# Patient Record
Sex: FEMALE | Race: WHITE | NOT HISPANIC OR LATINO
[De-identification: names, ages, dates, MRNs, and addresses within clinical notes are randomized per-mention and may not be internally consistent; named-entity substitution may affect disease eponyms.]

---

## 2018-11-27 ENCOUNTER — APPOINTMENT (OUTPATIENT)
Dept: SURGERY | Facility: CLINIC | Age: 48
End: 2018-11-27
Payer: MEDICAID

## 2018-11-27 VITALS
OXYGEN SATURATION: 97 % | HEIGHT: 63 IN | WEIGHT: 119 LBS | BODY MASS INDEX: 21.09 KG/M2 | SYSTOLIC BLOOD PRESSURE: 118 MMHG | HEART RATE: 81 BPM | TEMPERATURE: 97.9 F | DIASTOLIC BLOOD PRESSURE: 71 MMHG

## 2018-11-27 DIAGNOSIS — N63.10 UNSPECIFIED LUMP IN THE RIGHT BREAST, UNSPECIFIED QUADRANT: ICD-10-CM

## 2018-11-27 DIAGNOSIS — Z78.9 OTHER SPECIFIED HEALTH STATUS: ICD-10-CM

## 2018-11-27 DIAGNOSIS — Z80.3 FAMILY HISTORY OF MALIGNANT NEOPLASM OF BREAST: ICD-10-CM

## 2018-11-27 PROCEDURE — 99205 OFFICE O/P NEW HI 60 MIN: CPT

## 2018-12-07 PROBLEM — Z80.3 FAMILY HISTORY OF MALIGNANT NEOPLASM OF BREAST: Status: ACTIVE | Noted: 2018-11-27

## 2018-12-07 PROBLEM — N63.10 MASS OF BREAST, RIGHT: Status: ACTIVE | Noted: 2018-11-27

## 2018-12-07 PROBLEM — Z78.9 CAFFEINE USE: Status: ACTIVE | Noted: 2018-11-27

## 2018-12-11 ENCOUNTER — OUTPATIENT (OUTPATIENT)
Dept: OUTPATIENT SERVICES | Facility: HOSPITAL | Age: 48
LOS: 1 days | Discharge: HOME | End: 2018-12-11
Payer: COMMERCIAL

## 2018-12-11 VITALS
HEART RATE: 85 BPM | OXYGEN SATURATION: 100 % | WEIGHT: 121.92 LBS | TEMPERATURE: 98 F | HEIGHT: 62 IN | DIASTOLIC BLOOD PRESSURE: 60 MMHG | RESPIRATION RATE: 18 BRPM | SYSTOLIC BLOOD PRESSURE: 123 MMHG

## 2018-12-11 DIAGNOSIS — Z90.49 ACQUIRED ABSENCE OF OTHER SPECIFIED PARTS OF DIGESTIVE TRACT: Chronic | ICD-10-CM

## 2018-12-11 DIAGNOSIS — Z98.890 OTHER SPECIFIED POSTPROCEDURAL STATES: Chronic | ICD-10-CM

## 2018-12-11 DIAGNOSIS — C50.911 MALIGNANT NEOPLASM OF UNSPECIFIED SITE OF RIGHT FEMALE BREAST: ICD-10-CM

## 2018-12-11 DIAGNOSIS — C50.919 MALIGNANT NEOPLASM OF UNSPECIFIED SITE OF UNSPECIFIED FEMALE BREAST: ICD-10-CM

## 2018-12-11 DIAGNOSIS — N63.0 UNSPECIFIED LUMP IN UNSPECIFIED BREAST: ICD-10-CM

## 2018-12-11 DIAGNOSIS — Z98.82 BREAST IMPLANT STATUS: Chronic | ICD-10-CM

## 2018-12-11 DIAGNOSIS — Z29.9 ENCOUNTER FOR PROPHYLACTIC MEASURES, UNSPECIFIED: ICD-10-CM

## 2018-12-11 LAB
ANION GAP SERPL CALC-SCNC: 6 MMOL/L — SIGNIFICANT CHANGE UP (ref 5–17)
APPEARANCE UR: CLEAR — SIGNIFICANT CHANGE UP
APTT BLD: 26.6 SEC — LOW (ref 27.5–36.3)
BASOPHILS # BLD AUTO: 0.04 K/UL — SIGNIFICANT CHANGE UP (ref 0–0.2)
BASOPHILS NFR BLD AUTO: 0.9 % — SIGNIFICANT CHANGE UP (ref 0–2)
BILIRUB UR-MCNC: NEGATIVE — SIGNIFICANT CHANGE UP
BUN SERPL-MCNC: 11 MG/DL — SIGNIFICANT CHANGE UP (ref 7–23)
CALCIUM SERPL-MCNC: 8.6 MG/DL — SIGNIFICANT CHANGE UP (ref 8.5–10.1)
CHLORIDE SERPL-SCNC: 107 MMOL/L — SIGNIFICANT CHANGE UP (ref 96–108)
CO2 SERPL-SCNC: 27 MMOL/L — SIGNIFICANT CHANGE UP (ref 22–31)
COLOR SPEC: YELLOW — SIGNIFICANT CHANGE UP
CREAT SERPL-MCNC: 0.67 MG/DL — SIGNIFICANT CHANGE UP (ref 0.5–1.3)
DIFF PNL FLD: NEGATIVE — SIGNIFICANT CHANGE UP
EOSINOPHIL # BLD AUTO: 0.1 K/UL — SIGNIFICANT CHANGE UP (ref 0–0.5)
EOSINOPHIL NFR BLD AUTO: 2.3 % — SIGNIFICANT CHANGE UP (ref 0–6)
GLUCOSE SERPL-MCNC: 94 MG/DL — SIGNIFICANT CHANGE UP (ref 70–99)
GLUCOSE UR QL: NEGATIVE MG/DL — SIGNIFICANT CHANGE UP
HCT VFR BLD CALC: 41.5 % — SIGNIFICANT CHANGE UP (ref 34.5–45)
HGB BLD-MCNC: 13.9 G/DL — SIGNIFICANT CHANGE UP (ref 11.5–15.5)
IMM GRANULOCYTES NFR BLD AUTO: 0.5 % — SIGNIFICANT CHANGE UP (ref 0–1.5)
INR BLD: 0.99 RATIO — SIGNIFICANT CHANGE UP (ref 0.88–1.16)
KETONES UR-MCNC: NEGATIVE — SIGNIFICANT CHANGE UP
LEUKOCYTE ESTERASE UR-ACNC: NEGATIVE — SIGNIFICANT CHANGE UP
LYMPHOCYTES # BLD AUTO: 1.19 K/UL — SIGNIFICANT CHANGE UP (ref 1–3.3)
LYMPHOCYTES # BLD AUTO: 27.8 % — SIGNIFICANT CHANGE UP (ref 13–44)
MCHC RBC-ENTMCNC: 31.9 PG — SIGNIFICANT CHANGE UP (ref 27–34)
MCHC RBC-ENTMCNC: 33.5 GM/DL — SIGNIFICANT CHANGE UP (ref 32–36)
MCV RBC AUTO: 95.2 FL — SIGNIFICANT CHANGE UP (ref 80–100)
MONOCYTES # BLD AUTO: 0.42 K/UL — SIGNIFICANT CHANGE UP (ref 0–0.9)
MONOCYTES NFR BLD AUTO: 9.8 % — SIGNIFICANT CHANGE UP (ref 2–14)
MRSA PCR RESULT.: SIGNIFICANT CHANGE UP
NEUTROPHILS # BLD AUTO: 2.51 K/UL — SIGNIFICANT CHANGE UP (ref 1.8–7.4)
NEUTROPHILS NFR BLD AUTO: 58.7 % — SIGNIFICANT CHANGE UP (ref 43–77)
NITRITE UR-MCNC: NEGATIVE — SIGNIFICANT CHANGE UP
NRBC # BLD: 0 /100 WBCS — SIGNIFICANT CHANGE UP (ref 0–0)
PH UR: 7 — SIGNIFICANT CHANGE UP (ref 5–8)
PLATELET # BLD AUTO: 247 K/UL — SIGNIFICANT CHANGE UP (ref 150–400)
POTASSIUM SERPL-MCNC: 4.2 MMOL/L — SIGNIFICANT CHANGE UP (ref 3.5–5.3)
POTASSIUM SERPL-SCNC: 4.2 MMOL/L — SIGNIFICANT CHANGE UP (ref 3.5–5.3)
PROT UR-MCNC: NEGATIVE MG/DL — SIGNIFICANT CHANGE UP
PROTHROM AB SERPL-ACNC: 11 SEC — SIGNIFICANT CHANGE UP (ref 10–12.9)
RBC # BLD: 4.36 M/UL — SIGNIFICANT CHANGE UP (ref 3.8–5.2)
RBC # FLD: 12.7 % — SIGNIFICANT CHANGE UP (ref 10.3–14.5)
S AUREUS DNA NOSE QL NAA+PROBE: DETECTED
SODIUM SERPL-SCNC: 140 MMOL/L — SIGNIFICANT CHANGE UP (ref 135–145)
SP GR SPEC: 1.01 — SIGNIFICANT CHANGE UP (ref 1.01–1.02)
UROBILINOGEN FLD QL: NEGATIVE MG/DL — SIGNIFICANT CHANGE UP
WBC # BLD: 4.28 K/UL — SIGNIFICANT CHANGE UP (ref 3.8–10.5)
WBC # FLD AUTO: 4.28 K/UL — SIGNIFICANT CHANGE UP (ref 3.8–10.5)

## 2018-12-11 PROCEDURE — 93010 ELECTROCARDIOGRAM REPORT: CPT

## 2018-12-11 NOTE — H&P PST ADULT - ASSESSMENT
48 years old female present to PST prior to :  Bilateral nipple sparing mastectomy with right sentinel lymph node biopsy, possible axillary dissection with Dr. Vida Vidales.    Direct to implant bilateral breast reconstruction with possible tissue expanders and alloderm with Dr. Lagunas.    Plan   1. NPO after midnight  2. Urine pregnancy on the day of surgery  3. Use E-Z sponge as directed  4. Use Mupirocin as directed.  5. Drink a quart of extra  fluids the day before your surgery.  6. CBC, BMP, PT/PTT/INR, Urinalysis, MRSA sent to lab  7. EKG  done    CAPRINI SCORE [CLOT]    AGE RELATED RISK FACTORS                                                       MOBILITY RELATED FACTORS  [x ] Age 41-60 years                                            (1 Point)                  [ ] Bed rest                                                        (1 Point)  [ ] Age: 61-74 years                                           (2 Points)                 [ ] Plaster cast                                                   (2 Points)  [ ] Age= 75 years                                              (3 Points)                 [ ] Bed bound for more than 72 hours                 (2 Points)    DISEASE RELATED RISK FACTORS                                               GENDER SPECIFIC FACTORS  [ ] Edema in the lower extremities                       (1 Point)                  [ ] Pregnancy                                                     (1 Point)  [ ] Varicose veins                                               (1 Point)                  [ ] Post-partum < 6 weeks                                   (1 Point)             [ ] BMI > 25 Kg/m2                                            (1 Point)                  [ ] Hormonal therapy  or oral contraception          (1 Point)                 [ ] Sepsis (in the previous month)                        (1 Point)                  [ ] History of pregnancy complications                 (1 point)  [ ] Pneumonia or serious lung disease                                               [ ] Unexplained or recurrent                     (1 Point)           (in the previous month)                               (1 Point)  [ ] Abnormal pulmonary function test                     (1 Point)                 SURGERY RELATED RISK FACTORS  [ ] Acute myocardial infarction                              (1 Point)                 [ ]  Section                                             (1 Point)  [ ] Congestive heart failure (in the previous month)  (1 Point)               [ ] Minor surgery                                                  (1 Point)   [ ] Inflammatory bowel disease                             (1 Point)                 [ ] Arthroscopic surgery                                        (2 Points)  [ ] Central venous access                                      (2 Points)                [x ] General surgery lasting more than 45 minutes   (2 Points)       [ ] Stroke (in the previous month)                          (5 Points)               [ ] Elective arthroplasty                                         (5 Points)            [ x] malignancy present or previous                      (2 points)                                                                                                                                   HEMATOLOGY RELATED FACTORS                                                 TRAUMA RELATED RISK FACTORS  [ ] Prior episodes of VTE                                     (3 Points)                 [ ] Fracture of the hip, pelvis, or leg                       (5 Points)  [ ] Positive family history for VTE                         (3 Points)                 [ ] Acute spinal cord injury (in the previous month)  (5 Points)  [ ] Prothrombin 45795 A                                     (3 Points)                 [ ] Paralysis  (less than 1 month)                             (5 Points)  [ ] Factor V Leiden                                             (3 Points)                  [ ] Multiple Trauma within 1 month                        (5 Points)  [ ] Lupus anticoagulants                                     (3 Points)                                                           [ ] Anticardiolipin antibodies                               (3 Points)                                                       [ ] High homocysteine in the blood                      (3 Points)                                             [ ] Other congenital or acquired thrombophilia      (3 Points)                                                [ ] Heparin induced thrombocytopenia                  (3 Points)                                          Total Score [    5      ]

## 2018-12-11 NOTE — H&P PST ADULT - NEGATIVE SKIN SYMPTOMS
no itching/no brittle nails/no dryness/no change in size/color of mole/no tumor/no pitted nails/no hair loss/no rash

## 2018-12-11 NOTE — H&P PST ADULT - PMH
BRCA positive    Breast cancer in female  Right  Gastric polyp  History of  History of gallbladder disease  removed  Hyperlipidemia, unspecified hyperlipidemia type

## 2018-12-11 NOTE — H&P PST ADULT - TEACHING/LEARNING LEARNING PREFERENCES
verbal instruction/pictorial/group instruction/individual instruction/audio/skill demonstration/computer/internet/video/written material

## 2018-12-11 NOTE — H&P PST ADULT - PSH
H/O breast augmentation  2004  History of cholecystectomy  1989  History of other surgery  Liposuction

## 2018-12-11 NOTE — H&P PST ADULT - FAMILY HISTORY
Grandparent  Still living? No  Family history of breast cancer, Age at diagnosis: Age Unknown     Aunt  Still living? Yes, Estimated age: Age Unknown  Family history of breast cancer, Age at diagnosis: Age Unknown     Grandparent  Still living? No  Family history of throat cancer, Age at diagnosis: Age Unknown     Father  Still living? No  Family history of acute lymphoid leukemia, Age at diagnosis: Age Unknown

## 2018-12-11 NOTE — H&P PST ADULT - HISTORY OF PRESENT ILLNESS
48 years old female with Right breast cancer. She is also BRCA positive. She  tested positive in September, 2018. Cousins x 2 and sister also tested positive after reoccurring breast cancer in aunt. She then had several tests including Mammogram, sonogram, MRI with contrast. She also had breast biopsy with marker. Planned mastectomy with reconstruction.

## 2018-12-11 NOTE — H&P PST ADULT - ATTENDING COMMENTS
Patient understands technical aspects of procedure, risks particular and specific to nipple sparing mastectomy, the need for sentinel lymph node biopsy on the cancer side (right side) with possible axillary dissection depending on intra operative pathology analysis (frozen section).  She also understands risks v benefits and alternative to surgery.

## 2018-12-12 ENCOUNTER — OUTPATIENT (OUTPATIENT)
Dept: OUTPATIENT SERVICES | Facility: HOSPITAL | Age: 48
LOS: 1 days | Discharge: HOME | End: 2018-12-12
Payer: COMMERCIAL

## 2018-12-12 ENCOUNTER — RESULT REVIEW (OUTPATIENT)
Age: 48
End: 2018-12-12

## 2018-12-12 DIAGNOSIS — Z98.82 BREAST IMPLANT STATUS: Chronic | ICD-10-CM

## 2018-12-12 DIAGNOSIS — Z98.890 OTHER SPECIFIED POSTPROCEDURAL STATES: Chronic | ICD-10-CM

## 2018-12-12 DIAGNOSIS — C50.919 MALIGNANT NEOPLASM OF UNSPECIFIED SITE OF UNSPECIFIED FEMALE BREAST: ICD-10-CM

## 2018-12-12 DIAGNOSIS — Z90.49 ACQUIRED ABSENCE OF OTHER SPECIFIED PARTS OF DIGESTIVE TRACT: Chronic | ICD-10-CM

## 2018-12-12 LAB — SURGICAL PATHOLOGY FINAL REPORT - CH: SIGNIFICANT CHANGE UP

## 2018-12-12 PROCEDURE — 88321 CONSLTJ&REPRT SLD PREP ELSWR: CPT

## 2018-12-14 PROBLEM — Z15.01 GENETIC SUSCEPTIBILITY TO MALIGNANT NEOPLASM OF BREAST: Chronic | Status: ACTIVE | Noted: 2018-12-11

## 2018-12-14 PROBLEM — K31.7 POLYP OF STOMACH AND DUODENUM: Chronic | Status: ACTIVE | Noted: 2018-12-11

## 2018-12-19 ENCOUNTER — RESULT REVIEW (OUTPATIENT)
Age: 48
End: 2018-12-19

## 2018-12-19 ENCOUNTER — INPATIENT (INPATIENT)
Facility: HOSPITAL | Age: 48
LOS: 0 days | Discharge: HOME | End: 2018-12-20
Attending: SURGERY | Admitting: SURGERY
Payer: MEDICAID

## 2018-12-19 ENCOUNTER — APPOINTMENT (OUTPATIENT)
Dept: SURGERY | Facility: HOSPITAL | Age: 48
End: 2018-12-19

## 2018-12-19 VITALS
TEMPERATURE: 98 F | OXYGEN SATURATION: 100 % | DIASTOLIC BLOOD PRESSURE: 90 MMHG | HEART RATE: 100 BPM | WEIGHT: 122.36 LBS | SYSTOLIC BLOOD PRESSURE: 121 MMHG | HEIGHT: 63 IN | RESPIRATION RATE: 16 BRPM

## 2018-12-19 DIAGNOSIS — Z90.49 ACQUIRED ABSENCE OF OTHER SPECIFIED PARTS OF DIGESTIVE TRACT: Chronic | ICD-10-CM

## 2018-12-19 DIAGNOSIS — Z98.890 OTHER SPECIFIED POSTPROCEDURAL STATES: Chronic | ICD-10-CM

## 2018-12-19 DIAGNOSIS — Z98.82 BREAST IMPLANT STATUS: Chronic | ICD-10-CM

## 2018-12-19 LAB — HCG UR QL: NEGATIVE — SIGNIFICANT CHANGE UP

## 2018-12-19 PROCEDURE — 88300 SURGICAL PATH GROSS: CPT | Mod: 26,59

## 2018-12-19 PROCEDURE — 38792 RA TRACER ID OF SENTINL NODE: CPT | Mod: RT

## 2018-12-19 PROCEDURE — 88307 TISSUE EXAM BY PATHOLOGIST: CPT | Mod: 26

## 2018-12-19 PROCEDURE — 88305 TISSUE EXAM BY PATHOLOGIST: CPT | Mod: 26

## 2018-12-19 PROCEDURE — 19357 TISS XPNDR PLMT BRST RCNSTJ: CPT | Mod: AS,50

## 2018-12-19 PROCEDURE — 88333 PATH CONSLTJ SURG CYTO XM 1: CPT | Mod: 26

## 2018-12-19 PROCEDURE — 38525 BIOPSY/REMOVAL LYMPH NODES: CPT | Mod: RT

## 2018-12-19 PROCEDURE — 19303 MAST SIMPLE COMPLETE: CPT | Mod: 50

## 2018-12-19 RX ORDER — SODIUM CHLORIDE 9 MG/ML
1000 INJECTION INTRAMUSCULAR; INTRAVENOUS; SUBCUTANEOUS
Qty: 0 | Refills: 0 | Status: DISCONTINUED | OUTPATIENT
Start: 2018-12-19 | End: 2018-12-20

## 2018-12-19 RX ORDER — ONDANSETRON 8 MG/1
4 TABLET, FILM COATED ORAL ONCE
Qty: 0 | Refills: 0 | Status: COMPLETED | OUTPATIENT
Start: 2018-12-19 | End: 2018-12-19

## 2018-12-19 RX ORDER — FENTANYL CITRATE 50 UG/ML
50 INJECTION INTRAVENOUS
Qty: 0 | Refills: 0 | Status: DISCONTINUED | OUTPATIENT
Start: 2018-12-19 | End: 2018-12-19

## 2018-12-19 RX ORDER — CEFAZOLIN SODIUM 1 G
1000 VIAL (EA) INJECTION EVERY 8 HOURS
Qty: 0 | Refills: 0 | Status: DISCONTINUED | OUTPATIENT
Start: 2018-12-19 | End: 2018-12-19

## 2018-12-19 RX ORDER — PROCHLORPERAZINE MALEATE 5 MG
10 TABLET ORAL ONCE
Qty: 0 | Refills: 0 | Status: COMPLETED | OUTPATIENT
Start: 2018-12-19 | End: 2018-12-19

## 2018-12-19 RX ORDER — ACETAMINOPHEN 500 MG
1000 TABLET ORAL ONCE
Qty: 0 | Refills: 0 | Status: COMPLETED | OUTPATIENT
Start: 2018-12-19 | End: 2018-12-19

## 2018-12-19 RX ORDER — MORPHINE SULFATE 50 MG/1
2 CAPSULE, EXTENDED RELEASE ORAL EVERY 4 HOURS
Qty: 0 | Refills: 0 | Status: DISCONTINUED | OUTPATIENT
Start: 2018-12-19 | End: 2018-12-20

## 2018-12-19 RX ORDER — CYCLOBENZAPRINE HYDROCHLORIDE 10 MG/1
10 TABLET, FILM COATED ORAL THREE TIMES A DAY
Qty: 0 | Refills: 0 | Status: DISCONTINUED | OUTPATIENT
Start: 2018-12-19 | End: 2018-12-20

## 2018-12-19 RX ORDER — SODIUM CHLORIDE 9 MG/ML
1000 INJECTION INTRAMUSCULAR; INTRAVENOUS; SUBCUTANEOUS
Qty: 0 | Refills: 0 | Status: DISCONTINUED | OUTPATIENT
Start: 2018-12-19 | End: 2018-12-19

## 2018-12-19 RX ORDER — OXYCODONE HYDROCHLORIDE 5 MG/1
10 TABLET ORAL ONCE
Qty: 0 | Refills: 0 | Status: DISCONTINUED | OUTPATIENT
Start: 2018-12-19 | End: 2018-12-19

## 2018-12-19 RX ORDER — OXYCODONE HYDROCHLORIDE 5 MG/1
5 TABLET ORAL EVERY 4 HOURS
Qty: 0 | Refills: 0 | Status: DISCONTINUED | OUTPATIENT
Start: 2018-12-19 | End: 2018-12-20

## 2018-12-19 RX ORDER — ONDANSETRON 8 MG/1
4 TABLET, FILM COATED ORAL EVERY 6 HOURS
Qty: 0 | Refills: 0 | Status: DISCONTINUED | OUTPATIENT
Start: 2018-12-19 | End: 2018-12-20

## 2018-12-19 RX ORDER — ACETAMINOPHEN 500 MG
975 TABLET ORAL ONCE
Qty: 0 | Refills: 0 | Status: COMPLETED | OUTPATIENT
Start: 2018-12-19 | End: 2018-12-19

## 2018-12-19 RX ORDER — OXYCODONE HYDROCHLORIDE 5 MG/1
5 TABLET ORAL ONCE
Qty: 0 | Refills: 0 | Status: DISCONTINUED | OUTPATIENT
Start: 2018-12-19 | End: 2018-12-19

## 2018-12-19 RX ORDER — HEPARIN SODIUM 5000 [USP'U]/ML
5000 INJECTION INTRAVENOUS; SUBCUTANEOUS EVERY 8 HOURS
Qty: 0 | Refills: 0 | Status: DISCONTINUED | OUTPATIENT
Start: 2018-12-19 | End: 2018-12-20

## 2018-12-19 RX ORDER — CEFAZOLIN SODIUM 1 G
1000 VIAL (EA) INJECTION EVERY 8 HOURS
Qty: 0 | Refills: 0 | Status: DISCONTINUED | OUTPATIENT
Start: 2018-12-19 | End: 2018-12-20

## 2018-12-19 RX ORDER — OXYCODONE HYDROCHLORIDE 5 MG/1
10 TABLET ORAL EVERY 4 HOURS
Qty: 0 | Refills: 0 | Status: DISCONTINUED | OUTPATIENT
Start: 2018-12-19 | End: 2018-12-20

## 2018-12-19 RX ADMIN — HEPARIN SODIUM 5000 UNIT(S): 5000 INJECTION INTRAVENOUS; SUBCUTANEOUS at 21:44

## 2018-12-19 RX ADMIN — Medication 975 MILLIGRAM(S): at 08:00

## 2018-12-19 RX ADMIN — OXYCODONE HYDROCHLORIDE 10 MILLIGRAM(S): 5 TABLET ORAL at 08:01

## 2018-12-19 RX ADMIN — ONDANSETRON 4 MILLIGRAM(S): 8 TABLET, FILM COATED ORAL at 14:50

## 2018-12-19 RX ADMIN — Medication 1000 MILLIGRAM(S): at 16:51

## 2018-12-19 RX ADMIN — Medication 1000 MILLIGRAM(S): at 22:01

## 2018-12-19 RX ADMIN — ONDANSETRON 4 MILLIGRAM(S): 8 TABLET, FILM COATED ORAL at 22:11

## 2018-12-19 RX ADMIN — Medication 10 MILLIGRAM(S): at 15:04

## 2018-12-19 RX ADMIN — Medication 400 MILLIGRAM(S): at 14:58

## 2018-12-19 RX ADMIN — OXYCODONE HYDROCHLORIDE 5 MILLIGRAM(S): 5 TABLET ORAL at 16:15

## 2018-12-19 RX ADMIN — Medication 975 MILLIGRAM(S): at 08:01

## 2018-12-19 RX ADMIN — Medication 400 MILLIGRAM(S): at 21:46

## 2018-12-19 RX ADMIN — ONDANSETRON 4 MILLIGRAM(S): 8 TABLET, FILM COATED ORAL at 18:50

## 2018-12-19 RX ADMIN — Medication 1000 MILLIGRAM(S): at 21:39

## 2018-12-19 RX ADMIN — OXYCODONE HYDROCHLORIDE 10 MILLIGRAM(S): 5 TABLET ORAL at 08:00

## 2018-12-19 NOTE — BRIEF OPERATIVE NOTE - PROCEDURE
<<-----Click on this checkbox to enter Procedure Breast reconstruction, bilateral  12/19/2018    Active  [id number]

## 2018-12-19 NOTE — PATIENT PROFILE ADULT - HAVE YOU EXPERIENCED VIOLENCE OR A TRAUMATIC EVENT?
L leg w/ no swelling or tenderness, normal calf squeeze and thigh squeeze, no bony tenderness. Full ROM, no skin changes comparing the 2 legs. no

## 2018-12-20 VITALS
HEART RATE: 94 BPM | SYSTOLIC BLOOD PRESSURE: 132 MMHG | OXYGEN SATURATION: 100 % | RESPIRATION RATE: 16 BRPM | DIASTOLIC BLOOD PRESSURE: 56 MMHG | TEMPERATURE: 98 F

## 2018-12-20 RX ORDER — KETOROLAC TROMETHAMINE 30 MG/ML
15 SYRINGE (ML) INJECTION ONCE
Qty: 0 | Refills: 0 | Status: DISCONTINUED | OUTPATIENT
Start: 2018-12-20 | End: 2018-12-20

## 2018-12-20 RX ORDER — OXYCODONE HYDROCHLORIDE 5 MG/1
1 TABLET ORAL
Qty: 0 | Refills: 0 | DISCHARGE
Start: 2018-12-20

## 2018-12-20 RX ADMIN — CYCLOBENZAPRINE HYDROCHLORIDE 10 MILLIGRAM(S): 10 TABLET, FILM COATED ORAL at 05:03

## 2018-12-20 RX ADMIN — SODIUM CHLORIDE 75 MILLILITER(S): 9 INJECTION INTRAMUSCULAR; INTRAVENOUS; SUBCUTANEOUS at 05:02

## 2018-12-20 RX ADMIN — HEPARIN SODIUM 5000 UNIT(S): 5000 INJECTION INTRAVENOUS; SUBCUTANEOUS at 14:39

## 2018-12-20 RX ADMIN — Medication 1000 MILLIGRAM(S): at 05:04

## 2018-12-20 RX ADMIN — HEPARIN SODIUM 5000 UNIT(S): 5000 INJECTION INTRAVENOUS; SUBCUTANEOUS at 05:11

## 2018-12-20 RX ADMIN — OXYCODONE HYDROCHLORIDE 5 MILLIGRAM(S): 5 TABLET ORAL at 09:20

## 2018-12-20 RX ADMIN — Medication 1000 MILLIGRAM(S): at 14:39

## 2018-12-20 RX ADMIN — OXYCODONE HYDROCHLORIDE 5 MILLIGRAM(S): 5 TABLET ORAL at 10:44

## 2018-12-20 NOTE — PROGRESS NOTE ADULT - ASSESSMENT
POD#1 s/p bilateral mastetcomy, right ALNbx, breast reconstruction with direct implants and alloderm, doing well.  -oral pain medication to be given now; if tolerates well, and pain well managed, okay to discharge home today.  -drain teaching  -she will follow up with me in the office in 1 week.

## 2018-12-20 NOTE — PROVIDER CONTACT NOTE (OTHER) - ASSESSMENT
Pt. would like baldwin out, PA previously notified about situation
patient having some nausea, also asking if baldwin can be removed early, RN (myself) noticed there was no baldwin order listed, made PA aware.

## 2018-12-20 NOTE — PROVIDER CONTACT NOTE (OTHER) - RECOMMENDATIONS
ok to give subq heparin as scheduled as per blas STUART
ok now to remove nicolasa as per permission via blas ramirez
As per PA monitor maintain baldwin for now so no complications arise and re-evalulate again in AM, ok to place baldwin order for PA, (await further order to remove) ok to give zofran 4mg stat IV.

## 2018-12-20 NOTE — PROVIDER CONTACT NOTE (OTHER) - BACKGROUND
Pt. had b/l mastectomy today, labs drawn on 12/11 plt count 247
PT. had b/l mastectomy, pt. stable and no complications.

## 2018-12-20 NOTE — PROGRESS NOTE ADULT - SUBJECTIVE AND OBJECTIVE BOX
Feels well, ambulating, voiding, but c/o surgical site pain 5/10- did not want to take pain medication.   She only had one dose of flexeril last night.    Afebrile, normotensive, 100% RA,   Drains: 70, 50, 30, 55cc ss  Bilateral breasts soft, symmetric.  Mastectomy skin flaps pink and viable, NACs pink and viable.

## 2018-12-20 NOTE — PROVIDER CONTACT NOTE (OTHER) - REASON
Pt. curious if baldwin can be removed early, also feeling nauseous
permission to pull out nicolasa
Patient due subq heparin

## 2018-12-24 LAB — SURGICAL PATHOLOGY FINAL REPORT - CH: SIGNIFICANT CHANGE UP

## 2018-12-31 DIAGNOSIS — C50.811 MALIGNANT NEOPLASM OF OVERLAPPING SITES OF RIGHT FEMALE BREAST: ICD-10-CM

## 2018-12-31 DIAGNOSIS — Z90.49 ACQUIRED ABSENCE OF OTHER SPECIFIED PARTS OF DIGESTIVE TRACT: ICD-10-CM

## 2019-01-07 ENCOUNTER — APPOINTMENT (OUTPATIENT)
Dept: SURGERY | Facility: CLINIC | Age: 49
End: 2019-01-07
Payer: MEDICAID

## 2019-01-07 VITALS
BODY MASS INDEX: 21.97 KG/M2 | DIASTOLIC BLOOD PRESSURE: 77 MMHG | WEIGHT: 124 LBS | HEART RATE: 96 BPM | SYSTOLIC BLOOD PRESSURE: 118 MMHG | TEMPERATURE: 98.3 F

## 2019-01-07 PROCEDURE — 99024 POSTOP FOLLOW-UP VISIT: CPT

## 2019-01-07 NOTE — HISTORY OF PRESENT ILLNESS
[FreeTextEntry1] : I had the pleasure of seeing PAVEL CANTU  in the office today for a postoperative visit s/p bilateral skin and nipple sparring mastectomy with right SLNB for right breast IDC grade 3 ER 90% IA 50% Her2 negative 12/19/2018.\par \par Pavel is the niece of Marlene Santos (BRCA1 positive).  She underwent screening imaging which came back Birads 1 negative.  She then underwent MRI 10/26/2018 which demonstrated a right upper inner quadrant irregular enhancing nodule and a second look ultrasound with US biopsy was advised.  She underwent biopsy demonstrating right IDC grade 2 ER 90% IA 50% Her2 negative.\par \par She denies dominant breast mass, skin changes or nipple discharge. \par \par Imaging:  [facility]\par 10/3/2018  Mammo screening ros implant BILAT\par Impression:  No radiographic evidence of significant breast pathology or developing malignancy.  In the absence of any new clinically worrisome findings and if the patient meets screening criteria, followup annual mammography performed with tomosynthesis would be advised.   BIRADS 1 negative\par \par 10/26/2018  MRI Breast PRE and POST IV contrast\par Impression:  Right breast upper inner quadrant irregular enhancing mass.  No correlate seen on screening mammogram and breast ultrasound performed 10/3/2018.  A targeted second look right breast ultrasound with ultrasound guided core biopsy of suspicious lesion is advised.  Recommendation:  Additional Imaging Evaluation.  BIRADS 0  Incomplete:  Needs additional imaging evaluation\par \par 11/8/2018  Pathology:  Infiltrating ductal carcinoma [place] score 7/9 ER 90% IA 50% HER2 negative\par 12/19/2018  Final surgical pathology:  See EMR\par \par We reviewed and discussed clinical breast exam and final surgical pathology.  She understands the invasive tumor measured 1.7 cm.  0/3 lymph nodes were tested and negative for carcinoma.  We reviewed her Oncotype score of 20.  She is still complaining of a lot right axilla pain and Percocet and Valium will be sent in.  Recommendation for consult with Dr. Goodwin in 4 weeks when she followup with me to discuss hormonal therapy.  At next appointment we will discuss setting her up for BSO.  She understands and agrees to plan.

## 2019-01-07 NOTE — PHYSICAL EXAM
[Normocephalic] : normocephalic [Atraumatic] : atraumatic [EOMI] : extra ocular movement intact [PERRL] : pupils equal, round and reactive to light [Sclera nonicteric] : sclera nonicteric [Supple] : supple [No Supraclavicular Adenopathy] : no supraclavicular adenopathy [Examined in the supine and seated position] : examined in the supine and seated position [No dominant masses] : no dominant masses in right breast  [No dominant masses] : no dominant masses left breast [No Nipple Retraction] : no left nipple retraction [No Nipple Discharge] : no left nipple discharge [Breast Nipple Inversion] : nipples not inverted [Breast Nipple Retraction] : nipples not retracted [Breast Nipple Flattening] : nipples not flattened [Breast Nipple Fissures] : nipples not fissured [Breast Abnormal Lactation (Galactorrhea)] : no galactorrhea [Breast Abnormal Secretion Bloody Fluid] : no bloody discharge [Breast Abnormal Secretion Serous Fluid] : no serous discharge [Breast Abnormal Secretion Opalescent Fluid] : no milky discharge [No Axillary Lymphadenopathy] : no left axillary lymphadenopathy [No Edema] : no edema [No Rashes] : no rashes [No Ulceration] : no ulceration [de-identified] : Mastectomy flap healing well.\par  [de-identified] : Mastectomy flap healing well.\par

## 2019-01-07 NOTE — REASON FOR VISIT
[Post Op: _________] : a [unfilled] post op visit [Spouse] : spouse [FreeTextEntry1] : bilateral nipple and ski sparring mastectomy with right SLNB

## 2019-01-07 NOTE — ASSESSMENT
[FreeTextEntry1] : 47 yo female presents with newly diagnosed right breast infiltrating carcinoma Histologic grade 7/9 ER 90% IN 50 % her2 negative.  She was BRCA 1 genetic mutation.   She underwent bilateral nipple sparring mastectomy with right SLNB and direct saline implants 12/19/2018.  The margins were clear and 0/3 lymph nodes negative for carcinoma.  Oncotype score was 20.  Recommendation for consult with medical oncology to discuss hormonal therapy and followup in 4 weeks for CBE.  We will discuss setting her up to see gyn onc for BSO at that time.  \par 1.  Follow up in 4 weeks \par 2. Consult with medical oncology\par 3.  Valium and Percocet sent to pharmacy for pain

## 2019-01-22 ENCOUNTER — OUTPATIENT (OUTPATIENT)
Dept: OUTPATIENT SERVICES | Facility: HOSPITAL | Age: 49
LOS: 1 days | Discharge: HOME | End: 2019-01-22
Payer: MEDICAID

## 2019-01-22 ENCOUNTER — RESULT REVIEW (OUTPATIENT)
Age: 49
End: 2019-01-22

## 2019-01-22 DIAGNOSIS — C50.919 MALIGNANT NEOPLASM OF UNSPECIFIED SITE OF UNSPECIFIED FEMALE BREAST: ICD-10-CM

## 2019-01-22 DIAGNOSIS — Z90.49 ACQUIRED ABSENCE OF OTHER SPECIFIED PARTS OF DIGESTIVE TRACT: Chronic | ICD-10-CM

## 2019-01-22 DIAGNOSIS — Z98.890 OTHER SPECIFIED POSTPROCEDURAL STATES: Chronic | ICD-10-CM

## 2019-01-22 DIAGNOSIS — Z98.82 BREAST IMPLANT STATUS: Chronic | ICD-10-CM

## 2019-01-25 ENCOUNTER — RESULT REVIEW (OUTPATIENT)
Age: 49
End: 2019-01-25

## 2019-01-25 PROCEDURE — 88321 CONSLTJ&REPRT SLD PREP ELSWR: CPT

## 2019-01-28 LAB — SURGICAL PATHOLOGY STUDY: SIGNIFICANT CHANGE UP

## 2019-02-05 ENCOUNTER — RESULT REVIEW (OUTPATIENT)
Age: 49
End: 2019-02-05

## 2019-02-05 ENCOUNTER — APPOINTMENT (OUTPATIENT)
Dept: HEMATOLOGY ONCOLOGY | Facility: CLINIC | Age: 49
End: 2019-02-05
Payer: MEDICAID

## 2019-02-05 VITALS
DIASTOLIC BLOOD PRESSURE: 84 MMHG | TEMPERATURE: 98.4 F | WEIGHT: 122 LBS | HEART RATE: 81 BPM | OXYGEN SATURATION: 100 % | HEIGHT: 63 IN | SYSTOLIC BLOOD PRESSURE: 129 MMHG | BODY MASS INDEX: 21.62 KG/M2

## 2019-02-05 LAB
BASOPHILS # BLD AUTO: 0.1 K/UL — SIGNIFICANT CHANGE UP (ref 0–0.2)
BASOPHILS NFR BLD AUTO: 2.6 % — HIGH (ref 0–2)
EOSINOPHIL # BLD AUTO: 0.1 K/UL — SIGNIFICANT CHANGE UP (ref 0–0.5)
EOSINOPHIL NFR BLD AUTO: 4 % — SIGNIFICANT CHANGE UP (ref 0–6)
HCT VFR BLD CALC: 41.4 % — SIGNIFICANT CHANGE UP (ref 34.5–45)
HGB BLD-MCNC: 13.7 G/DL — SIGNIFICANT CHANGE UP (ref 11.5–15.5)
LYMPHOCYTES # BLD AUTO: 1.2 K/UL — SIGNIFICANT CHANGE UP (ref 1–3.3)
LYMPHOCYTES # BLD AUTO: 38 % — SIGNIFICANT CHANGE UP (ref 13–44)
MCHC RBC-ENTMCNC: 31.2 PG — SIGNIFICANT CHANGE UP (ref 27–34)
MCHC RBC-ENTMCNC: 33.1 G/DL — SIGNIFICANT CHANGE UP (ref 32–36)
MCV RBC AUTO: 94.5 FL — SIGNIFICANT CHANGE UP (ref 80–100)
MONOCYTES # BLD AUTO: 0.3 K/UL — SIGNIFICANT CHANGE UP (ref 0–0.9)
MONOCYTES NFR BLD AUTO: 9.7 % — SIGNIFICANT CHANGE UP (ref 2–14)
NEUTROPHILS # BLD AUTO: 1.5 K/UL — LOW (ref 1.8–7.4)
NEUTROPHILS NFR BLD AUTO: 45.6 % — SIGNIFICANT CHANGE UP (ref 43–77)
PLATELET # BLD AUTO: 240 K/UL — SIGNIFICANT CHANGE UP (ref 150–400)
RBC # BLD: 4.38 M/UL — SIGNIFICANT CHANGE UP (ref 3.8–5.2)
RBC # FLD: 11.9 % — SIGNIFICANT CHANGE UP (ref 10.3–14.5)
WBC # BLD: 3.2 K/UL — LOW (ref 3.8–10.5)
WBC # FLD AUTO: 3.2 K/UL — LOW (ref 3.8–10.5)

## 2019-02-05 PROCEDURE — 99205 OFFICE O/P NEW HI 60 MIN: CPT

## 2019-02-05 RX ORDER — ALPRAZOLAM 0.5 MG/1
0.5 TABLET ORAL TWICE DAILY
Qty: 30 | Refills: 0 | Status: DISCONTINUED | OUTPATIENT
Start: 2018-12-17 | End: 2019-02-05

## 2019-02-05 RX ORDER — OXYCODONE AND ACETAMINOPHEN 5; 325 MG/1; MG/1
5-325 TABLET ORAL EVERY 6 HOURS
Qty: 40 | Refills: 0 | Status: DISCONTINUED | COMMUNITY
Start: 2019-01-07 | End: 2019-02-05

## 2019-02-05 RX ORDER — DIAZEPAM 5 MG/1
5 TABLET ORAL TWICE DAILY
Qty: 30 | Refills: 0 | Status: DISCONTINUED | COMMUNITY
Start: 2019-01-07 | End: 2019-02-05

## 2019-02-05 RX ORDER — SULFAMETHOXAZOLE AND TRIMETHOPRIM 400; 80 MG/1; MG/1
400-80 TABLET ORAL
Refills: 0 | Status: DISCONTINUED | COMMUNITY

## 2019-02-05 RX ORDER — ALPRAZOLAM 0.5 MG/1
0.5 TABLET ORAL TWICE DAILY
Qty: 30 | Refills: 0 | Status: DISCONTINUED | COMMUNITY
Start: 2018-12-17 | End: 2019-02-05

## 2019-02-05 NOTE — ASSESSMENT
[FreeTextEntry1] : In summary, this is a 48 year old female, BRCA1 positive, with stage IA poorly differentiated IDC of right breast (T1cN0,  ER 90% AK 50%, HER-2 negative) s/p bilateral mastectomy on 12/19/18.  Oncotype Dx 20 which is intermediate risk. \par \par Today we discussed the available radiographic and pathologic data in detail.  We also discussed the natural history of the disease, as well as management options. Based on [employer] / Oncotype Dx 20, her absolute benefit from chemotherapy is 1.6% and the risks likely outweigh the benefits.  We instead discussed Tamoxifen with which her risk of distance recurrence at 9 years is 6%.   Side effects of tamoxifen can include but are not limited to hot flashes, mood changes, fluid retention, vaginal dryness, cataracts, thromboembolic events, stroke,  risk of endometrial cancer. The risk of serious side effects including endometrial cancer and thromboembolic events is much lower for females less than 50 years of age.  She will followup with her gynecologist every 6 months and report any unusual vaginal bleeding or spotting. \par \par Given BRCA1 positively we also discussed role of risk reducing BSO. \par \par She was quite overwhelmed and tearful with above discussion.  We agreed that she will think over our discussion today and let me know her decision about Tamoxifen.  \par \par \par Plan: \par -Follow up in 6 weeks\par -Referral to genetic counselor\par -will need referral to gyn onc for rrBSO\par -She will call me sooner if she decides to proceed with Tamoxifen\par

## 2019-02-05 NOTE — HISTORY OF PRESENT ILLNESS
[Disease: _____________________] : Disease: [unfilled] [T: ___] : T[unfilled] [N: ___] : N[unfilled] [M: ___] : M[unfilled] [AJCC Stage: ____] : AJCC Stage: [unfilled] [de-identified] : Ms. Hardin was diagnosed with right breast IDC at the age of 48 in December 2018. Given positive family history, she tested positive for a BRCA1 mutation in 9/2018.\par The patient underwent screening imaging on 10/1/18 which only showed minimal bilateral fibrocystic nodularity but was otherwise a negative bilateral breast US. She then underwent an MRI on 10/26/18 which demonstrated a right upper inner quadrant irregular enhancing nodule and a second US with US biopsy was advised. \par \par An US guided biopsy was performed on 11/8/18 and pathology revealed infiltrative ductal carcinoma with no definitive lymphovascular invasion and no in situ component identified. Boone score 7/9, ER 90% MS 50%, HER-2 negative. \par \par She is s/p bilateral mastectomy and sentinel lymph node biopsy on 12/19/18 with Dr. Vidales. \par Pathology:\par Right breast - poorly differentiated infiltrative ductal carcinoma,  [place] score of 8/9, 1.7 cm.   There was no definitive lymphovascular invasion or in situ component identified. The tumor showed brisk lymphocytic response and margins were negative. The skeletal muscle was negative for malignancy. The 3 lymph nodes were negative for tumor.  pT1c N0\par Left breast - negative for malignancy but had focal usual ductal hyperplasia and nodular dense fibrosis. \par \par Her Oncotype score is 20 and she is BRCA1 positive [id number] pathogenic mutation.\par \par She is otherwise healthy and denies any other medical problems. Menstrual period has been irregular, with LMP in December 2018. She continues to have discomfort and stiffness post-op.  She is usually very physically active in the gym and doing yoga, but is having trouble being less active. \par \par FMHx: breast cancer in paternal aunt and grandmother\par Social Hx: 8 year smoking history but quit 22 years ago, occasional drinker, 2 children and 1 stepchild (all girls, ages 17-22),  [de-identified] : poorly differentiated IDC [de-identified] :  ER 90% MN 50%, HER-2 negative

## 2019-02-05 NOTE — CONSULT LETTER
[Dear  ___] : Dear  [unfilled], [Consult Letter:] : I had the pleasure of evaluating your patient, [unfilled]. [Please see my note below.] : Please see my note below. [Consult Closing:] : Thank you very much for allowing me to participate in the care of this patient.  If you have any questions, please do not hesitate to contact me. [Sincerely,] : Sincerely, [FreeTextEntry3] : Damir Goodwin MD\par Medical Oncology/Hematology\par [facility], [facility]\par \par \par [facility]\par

## 2019-03-04 ENCOUNTER — APPOINTMENT (OUTPATIENT)
Dept: SURGERY | Facility: CLINIC | Age: 49
End: 2019-03-04
Payer: MEDICAID

## 2019-03-04 VITALS
DIASTOLIC BLOOD PRESSURE: 80 MMHG | TEMPERATURE: 98.4 F | SYSTOLIC BLOOD PRESSURE: 118 MMHG | WEIGHT: 120.31 LBS | BODY MASS INDEX: 21.32 KG/M2 | HEIGHT: 63 IN

## 2019-03-04 PROCEDURE — 99024 POSTOP FOLLOW-UP VISIT: CPT

## 2019-03-04 NOTE — PHYSICAL EXAM
[Normocephalic] : normocephalic [Atraumatic] : atraumatic [EOMI] : extra ocular movement intact [PERRL] : pupils equal, round and reactive to light [Sclera nonicteric] : sclera nonicteric [Supple] : supple [No Supraclavicular Adenopathy] : no supraclavicular adenopathy [Examined in the supine and seated position] : examined in the supine and seated position [No dominant masses] : no dominant masses in right breast  [No dominant masses] : no dominant masses left breast [No Nipple Retraction] : no left nipple retraction [No Nipple Discharge] : no left nipple discharge [Breast Nipple Inversion] : nipples not inverted [Breast Nipple Retraction] : nipples not retracted [Breast Nipple Flattening] : nipples not flattened [Breast Nipple Fissures] : nipples not fissured [Breast Abnormal Lactation (Galactorrhea)] : no galactorrhea [Breast Abnormal Secretion Bloody Fluid] : no bloody discharge [Breast Abnormal Secretion Serous Fluid] : no serous discharge [Breast Abnormal Secretion Opalescent Fluid] : no milky discharge [No Axillary Lymphadenopathy] : no left axillary lymphadenopathy [No Edema] : no edema [No Rashes] : no rashes [No Ulceration] : no ulceration [de-identified] : Mastectomy flap healing well.\par  [de-identified] : Mastectomy flap healing well.\par

## 2019-03-04 NOTE — HISTORY OF PRESENT ILLNESS
[FreeTextEntry1] : I had the pleasure of seeing PAVEL CANTU  in the office today for a followup visit. She is s/p bilateral skin and nipple sparring mastectomy with right SLNB for right breast (T1c, N0,Mx) IDC grade 3 ER 90% CO 50% Her2 negative 12/19/2018.\par \par Pavel is the niece of Marlene Santos (BRCA1 positive).  She underwent screening imaging which came back Birads 1 negative.  She then underwent MRI 10/26/2018 which demonstrated a right upper inner quadrant irregular enhancing nodule and a second look ultrasound with US biopsy was advised.  She underwent biopsy demonstrating right IDC grade 2 ER 90% CO 50% Her2 negative.\par \par She denies dominant breast mass, skin changes or nipple discharge. \par \par Imaging:  [facility]\par 10/3/2018  Mammo screening ros implant BILAT\par Impression:  No radiographic evidence of significant breast pathology or developing malignancy.  In the absence of any new clinically worrisome findings and if the patient meets screening criteria, followup annual mammography performed with tomosynthesis would be advised.   BIRADS 1 negative\par \par 10/26/2018  MRI Breast PRE and POST IV contrast\par Impression:  Right breast upper inner quadrant irregular enhancing mass.  No correlate seen on screening mammogram and breast ultrasound performed 10/3/2018.  A targeted second look right breast ultrasound with ultrasound guided core biopsy of suspicious lesion is advised.  Recommendation:  Additional Imaging Evaluation.  BIRADS 0  Incomplete:  Needs additional imaging evaluation\par \par 11/8/2018  Pathology:  Infiltrating ductal carcinoma [place] score 7/9 ER 90% CO 50% HER2 negative\par 12/19/2018  Final surgical pathology:  See EMR\par \par We reviewed and discussed clinical breast exam and final surgical pathology.  We reviewed her Oncotype score of 20.  She has met with Dr. Goodwin is was hesitant at first to take Tamoxifen.  After a long discussion about Tamoxifen and the benefits she has agreed to take hormonal therapy.  We also discussed setting her up with gynecology-oncology for her BRCA1 genetic mutation.  She is to return to the office in 8 weeks.  She understands and agrees to plan.  All questions answered.

## 2019-03-04 NOTE — ASSESSMENT
[FreeTextEntry1] : 49 yo female presents with newly diagnosed right breast infiltrating carcinoma Histologic grade 7/9 ER 90% IA 50 % her2 negative.  She was BRCA 1 genetic mutation.   She underwent bilateral nipple sparring mastectomy with right SLNB and direct saline implants 12/19/2018.  The margins were clear and 0/3 lymph nodes negative for carcinoma.  Oncotype score was 20.  Recommendation for followup with Dr. Goodwin to begin Tamoxifen as per her recommendation, consult with gynecology -oncology for BRCA1 mutation, and  follow up in 8 weeks.\par 1.  Follow up in 8 weeks \par 2. Followup with medical oncology\par 3.  Consult with gynecology- oncology for BRCA 1 mutation

## 2019-03-07 ENCOUNTER — APPOINTMENT (OUTPATIENT)
Dept: GYNECOLOGIC ONCOLOGY | Facility: CLINIC | Age: 49
End: 2019-03-07
Payer: MEDICAID

## 2019-03-07 DIAGNOSIS — Z87.891 PERSONAL HISTORY OF NICOTINE DEPENDENCE: ICD-10-CM

## 2019-03-07 PROCEDURE — 99202 OFFICE O/P NEW SF 15 MIN: CPT | Mod: 25

## 2019-03-07 PROCEDURE — 76857 US EXAM PELVIC LIMITED: CPT | Mod: 59

## 2019-03-07 PROCEDURE — 76830 TRANSVAGINAL US NON-OB: CPT | Mod: 59

## 2019-03-07 NOTE — OB HISTORY
[Total Preg ___] : : [unfilled] [Full Term ___] : [unfilled] (full-term) [Abortions ___] : [unfilled] (abortions) [AB Spont ___] : [unfilled] miscarriage(s)

## 2019-03-07 NOTE — HISTORY OF PRESENT ILLNESS
[FreeTextEntry1] : 49yo  LMP 2018 diagnosed with breast cancer 2018. She is now s/p bilateral skin and nipple sparring mastectomy with right SLNB for right breast (T1c, N0,Mx) IDC grade 3 ER 90% [place] 50% Her2 negative  on 2018 with Dr. schwartz. Patient is BRCA positive. She is here today for consultation for possible prophylactic hysterectomy. She has no complaints at this time. \par \par LPAP-2018, normal\par \par

## 2019-03-07 NOTE — CHIEF COMPLAINT
[FreeTextEntry1] : [facility]\par \par [facility] [phone] at [address] [place] [id number]\par

## 2019-03-07 NOTE — END OF VISIT
[FreeTextEntry3] : This note accurately reflects the work and decisions made by me.\par  [FreeTextEntry2] : Written by Marlene Saunders PA-C, acting as scribe for Dr. Erick Cabrera.\par

## 2019-03-07 NOTE — ASSESSMENT
[FreeTextEntry1] : 49yo with hx of breast cancer stage 1C s/p bilateral mastectomy in December 2019, BRCA positive reports today for discussion of prophylactic hysterectomy.

## 2019-03-07 NOTE — LETTER BODY
[FreeTextEntry2] : Name: TRISTAN CANTU \par : 1970 \par \par Date of Visit: Mar 07, 2019 \par \par Dear  \par \par I recently saw our mutual patient, TRISTAN CANTU , in my office.\par \par Below, please see my findings.\par \par As always, it is my sincere pleasure to have the opportunity to participate in one of your patients' care. Please feel free to contact me with any questions or concerns that you may have regarding her care.\par \par Sincerely yours,\par \par Erick Cabrera MD\par

## 2019-03-13 LAB — CANCER AG125 SERPL-ACNC: 15 U/ML

## 2019-03-18 ENCOUNTER — OUTPATIENT (OUTPATIENT)
Dept: OUTPATIENT SERVICES | Facility: HOSPITAL | Age: 49
LOS: 1 days | Discharge: HOME | End: 2019-03-18

## 2019-03-18 DIAGNOSIS — Z98.890 OTHER SPECIFIED POSTPROCEDURAL STATES: Chronic | ICD-10-CM

## 2019-03-18 DIAGNOSIS — Z98.82 BREAST IMPLANT STATUS: Chronic | ICD-10-CM

## 2019-03-18 DIAGNOSIS — Z90.49 ACQUIRED ABSENCE OF OTHER SPECIFIED PARTS OF DIGESTIVE TRACT: Chronic | ICD-10-CM

## 2019-03-18 DIAGNOSIS — C50.919 MALIGNANT NEOPLASM OF UNSPECIFIED SITE OF UNSPECIFIED FEMALE BREAST: ICD-10-CM

## 2019-03-25 ENCOUNTER — APPOINTMENT (OUTPATIENT)
Dept: SURGERY | Facility: CLINIC | Age: 49
End: 2019-03-25

## 2019-05-02 ENCOUNTER — OUTPATIENT (OUTPATIENT)
Dept: OUTPATIENT SERVICES | Facility: HOSPITAL | Age: 49
LOS: 1 days | Discharge: HOME | End: 2019-05-02

## 2019-05-02 DIAGNOSIS — C50.919 MALIGNANT NEOPLASM OF UNSPECIFIED SITE OF UNSPECIFIED FEMALE BREAST: ICD-10-CM

## 2019-05-02 DIAGNOSIS — Z90.49 ACQUIRED ABSENCE OF OTHER SPECIFIED PARTS OF DIGESTIVE TRACT: Chronic | ICD-10-CM

## 2019-05-02 DIAGNOSIS — Z98.890 OTHER SPECIFIED POSTPROCEDURAL STATES: Chronic | ICD-10-CM

## 2019-05-02 DIAGNOSIS — Z98.82 BREAST IMPLANT STATUS: Chronic | ICD-10-CM

## 2019-05-06 ENCOUNTER — APPOINTMENT (OUTPATIENT)
Dept: HEMATOLOGY ONCOLOGY | Facility: CLINIC | Age: 49
End: 2019-05-06
Payer: MEDICAID

## 2019-05-06 ENCOUNTER — RESULT REVIEW (OUTPATIENT)
Age: 49
End: 2019-05-06

## 2019-05-06 VITALS
BODY MASS INDEX: 21.8 KG/M2 | OXYGEN SATURATION: 98 % | DIASTOLIC BLOOD PRESSURE: 79 MMHG | TEMPERATURE: 97.9 F | HEIGHT: 63 IN | HEART RATE: 89 BPM | SYSTOLIC BLOOD PRESSURE: 131 MMHG | WEIGHT: 123.03 LBS

## 2019-05-06 LAB
BASOPHILS # BLD AUTO: 0.1 K/UL — SIGNIFICANT CHANGE UP (ref 0–0.2)
BASOPHILS NFR BLD AUTO: 1 % — SIGNIFICANT CHANGE UP (ref 0–2)
EOSINOPHIL # BLD AUTO: 0 K/UL — SIGNIFICANT CHANGE UP (ref 0–0.5)
EOSINOPHIL NFR BLD AUTO: 0.8 % — SIGNIFICANT CHANGE UP (ref 0–6)
HCT VFR BLD CALC: 41.3 % — SIGNIFICANT CHANGE UP (ref 34.5–45)
HGB BLD-MCNC: 13.7 G/DL — SIGNIFICANT CHANGE UP (ref 11.5–15.5)
LYMPHOCYTES # BLD AUTO: 1.8 K/UL — SIGNIFICANT CHANGE UP (ref 1–3.3)
LYMPHOCYTES # BLD AUTO: 28.2 % — SIGNIFICANT CHANGE UP (ref 13–44)
MCHC RBC-ENTMCNC: 31.7 PG — SIGNIFICANT CHANGE UP (ref 27–34)
MCHC RBC-ENTMCNC: 33.2 G/DL — SIGNIFICANT CHANGE UP (ref 32–36)
MCV RBC AUTO: 95.4 FL — SIGNIFICANT CHANGE UP (ref 80–100)
MONOCYTES # BLD AUTO: 0.4 K/UL — SIGNIFICANT CHANGE UP (ref 0–0.9)
MONOCYTES NFR BLD AUTO: 6.7 % — SIGNIFICANT CHANGE UP (ref 2–14)
NEUTROPHILS # BLD AUTO: 3.9 K/UL — SIGNIFICANT CHANGE UP (ref 1.8–7.4)
NEUTROPHILS NFR BLD AUTO: 63.3 % — SIGNIFICANT CHANGE UP (ref 43–77)
PLATELET # BLD AUTO: 234 K/UL — SIGNIFICANT CHANGE UP (ref 150–400)
RBC # BLD: 4.33 M/UL — SIGNIFICANT CHANGE UP (ref 3.8–5.2)
RBC # FLD: 11.4 % — SIGNIFICANT CHANGE UP (ref 10.3–14.5)
WBC # BLD: 6.2 K/UL — SIGNIFICANT CHANGE UP (ref 3.8–10.5)
WBC # FLD AUTO: 6.2 K/UL — SIGNIFICANT CHANGE UP (ref 3.8–10.5)

## 2019-05-06 PROCEDURE — 99214 OFFICE O/P EST MOD 30 MIN: CPT

## 2019-05-06 NOTE — ADDENDUM
[FreeTextEntry1] : I, Akbar Austin, acted solely as a scribe for Dr. Damir Goodwin on this date 5/6/19.

## 2019-05-06 NOTE — HISTORY OF PRESENT ILLNESS
[Disease: _____________________] : Disease: [unfilled] [T: ___] : T[unfilled] [N: ___] : N[unfilled] [M: ___] : M[unfilled] [AJCC Stage: ____] : AJCC Stage: [unfilled] [de-identified] : Ms. Hardin was diagnosed with right breast IDC at the age of 48 in December 2018. Given positive family history, she tested positive for a BRCA1 mutation in 9/2018.\par The patient underwent screening imaging on 10/1/18 which only showed minimal bilateral fibrocystic nodularity but was otherwise a negative bilateral breast US. She then underwent an MRI on 10/26/18 which demonstrated a right upper inner quadrant irregular enhancing nodule and a second US with US biopsy was advised. \par \par An US guided biopsy was performed on 11/8/18 and pathology revealed infiltrative ductal carcinoma with no definitive lymphovascular invasion and no in situ component identified. Boone score 7/9, ER 90% [place] 50%, HER-2 negative. \par \par She is s/p bilateral mastectomy and sentinel lymph node biopsy on 12/19/18 with Dr. Vidales. \par Pathology:\par Right breast - poorly differentiated infiltrative ductal carcinoma,  Friant score of 8/9, 1.7 cm.   There was no definitive lymphovascular invasion or in situ component identified. The tumor showed brisk lymphocytic response and margins were negative. The skeletal muscle was negative for malignancy. The 3 lymph nodes were negative for tumor.  pT1c N0\par Left breast - negative for malignancy but had focal usual ductal hyperplasia and nodular dense fibrosis. \par \par Her Oncotype score is 20 and she is BRCA1 positive [id number] pathogenic mutation.\par \par She is otherwise healthy and denies any other medical problems. Menstrual period has been irregular, with LMP in December 2018. She continues to have discomfort and stiffness post-op.  She is usually very physically active in the gym and doing yoga, but is having trouble being less active. \par \par FMHx: breast cancer in paternal aunt and grandmother\par Social Hx: 8 year smoking history but quit 22 years ago, occasional drinker, 2 children and 1 stepchild (all girls, ages 17-22),  [de-identified] : poorly differentiated IDC [de-identified] : Ms. Hardin returns today for follow-up.\par She notes doing well and has no complaints today. She is doing yoga and is finally feeling more like herself. \par The remainder of her ROS is negative. \par She met with Dr. Cabrera regarding prophylactic ANIVAL/BSO and is considering doing it by 9/2019.  [de-identified] :  ER 90% [place] 50%, HER-2 negative

## 2019-05-06 NOTE — ASSESSMENT
[FreeTextEntry1] : 48 year old female, BRCA1 positive, with stage IA poorly differentiated IDC of right breast (T1cN0,  ER 90% [place] 50%, HER-2 negative) s/p bilateral mastectomy on 12/19/18.  Oncotype Dx 20 which is intermediate risk. Based on [employer] [facility] / Oncotype Dx 20, her absolute benefit from chemotherapy is 1.6% and the risks likely outweigh the benefits.  Tamoxifen instead risk of distance recurrence at 9 years is 6%.   \par \par We again reviewed rationale for adjuvant endocrine therapy for reducing distant recurrence risk. She still is not ready to begin Tamoxifen and doesn't like medications in general.  I strongly recommended starting Tamoxifen now and assessing toxicity in 2 months. Another less favorable option is endocrine therapy after her ANIVAL-BSO.  She's leaning towards the 2nd option.\par \par Plan: \par -RTO November 2019.  Patient will call if she decides to begin treatment w/ Tamoxifen sooner.\par -Follow up with Dr. Vidales\par

## 2019-05-07 ENCOUNTER — APPOINTMENT (OUTPATIENT)
Dept: SURGERY | Facility: CLINIC | Age: 49
End: 2019-05-07
Payer: MEDICAID

## 2019-05-07 PROCEDURE — 99214 OFFICE O/P EST MOD 30 MIN: CPT

## 2019-05-08 LAB
ALBUMIN SERPL ELPH-MCNC: 4.3 G/DL
ALP BLD-CCNC: 67 U/L
ALT SERPL-CCNC: 25 U/L
ANION GAP SERPL CALC-SCNC: 14 MMOL/L
AST SERPL-CCNC: 15 U/L
BILIRUB SERPL-MCNC: 0.2 MG/DL
BUN SERPL-MCNC: 12 MG/DL
CALCIUM SERPL-MCNC: 9.5 MG/DL
CHLORIDE SERPL-SCNC: 103 MMOL/L
CO2 SERPL-SCNC: 25 MMOL/L
CREAT SERPL-MCNC: 0.75 MG/DL
GLUCOSE SERPL-MCNC: 66 MG/DL
POTASSIUM SERPL-SCNC: 3.9 MMOL/L
PROT SERPL-MCNC: 6.4 G/DL
SODIUM SERPL-SCNC: 142 MMOL/L

## 2019-05-24 ENCOUNTER — APPOINTMENT (OUTPATIENT)
Dept: DERMATOLOGY | Facility: CLINIC | Age: 49
End: 2019-05-24

## 2019-06-04 ENCOUNTER — OUTPATIENT (OUTPATIENT)
Dept: OUTPATIENT SERVICES | Facility: HOSPITAL | Age: 49
LOS: 1 days | Discharge: HOME | End: 2019-06-04

## 2019-06-04 DIAGNOSIS — Z90.49 ACQUIRED ABSENCE OF OTHER SPECIFIED PARTS OF DIGESTIVE TRACT: Chronic | ICD-10-CM

## 2019-06-04 DIAGNOSIS — Z98.82 BREAST IMPLANT STATUS: Chronic | ICD-10-CM

## 2019-06-04 DIAGNOSIS — Z15.01 GENETIC SUSCEPTIBILITY TO MALIGNANT NEOPLASM OF BREAST: ICD-10-CM

## 2019-06-04 DIAGNOSIS — Z90.13 ACQUIRED ABSENCE OF BILATERAL BREASTS AND NIPPLES: Chronic | ICD-10-CM

## 2019-06-04 DIAGNOSIS — Z98.890 OTHER SPECIFIED POSTPROCEDURAL STATES: Chronic | ICD-10-CM

## 2019-06-04 DIAGNOSIS — Z90.10 ACQUIRED ABSENCE OF UNSPECIFIED BREAST AND NIPPLE: ICD-10-CM

## 2019-06-04 LAB
BASOPHILS # BLD AUTO: 0.04 K/UL — SIGNIFICANT CHANGE UP (ref 0–0.2)
BASOPHILS NFR BLD AUTO: 0.7 % — SIGNIFICANT CHANGE UP (ref 0–2)
EOSINOPHIL # BLD AUTO: 0.03 K/UL — SIGNIFICANT CHANGE UP (ref 0–0.5)
EOSINOPHIL NFR BLD AUTO: 0.5 % — SIGNIFICANT CHANGE UP (ref 0–6)
HCT VFR BLD CALC: 40.8 % — SIGNIFICANT CHANGE UP (ref 34.5–45)
HGB BLD-MCNC: 14.1 G/DL — SIGNIFICANT CHANGE UP (ref 11.5–15.5)
IMM GRANULOCYTES NFR BLD AUTO: 0.2 % — SIGNIFICANT CHANGE UP (ref 0–1.5)
LYMPHOCYTES # BLD AUTO: 1.73 K/UL — SIGNIFICANT CHANGE UP (ref 1–3.3)
LYMPHOCYTES # BLD AUTO: 29.6 % — SIGNIFICANT CHANGE UP (ref 13–44)
MCHC RBC-ENTMCNC: 32.5 PG — SIGNIFICANT CHANGE UP (ref 27–34)
MCHC RBC-ENTMCNC: 34.6 GM/DL — SIGNIFICANT CHANGE UP (ref 32–36)
MCV RBC AUTO: 94 FL — SIGNIFICANT CHANGE UP (ref 80–100)
MONOCYTES # BLD AUTO: 0.5 K/UL — SIGNIFICANT CHANGE UP (ref 0–0.9)
MONOCYTES NFR BLD AUTO: 8.6 % — SIGNIFICANT CHANGE UP (ref 2–14)
NEUTROPHILS # BLD AUTO: 3.53 K/UL — SIGNIFICANT CHANGE UP (ref 1.8–7.4)
NEUTROPHILS NFR BLD AUTO: 60.4 % — SIGNIFICANT CHANGE UP (ref 43–77)
PLATELET # BLD AUTO: 252 K/UL — SIGNIFICANT CHANGE UP (ref 150–400)
RBC # BLD: 4.34 M/UL — SIGNIFICANT CHANGE UP (ref 3.8–5.2)
RBC # FLD: 12.1 % — SIGNIFICANT CHANGE UP (ref 10.3–14.5)
WBC # BLD: 5.84 K/UL — SIGNIFICANT CHANGE UP (ref 3.8–10.5)
WBC # FLD AUTO: 5.84 K/UL — SIGNIFICANT CHANGE UP (ref 3.8–10.5)

## 2019-06-04 NOTE — H&P PST ADULT - NSICDXFAMILYHX_GEN_ALL_CORE_FT
FAMILY HISTORY:  Father  Still living? No  Family history of acute lymphoid leukemia, Age at diagnosis: Age Unknown    Grandparent  Still living? No  Family history of breast cancer, Age at diagnosis: Age Unknown  Family history of throat cancer, Age at diagnosis: Age Unknown    Aunt  Still living? Yes, Estimated age: Age Unknown  Family history of breast cancer, Age at diagnosis: Age Unknown

## 2019-06-04 NOTE — H&P PST ADULT - NSICDXPASTMEDICALHX_GEN_ALL_CORE_FT
PAST MEDICAL HISTORY:  Anxiety     BRCA positive     Breast cancer in female Right    Gastric polyp History of    History of gallbladder disease removed    Hyperlipidemia, unspecified hyperlipidemia type

## 2019-06-04 NOTE — ASU PATIENT PROFILE, ADULT - PSH
H/O breast augmentation  2004  History of cholecystectomy  1989  History of mastectomy, bilateral  with implants  History of other surgery  Liposuction

## 2019-06-04 NOTE — H&P PST ADULT - NSICDXPASTSURGICALHX_GEN_ALL_CORE_FT
6 PAST SURGICAL HISTORY:  H/O breast augmentation 2004    History of cholecystectomy 1989    History of mastectomy, bilateral with implants    History of other surgery Liposuction

## 2019-06-04 NOTE — CHART NOTE - NSCHARTNOTEFT_GEN_A_CORE
48 year old female presents to PST for second stage breast reconstruction     Plan:  1. Pre-surgical instructions given: NPO post midnight  2. Urine for pregnancy on day of surgery   3.  Labs  done as per surgeon request  4. Medical Evaluation with Dr Coyle pt c/o sore throat ; throat erythema on exam spoke with Keisha from Dr Lagunas office   5. EZ wash instructions given

## 2019-06-04 NOTE — H&P PST ADULT - HISTORY OF PRESENT ILLNESS
48 years old female with Right breast cancer. She is also BRCA positive. She  tested positive in September, 2018. Cousins x 2 and sister also tested positive after reoccurring breast cancer in aunt. She then had several tests including Mammogram, sonogram, MRI with contrast. s/p  mastectomy with reconstruction 12/2018 ; she presents to [facility] for second stage breast reconstruction with bilateral mastopexy fat grafting to breast

## 2019-06-04 NOTE — H&P PST ADULT - ASSESSMENT
48 years old female present to PST prior to :  Bilateral nipple sparing mastectomy with right sentinel lymph node biopsy, possible axillary dissection with Dr. Vida Vidales.    Direct to implant bilateral breast reconstruction with possible tissue expanders and alloderm with Dr. Lagunas.    Plan   1. NPO after midnight  2. Urine pregnancy on the day of surgery  3. Use E-Z sponge as directed  4. Use Mupirocin as directed.  5. Drink a quart of extra  fluids the day before your surgery.  6. CBC, BMP, PT/PTT/INR, Urinalysis, MRSA sent to lab  7. EKG  done    CAPRINI SCORE [CLOT]    AGE RELATED RISK FACTORS                                                       MOBILITY RELATED FACTORS  [x ] Age 41-60 years                                            (1 Point)                  [ ] Bed rest                                                        (1 Point)  [ ] Age: 61-74 years                                           (2 Points)                 [ ] Plaster cast                                                   (2 Points)  [ ] Age= 75 years                                              (3 Points)                 [ ] Bed bound for more than 72 hours                 (2 Points)    DISEASE RELATED RISK FACTORS                                               GENDER SPECIFIC FACTORS  [ ] Edema in the lower extremities                       (1 Point)                  [ ] Pregnancy                                                     (1 Point)  [ ] Varicose veins                                               (1 Point)                  [ ] Post-partum < 6 weeks                                   (1 Point)             [ ] BMI > 25 Kg/m2                                            (1 Point)                  [ ] Hormonal therapy  or oral contraception          (1 Point)                 [ ] Sepsis (in the previous month)                        (1 Point)                  [ ] History of pregnancy complications                 (1 point)  [ ] Pneumonia or serious lung disease                                               [ ] Unexplained or recurrent                     (1 Point)           (in the previous month)                               (1 Point)  [ ] Abnormal pulmonary function test                     (1 Point)                 SURGERY RELATED RISK FACTORS  [ ] Acute myocardial infarction                              (1 Point)                 [ ]  Section                                             (1 Point)  [ ] Congestive heart failure (in the previous month)  (1 Point)               [ ] Minor surgery                                                  (1 Point)   [ ] Inflammatory bowel disease                             (1 Point)                 [ ] Arthroscopic surgery                                        (2 Points)  [ ] Central venous access                                      (2 Points)                [x ] General surgery lasting more than 45 minutes   (2 Points)       [ ] Stroke (in the previous month)                          (5 Points)               [ ] Elective arthroplasty                                         (5 Points)            [ x] malignancy present or previous                      (2 points)                                                                                                                                   HEMATOLOGY RELATED FACTORS                                                 TRAUMA RELATED RISK FACTORS  [ ] Prior episodes of VTE                                     (3 Points)                 [ ] Fracture of the hip, pelvis, or leg                       (5 Points)  [ ] Positive family history for VTE                         (3 Points)                 [ ] Acute spinal cord injury (in the previous month)  (5 Points)  [ ] Prothrombin 72473 A                                     (3 Points)                 [ ] Paralysis  (less than 1 month)                             (5 Points)  [ ] Factor V Leiden                                             (3 Points)                  [ ] Multiple Trauma within 1 month                        (5 Points)  [ ] Lupus anticoagulants                                     (3 Points)                                                           [ ] Anticardiolipin antibodies                               (3 Points)                                                       [ ] High homocysteine in the blood                      (3 Points)                                             [ ] Other congenital or acquired thrombophilia      (3 Points)                                                [ ] Heparin induced thrombocytopenia                  (3 Points)                                          Total Score [    5      ]

## 2019-06-04 NOTE — ASU PATIENT PROFILE, ADULT - PMH
Anxiety    BRCA positive    Breast cancer in female  Right no chemo  Gastric polyp  History of  History of gallbladder disease  removed  Hyperlipidemia, unspecified hyperlipidemia type

## 2019-06-13 RX ORDER — ONDANSETRON 8 MG/1
4 TABLET, FILM COATED ORAL ONCE
Refills: 0 | Status: DISCONTINUED | OUTPATIENT
Start: 2019-06-14 | End: 2019-06-14

## 2019-06-13 RX ORDER — SODIUM CHLORIDE 9 MG/ML
1000 INJECTION, SOLUTION INTRAVENOUS
Refills: 0 | Status: DISCONTINUED | OUTPATIENT
Start: 2019-06-14 | End: 2019-06-14

## 2019-06-13 RX ORDER — FENTANYL CITRATE 50 UG/ML
50 INJECTION INTRAVENOUS
Refills: 0 | Status: DISCONTINUED | OUTPATIENT
Start: 2019-06-14 | End: 2019-06-14

## 2019-06-14 ENCOUNTER — RESULT REVIEW (OUTPATIENT)
Age: 49
End: 2019-06-14

## 2019-06-14 ENCOUNTER — OUTPATIENT (OUTPATIENT)
Dept: OUTPATIENT SERVICES | Facility: HOSPITAL | Age: 49
LOS: 1 days | Discharge: HOME | End: 2019-06-14
Payer: MEDICAID

## 2019-06-14 VITALS
DIASTOLIC BLOOD PRESSURE: 91 MMHG | HEART RATE: 86 BPM | TEMPERATURE: 98 F | WEIGHT: 120.37 LBS | SYSTOLIC BLOOD PRESSURE: 136 MMHG | RESPIRATION RATE: 16 BRPM | HEIGHT: 63 IN

## 2019-06-14 VITALS
HEART RATE: 91 BPM | DIASTOLIC BLOOD PRESSURE: 90 MMHG | SYSTOLIC BLOOD PRESSURE: 140 MMHG | RESPIRATION RATE: 14 BRPM | TEMPERATURE: 98 F | OXYGEN SATURATION: 100 %

## 2019-06-14 DIAGNOSIS — Z90.49 ACQUIRED ABSENCE OF OTHER SPECIFIED PARTS OF DIGESTIVE TRACT: Chronic | ICD-10-CM

## 2019-06-14 DIAGNOSIS — N65.0 DEFORMITY OF RECONSTRUCTED BREAST: ICD-10-CM

## 2019-06-14 DIAGNOSIS — Z98.890 OTHER SPECIFIED POSTPROCEDURAL STATES: Chronic | ICD-10-CM

## 2019-06-14 DIAGNOSIS — Z90.13 ACQUIRED ABSENCE OF BILATERAL BREASTS AND NIPPLES: Chronic | ICD-10-CM

## 2019-06-14 DIAGNOSIS — Z98.82 BREAST IMPLANT STATUS: Chronic | ICD-10-CM

## 2019-06-14 LAB — HCG UR QL: NEGATIVE — SIGNIFICANT CHANGE UP

## 2019-06-14 PROCEDURE — 88304 TISSUE EXAM BY PATHOLOGIST: CPT | Mod: 26

## 2019-06-14 RX ORDER — ONDANSETRON 8 MG/1
4 TABLET, FILM COATED ORAL EVERY 6 HOURS
Refills: 0 | Status: DISCONTINUED | OUTPATIENT
Start: 2019-06-14 | End: 2019-06-29

## 2019-06-14 RX ORDER — OXYCODONE HYDROCHLORIDE 5 MG/1
5 TABLET ORAL ONCE
Refills: 0 | Status: DISCONTINUED | OUTPATIENT
Start: 2019-06-14 | End: 2019-06-14

## 2019-06-14 RX ORDER — OXYCODONE AND ACETAMINOPHEN 5; 325 MG/1; MG/1
1 TABLET ORAL EVERY 4 HOURS
Refills: 0 | Status: DISCONTINUED | OUTPATIENT
Start: 2019-06-14 | End: 2019-06-14

## 2019-06-14 RX ORDER — OXYCODONE AND ACETAMINOPHEN 5; 325 MG/1; MG/1
2 TABLET ORAL EVERY 6 HOURS
Refills: 0 | Status: DISCONTINUED | OUTPATIENT
Start: 2019-06-14 | End: 2019-06-14

## 2019-06-14 RX ORDER — FAMOTIDINE 10 MG/ML
20 INJECTION INTRAVENOUS ONCE
Refills: 0 | Status: DISCONTINUED | OUTPATIENT
Start: 2019-06-14 | End: 2019-06-14

## 2019-06-14 RX ORDER — SODIUM CHLORIDE 9 MG/ML
3 INJECTION INTRAMUSCULAR; INTRAVENOUS; SUBCUTANEOUS EVERY 8 HOURS
Refills: 0 | Status: DISCONTINUED | OUTPATIENT
Start: 2019-06-14 | End: 2019-06-14

## 2019-06-14 RX ORDER — FAMOTIDINE 10 MG/ML
1 INJECTION INTRAVENOUS
Qty: 0 | Refills: 0 | DISCHARGE

## 2019-06-14 RX ORDER — MORPHINE SULFATE 50 MG/1
4 CAPSULE, EXTENDED RELEASE ORAL EVERY 4 HOURS
Refills: 0 | Status: DISCONTINUED | OUTPATIENT
Start: 2019-06-14 | End: 2019-06-14

## 2019-06-14 RX ORDER — ACETAMINOPHEN 500 MG
975 TABLET ORAL ONCE
Refills: 0 | Status: COMPLETED | OUTPATIENT
Start: 2019-06-14 | End: 2019-06-14

## 2019-06-14 RX ADMIN — FENTANYL CITRATE 50 MICROGRAM(S): 50 INJECTION INTRAVENOUS at 12:45

## 2019-06-14 RX ADMIN — OXYCODONE HYDROCHLORIDE 5 MILLIGRAM(S): 5 TABLET ORAL at 13:00

## 2019-06-14 RX ADMIN — OXYCODONE HYDROCHLORIDE 5 MILLIGRAM(S): 5 TABLET ORAL at 12:30

## 2019-06-14 RX ADMIN — FENTANYL CITRATE 50 MICROGRAM(S): 50 INJECTION INTRAVENOUS at 13:00

## 2019-06-14 RX ADMIN — FENTANYL CITRATE 50 MICROGRAM(S): 50 INJECTION INTRAVENOUS at 12:30

## 2019-06-14 RX ADMIN — Medication 975 MILLIGRAM(S): at 06:53

## 2019-06-14 RX ADMIN — ONDANSETRON 4 MILLIGRAM(S): 8 TABLET, FILM COATED ORAL at 13:55

## 2019-06-14 RX ADMIN — Medication 975 MILLIGRAM(S): at 06:59

## 2019-06-14 NOTE — ASU DISCHARGE PLAN (ADULT/PEDIATRIC) - ASU DC SPECIAL INSTRUCTIONSFT
You should follow-up in the office in 5-7 days, please call for follow-up appointment if you have not already made one. [phone].   Prescription pain medications have been prescribed for you, take as needed for pain only. Do not drive a vehicle or operate machinery while taking narcotic pain medication.   Ambulating is very important post operatively, make sure you ambulate several times daily.   If you experience bleeding that does not stop, swelling, hardness of surgical site, chest pain, shortness of breath, leg pain, dizziness or any signs or symptoms of infection such as fever, redness, pus, foul smell of surgical site please contact the office immediately, [phone].

## 2019-06-14 NOTE — BRIEF OPERATIVE NOTE - OPERATION/FINDINGS
Second stage breast reconstruction by bilateral breast mastopexy and fat grafting to the bilateral breast

## 2019-06-14 NOTE — H&P ADULT - NSICDXPASTSURGICALHX_GEN_ALL_CORE_FT
PAST SURGICAL HISTORY:  H/O breast augmentation 2004    History of cholecystectomy 1989    History of mastectomy, bilateral with implants    History of other surgery Liposuction

## 2019-06-14 NOTE — H&P ADULT - NSHPPHYSICALEXAM_GEN_ALL_CORE
Neuro: A+O x 4  Resp: CTA A/ P  CV: RRR S1 S2  Breast: contour irregularities   GI: Soft non tender + BS

## 2019-06-14 NOTE — H&P ADULT - NSICDXPASTMEDICALHX_GEN_ALL_CORE_FT
PAST MEDICAL HISTORY:  Anxiety     BRCA positive     Breast cancer in female Right no chemo    Gastric polyp History of    History of gallbladder disease removed    Hyperlipidemia, unspecified hyperlipidemia type

## 2019-06-14 NOTE — H&P ADULT - PROBLEM SELECTOR PLAN 1
Second stage breast reconstruction surgery   OOB to chair   pain management   advance diet as tolerated   DC to home when meeting asu dischage criteria

## 2019-06-14 NOTE — ASU DISCHARGE PLAN (ADULT/PEDIATRIC) - CALL YOUR DOCTOR IF YOU HAVE ANY OF THE FOLLOWING:
Unable to urinate/Swelling that gets worse/Fever greater than (need to indicate Fahrenheit or Celsius)/Wound/Surgical Site with redness, or foul smelling discharge or pus/Pain not relieved by Medications/Nausea and vomiting that does not stop/Bleeding that does not stop

## 2019-06-19 LAB — SURGICAL PATHOLOGY STUDY: SIGNIFICANT CHANGE UP

## 2019-06-25 DIAGNOSIS — Z90.13 ACQUIRED ABSENCE OF BILATERAL BREASTS AND NIPPLES: ICD-10-CM

## 2019-06-25 DIAGNOSIS — E78.5 HYPERLIPIDEMIA, UNSPECIFIED: ICD-10-CM

## 2019-06-25 DIAGNOSIS — N65.0 DEFORMITY OF RECONSTRUCTED BREAST: ICD-10-CM

## 2019-06-25 DIAGNOSIS — N65.1 DISPROPORTION OF RECONSTRUCTED BREAST: ICD-10-CM

## 2019-06-25 DIAGNOSIS — F41.9 ANXIETY DISORDER, UNSPECIFIED: ICD-10-CM

## 2019-06-25 DIAGNOSIS — Z90.49 ACQUIRED ABSENCE OF OTHER SPECIFIED PARTS OF DIGESTIVE TRACT: ICD-10-CM

## 2019-06-25 DIAGNOSIS — Z15.01 GENETIC SUSCEPTIBILITY TO MALIGNANT NEOPLASM OF BREAST: ICD-10-CM

## 2019-06-25 DIAGNOSIS — Z85.3 PERSONAL HISTORY OF MALIGNANT NEOPLASM OF BREAST: ICD-10-CM

## 2019-06-25 NOTE — H&P PST ADULT - CENTRAL VENOUS CATHETER
It was discussed and explained that after having RLE surgery, corneal astigmatism may / will be induced and will have to be addressed upon post operative stabilization. no

## 2019-09-10 NOTE — ASSESSMENT
[FreeTextEntry1] : 47 yo female presents with right breast infiltrating carcinoma Histologic grade 7/9 ER 90% [place] 50 % her2 negative.  She is BRCA 1 positve.   She underwent bilateral nipple sparring mastectomy with right SLNB and direct saline implants 12/19/2018.  The margins were clear and 0/3 lymph nodes negative for carcinoma.  Oncotype score was 20.  Recommendation for followup with Dr. Goodwin to begin Tamoxifen as per her recommendation and will follow up in 2 weeks to see how she is tolerating medication.  She will undergo [facility] BSO in September for BRCA mutation.\par 1.  Follow up in 12 weeks \par 2. Followup with medical oncology\par 3.  Follow up with gynecology- oncology for BRCA 1 mutation

## 2019-09-10 NOTE — HISTORY OF PRESENT ILLNESS
[FreeTextEntry1] : I had the pleasure of seeing PAVEL CANTU  in the office today for a followup visit. She is s/p bilateral skin and nipple sparring mastectomy with right SLNB for right breast (T1c, N0,Mx) IDC grade 3 ER 90% [place] 50% Her2 negative 12/19/2018.\par \par Pavel is the niece of Marlene Santos (BRCA1 positive).  She underwent screening imaging which came back Birads 1 negative.  She then underwent MRI 10/26/2018 which demonstrated a right upper inner quadrant irregular enhancing nodule and a second look ultrasound with US biopsy was advised.  She underwent biopsy demonstrating right IDC grade 2 ER 90% [place] 50% Her2 negative.  On 12/19/2018 she underwent bilateral skin sparring and nipple sparring mastectomy with right SLNB.\par \par She denies dominant breast mass, skin changes or nipple discharge. \par \par Imaging:  [facility]\par 10/3/2018  Mammo screening ros implant BILAT\par Impression:  No radiographic evidence of significant breast pathology or developing malignancy.  In the absence of any new clinically worrisome findings and if the patient meets screening criteria, followup annual mammography performed with tomosynthesis would be advised.   BIRADS 1 negative\par \par 10/26/2018  MRI Breast PRE and POST IV contrast\par Impression:  Right breast upper inner quadrant irregular enhancing mass.  No correlate seen on screening mammogram and breast ultrasound performed 10/3/2018.  A targeted second look right breast ultrasound with ultrasound guided core biopsy of suspicious lesion is advised.  Recommendation:  Additional Imaging Evaluation.  BIRADS 0  Incomplete:  Needs additional imaging evaluation\par \par 11/8/2018  Pathology:  Infiltrating ductal carcinoma Chamberlain score 7/9 ER 90% [place] 50% HER2 negative\par 12/19/2018  Final surgical pathology:  See EMR\par \par We reviewed and discussed clinical breast exam.  We reviewed her Oncotype score of 20.  She has met with Dr. Goodwin is was hesitant at first to take Tamoxifen and after a long discussion about Tamoxifen and the benefits she has agreed to take hormonal therapy.  She met with  gynecology-oncology for her BRCA1 genetic mutation and will undergo [facility] BSO in September and this was discussed with Dr. Cabrera at her consultation.  She is to return to the office in 3 months and will call in 2 weeks to see how she is tolerating hormonal therapy.  She understands and agrees to plan.  All questions answered.

## 2019-09-10 NOTE — PHYSICAL EXAM
[Normocephalic] : normocephalic [Atraumatic] : atraumatic [EOMI] : extra ocular movement intact [PERRL] : pupils equal, round and reactive to light [Sclera nonicteric] : sclera nonicteric [Supple] : supple [No Supraclavicular Adenopathy] : no supraclavicular adenopathy [Examined in the supine and seated position] : examined in the supine and seated position [No dominant masses] : no dominant masses in right breast  [No dominant masses] : no dominant masses left breast [No Nipple Retraction] : no left nipple retraction [No Nipple Discharge] : no left nipple discharge [Breast Nipple Retraction] : nipples not retracted [Breast Nipple Inversion] : nipples not inverted [Breast Nipple Flattening] : nipples not flattened [Breast Nipple Fissures] : nipples not fissured [Breast Abnormal Secretion Bloody Fluid] : no bloody discharge [Breast Abnormal Lactation (Galactorrhea)] : no galactorrhea [Breast Abnormal Secretion Opalescent Fluid] : no milky discharge [Breast Abnormal Secretion Serous Fluid] : no serous discharge [No Axillary Lymphadenopathy] : no left axillary lymphadenopathy [No Edema] : no edema [No Rashes] : no rashes [No Ulceration] : no ulceration [de-identified] : Mastectomy flap healing well.\par  [de-identified] : Mastectomy flap healing well.\par

## 2019-10-07 PROBLEM — F41.9 ANXIETY DISORDER, UNSPECIFIED: Chronic | Status: ACTIVE | Noted: 2019-06-04

## 2019-10-17 ENCOUNTER — APPOINTMENT (OUTPATIENT)
Dept: GYNECOLOGIC ONCOLOGY | Facility: CLINIC | Age: 49
End: 2019-10-17
Payer: MEDICAID

## 2019-10-17 PROCEDURE — 99214 OFFICE O/P EST MOD 30 MIN: CPT | Mod: 25

## 2019-10-17 PROCEDURE — 76857 US EXAM PELVIC LIMITED: CPT | Mod: 59

## 2019-10-17 PROCEDURE — 76830 TRANSVAGINAL US NON-OB: CPT | Mod: 59

## 2019-10-17 NOTE — ASSESSMENT
[FreeTextEntry1] : Pt is a 47 yo with BRCA1 mutation and right breast IDC T1c ER 90%, PR50% and Her2/alda negative s/p bilateral mastectomy and right SLND on 12/19/2018. Her US today with no concerning findings. Recommending robotic hysterectomy, BSO, cystoscopy.

## 2019-10-17 NOTE — HISTORY OF PRESENT ILLNESS
[FreeTextEntry1] : Pt is a 49 yo with BRCA1 mutation and s/p bilateral skin and nipple sparring mastectomy with right SLNB for right breast (T1c, N0,Mx) IDC grade 3 ER 90% [place] 50% Her2 negative 12/19/2018. She desires prophylactic hysterectomy and BSO. She follows up for ultrasound. She missed appointment in august.

## 2019-10-17 NOTE — DISCUSSION/SUMMARY
[FreeTextEntry1] : I discussed at length with the patient the nature, purpose, risks, benefits, and alternatives of robotic-assisted laparoscopic hysterectomy with bilateral salpingo-oophorectomy, cystoscopy.  The patient understands the risks to include (but not be limited to) bowel injury, bleeding (with the possible need for transfusion), bladder or ureteral injury, infections, deep venous thrombosis, and mylene-operative death.  The patient also understands that her surgery may not be able to be performed laparoscopically and that she may need a laparotomy (either vertical midline or pfannensteil).  She also understands the limitations of laparoscopic surgery and the possibility of missing a surgical complication with need for subsequent re-exploration.  She agrees to proceed.  She asked numerous questions which were answered to her satisfaction.  She understands the need for a pre-operative bowel preparation and agrees to comply with our instructions.  She also understands the rationale for a cystoscopy at the completion of the procedure and the potential risks of cystoscopy.

## 2019-11-15 ENCOUNTER — OUTPATIENT (OUTPATIENT)
Dept: OUTPATIENT SERVICES | Facility: HOSPITAL | Age: 49
LOS: 1 days | Discharge: HOME | End: 2019-11-15

## 2019-11-15 DIAGNOSIS — Z90.49 ACQUIRED ABSENCE OF OTHER SPECIFIED PARTS OF DIGESTIVE TRACT: Chronic | ICD-10-CM

## 2019-11-15 DIAGNOSIS — C50.919 MALIGNANT NEOPLASM OF UNSPECIFIED SITE OF UNSPECIFIED FEMALE BREAST: ICD-10-CM

## 2019-11-15 DIAGNOSIS — Z90.13 ACQUIRED ABSENCE OF BILATERAL BREASTS AND NIPPLES: Chronic | ICD-10-CM

## 2019-11-15 DIAGNOSIS — Z98.890 OTHER SPECIFIED POSTPROCEDURAL STATES: Chronic | ICD-10-CM

## 2019-11-15 DIAGNOSIS — Z98.82 BREAST IMPLANT STATUS: Chronic | ICD-10-CM

## 2019-11-19 ENCOUNTER — APPOINTMENT (OUTPATIENT)
Dept: HEMATOLOGY ONCOLOGY | Facility: CLINIC | Age: 49
End: 2019-11-19

## 2020-02-03 ENCOUNTER — APPOINTMENT (OUTPATIENT)
Dept: SURGERY | Facility: CLINIC | Age: 50
End: 2020-02-03
Payer: MEDICAID

## 2020-02-03 ENCOUNTER — RESULT REVIEW (OUTPATIENT)
Age: 50
End: 2020-02-03

## 2020-02-03 ENCOUNTER — APPOINTMENT (OUTPATIENT)
Dept: HEMATOLOGY ONCOLOGY | Facility: CLINIC | Age: 50
End: 2020-02-03
Payer: MEDICAID

## 2020-02-03 ENCOUNTER — OUTPATIENT (OUTPATIENT)
Dept: OUTPATIENT SERVICES | Facility: HOSPITAL | Age: 50
LOS: 1 days | Discharge: HOME | End: 2020-02-03

## 2020-02-03 VITALS
HEIGHT: 63 IN | DIASTOLIC BLOOD PRESSURE: 82 MMHG | OXYGEN SATURATION: 99 % | SYSTOLIC BLOOD PRESSURE: 123 MMHG | BODY MASS INDEX: 21.45 KG/M2 | WEIGHT: 121.04 LBS | HEART RATE: 68 BPM

## 2020-02-03 DIAGNOSIS — Z98.890 OTHER SPECIFIED POSTPROCEDURAL STATES: Chronic | ICD-10-CM

## 2020-02-03 DIAGNOSIS — Z90.13 ACQUIRED ABSENCE OF BILATERAL BREASTS AND NIPPLES: Chronic | ICD-10-CM

## 2020-02-03 DIAGNOSIS — Z90.49 ACQUIRED ABSENCE OF OTHER SPECIFIED PARTS OF DIGESTIVE TRACT: Chronic | ICD-10-CM

## 2020-02-03 DIAGNOSIS — C50.919 MALIGNANT NEOPLASM OF UNSPECIFIED SITE OF UNSPECIFIED FEMALE BREAST: ICD-10-CM

## 2020-02-03 DIAGNOSIS — Z98.82 BREAST IMPLANT STATUS: Chronic | ICD-10-CM

## 2020-02-03 LAB
BASOPHILS # BLD AUTO: 0.1 K/UL — SIGNIFICANT CHANGE UP (ref 0–0.2)
BASOPHILS NFR BLD AUTO: 2.7 % — HIGH (ref 0–2)
EOSINOPHIL # BLD AUTO: 0.1 K/UL — SIGNIFICANT CHANGE UP (ref 0–0.5)
EOSINOPHIL NFR BLD AUTO: 2.9 % — SIGNIFICANT CHANGE UP (ref 0–6)
HCT VFR BLD CALC: 40.9 % — SIGNIFICANT CHANGE UP (ref 34.5–45)
HGB BLD-MCNC: 13.8 G/DL — SIGNIFICANT CHANGE UP (ref 11.5–15.5)
LYMPHOCYTES # BLD AUTO: 1.3 K/UL — SIGNIFICANT CHANGE UP (ref 1–3.3)
LYMPHOCYTES # BLD AUTO: 39.5 % — SIGNIFICANT CHANGE UP (ref 13–44)
MCHC RBC-ENTMCNC: 31.6 PG — SIGNIFICANT CHANGE UP (ref 27–34)
MCHC RBC-ENTMCNC: 33.6 G/DL — SIGNIFICANT CHANGE UP (ref 32–36)
MCV RBC AUTO: 93.8 FL — SIGNIFICANT CHANGE UP (ref 80–100)
MONOCYTES # BLD AUTO: 0.5 K/UL — SIGNIFICANT CHANGE UP (ref 0–0.9)
MONOCYTES NFR BLD AUTO: 14.1 % — HIGH (ref 2–14)
NEUTROPHILS # BLD AUTO: 1.4 K/UL — LOW (ref 1.8–7.4)
NEUTROPHILS NFR BLD AUTO: 40.8 % — LOW (ref 43–77)
PLATELET # BLD AUTO: 243 K/UL — SIGNIFICANT CHANGE UP (ref 150–400)
RBC # BLD: 4.36 M/UL — SIGNIFICANT CHANGE UP (ref 3.8–5.2)
RBC # FLD: 11.5 % — SIGNIFICANT CHANGE UP (ref 10.3–14.5)
WBC # BLD: 3.4 K/UL — LOW (ref 3.8–10.5)
WBC # FLD AUTO: 3.4 K/UL — LOW (ref 3.8–10.5)

## 2020-02-03 PROCEDURE — 99215 OFFICE O/P EST HI 40 MIN: CPT

## 2020-02-03 PROCEDURE — 99214 OFFICE O/P EST MOD 30 MIN: CPT

## 2020-02-03 NOTE — PHYSICAL EXAM
[Normocephalic] : normocephalic [PERRL] : pupils equal, round and reactive to light [Atraumatic] : atraumatic [EOMI] : extra ocular movement intact [Sclera nonicteric] : sclera nonicteric [Supple] : supple [Examined in the supine and seated position] : examined in the supine and seated position [No Supraclavicular Adenopathy] : no supraclavicular adenopathy [No dominant masses] : no dominant masses in right breast  [No Nipple Retraction] : no left nipple retraction [No dominant masses] : no dominant masses left breast [No Nipple Discharge] : no right nipple discharge [Breast Nipple Inversion] : nipples not inverted [Breast Nipple Retraction] : nipples not retracted [Breast Nipple Flattening] : nipples not flattened [Breast Abnormal Lactation (Galactorrhea)] : no galactorrhea [Breast Nipple Fissures] : nipples not fissured [Breast Abnormal Secretion Bloody Fluid] : no bloody discharge [Breast Abnormal Secretion Opalescent Fluid] : no milky discharge [Breast Abnormal Secretion Serous Fluid] : no serous discharge [No Axillary Lymphadenopathy] : no left axillary lymphadenopathy [No Edema] : no edema [No Rashes] : no rashes [No Ulceration] : no ulceration [de-identified] : Mastectomy flap healing well.\par  [de-identified] : Mastectomy flap healing well.\par

## 2020-02-03 NOTE — ASSESSMENT
[FreeTextEntry1] : 49 year old female, BRCA1 positive, with stage IA poorly differentiated IDC of right breast (T1cN0,  ER 90% [place] 50%, HER-2 negative) s/p bilateral mastectomy on 12/19/18.  Oncotype Dx 20 which is intermediate risk. Based on [employer] Rx / Oncotype Dx 20, her absolute benefit from chemotherapy is 1.6% and the risks likely outweigh the benefits.  Tamoxifen instead risk of distance recurrence at 9 years is 6%.   She has not started Tamoxifen as yet. \par \par We again explored her hesitations about endocrine therapy and reviewed the rationale is to reduce risk of distant recurrence. She is willing to give Tamoxifen a try, and can consider switching to AI after her BSO if she does not tolerate Tamoxifen well.\par \par \par Plan:\par -Start Tamoxifen, stop Wellbutrin XL as it will make Tamoxifen less effective\par -if she has hot flashes, can try Effexor \par -stop Estroven - due to it containing soy isoflavins which are being taken on a consistent basis, do not need seek out to avoid soy foods\par -BRCA1 - prophylactic BSO is planned for later this month\par -leukopenia - ANC 1400, no B symptoms, continue to monitor\par -F/u in 6 months \par

## 2020-02-03 NOTE — HISTORY OF PRESENT ILLNESS
[Disease: _____________________] : Disease: [unfilled] [T: ___] : T[unfilled] [N: ___] : N[unfilled] [M: ___] : M[unfilled] [AJCC Stage: ____] : AJCC Stage: [unfilled] [de-identified] : poorly differentiated IDC [de-identified] : Ms. Hardin was diagnosed with right breast IDC at the age of 48 in December 2018. Given positive family history, she tested positive for a BRCA1 mutation in 9/2018.\par The patient underwent screening imaging on 10/1/18 which only showed minimal bilateral fibrocystic nodularity but was otherwise a negative bilateral breast US. She then underwent an MRI on 10/26/18 which demonstrated a right upper inner quadrant irregular enhancing nodule and a second US with US biopsy was advised. \par \par An US guided biopsy was performed on 11/8/18 and pathology revealed infiltrative ductal carcinoma with no definitive lymphovascular invasion and no in situ component identified. Boone score 7/9, ER 90% ME 50%, HER-2 negative. \par \par She is s/p bilateral mastectomy and sentinel lymph node biopsy on 12/19/18 with Dr. Vidales. \par Pathology:\par Right breast - poorly differentiated infiltrative ductal carcinoma,  [place]erry score of 8/9, 1.7 cm.   There was no definitive lymphovascular invasion or in situ component identified. The tumor showed brisk lymphocytic response and margins were negative. The skeletal muscle was negative for malignancy. The 3 lymph nodes were negative for tumor.  pT1c N0\par Left breast - negative for malignancy but had focal usual ductal hyperplasia and nodular dense fibrosis. \par \par Her Oncotype score is 20 and she is BRCA1 positive [id number] pathogenic mutation.\par \par She is otherwise healthy and denies any other medical problems. Menstrual period has been irregular, with LMP in December 2018. \par \par FMHx: breast cancer in paternal aunt and grandmother\par Social Hx: 8 year smoking history but quit 22 years ago, occasional drinker, 2 children and 1 stepchild (all girls, ages 17-22),  [de-identified] :  ER 90% WI 50%, HER-2 negative [de-identified] : Ms. Hardin returns for follow-up. \par She did not start Tamoxifen. \par She continues to do yoga regularly. \par Started on Estroven for hot flashes and notes improvement. \par She has not been sleeping well. She says that she always has a lot on her mind and feels on edge. She is on Wellbutrin for anxiety but does not take it regularly. \par She met with Dr. Cabrera regarding prophylactic ANIVAL/BSO and is scheduled for a hysterectomy end of February 2020.

## 2020-02-03 NOTE — HISTORY OF PRESENT ILLNESS
[FreeTextEntry1] : I had the pleasure of seeing PAVEL CANTU  in the office today for a followup visit. She is s/p bilateral skin and nipple sparring mastectomy with right SLNB for right breast (T1c, N0,Mx) IDC grade 3 ER 90% RI 50% Her2 negative 12/19/2018.\par \par Pavel is the niece of Marlene Santos (BRCA1 positive).  She underwent screening imaging which came back Birads 1 negative.  She then underwent MRI 10/26/2018 which demonstrated a right upper inner quadrant irregular enhancing nodule and a second look ultrasound with US biopsy was advised.  She underwent biopsy demonstrating right IDC grade 2 ER 90% RI 50% Her2 negative.  On 12/19/2018 she underwent bilateral skin sparring and nipple sparring mastectomy with right SLNB.\par \par She denies dominant breast mass, skin changes or nipple discharge. \par \par Imaging:  [facility]\par 10/3/2018  Mammo screening ros implant BILAT\par Impression:  No radiographic evidence of significant breast pathology or developing malignancy.  In the absence of any new clinically worrisome findings and if the patient meets screening criteria, followup annual mammography performed with tomosynthesis would be advised.   BIRADS 1 negative\par \par 10/26/2018  MRI Breast PRE and POST IV contrast\par Impression:  Right breast upper inner quadrant irregular enhancing mass.  No correlate seen on screening mammogram and breast ultrasound performed 10/3/2018.  A targeted second look right breast ultrasound with ultrasound guided core biopsy of suspicious lesion is advised.  Recommendation:  Additional Imaging Evaluation.  BIRADS 0  Incomplete:  Needs additional imaging evaluation\par \par 11/8/2018  Pathology:  Infiltrating ductal carcinoma [place] score 7/9 ER 90% RI 50% HER2 negative\par 12/19/2018  Final surgical pathology:  See EMR\par \par We reviewed and discussed clinical breast exam.  She met with  gynecology-oncology for her BRCA1 genetic mutation and will undergo [facility] BSO at the end of February. She then agreed to take Aromasin inhibitor after surgery after her discussion with Dr. Goodwin today.  Her clinical breast exam is benign.  Recommendation for follow up in 6 months and continue hormonal therapy.  She is also to follow up with  for revision of implants.

## 2020-02-03 NOTE — PHYSICAL EXAM
[Normal] : affect appropriate [de-identified] : supple [de-identified] : clear [de-identified] : S1/S2 [de-identified] : no edema [de-identified] : soft, ND, NT

## 2020-02-03 NOTE — ASSESSMENT
[FreeTextEntry1] : 50 yo female presents with right breast infiltrating carcinoma Histologic grade 7/9 ER 90% NH 50 % her2 negative.  She is BRCA 1 positve.   She underwent bilateral nipple sparring mastectomy with right SLNB and direct saline implants 12/19/2018.  The margins were clear and 0/3 lymph nodes negative for carcinoma.  Oncotype score was 20.  She will undergo RA TLH BSO in February for BRCA mutation.  She has then agreed to take Aromasin after surgery in February.  Recommendation\par 1.  Follow up in 6 months\par 2. Followup with medical oncology\par 3.  Follow up with gynecology- oncology for BRCA 1 mutation\par 4.  Follow up with Dr. Lagunas for breast reconstruction revision

## 2020-02-05 LAB
ALBUMIN SERPL ELPH-MCNC: 4.5 G/DL
ALP BLD-CCNC: 94 U/L
ALT SERPL-CCNC: 34 U/L
ANION GAP SERPL CALC-SCNC: 10 MMOL/L
AST SERPL-CCNC: 21 U/L
BILIRUB SERPL-MCNC: 0.2 MG/DL
BUN SERPL-MCNC: 15 MG/DL
CALCIUM SERPL-MCNC: 9.3 MG/DL
CHLORIDE SERPL-SCNC: 104 MMOL/L
CO2 SERPL-SCNC: 26 MMOL/L
CREAT SERPL-MCNC: 0.77 MG/DL
POTASSIUM SERPL-SCNC: 4 MMOL/L
PROT SERPL-MCNC: 6.5 G/DL
SODIUM SERPL-SCNC: 140 MMOL/L

## 2020-02-19 ENCOUNTER — OUTPATIENT (OUTPATIENT)
Dept: OUTPATIENT SERVICES | Facility: HOSPITAL | Age: 50
LOS: 1 days | End: 2020-02-19
Payer: MEDICAID

## 2020-02-19 VITALS
HEIGHT: 63 IN | SYSTOLIC BLOOD PRESSURE: 132 MMHG | TEMPERATURE: 98 F | RESPIRATION RATE: 20 BRPM | HEART RATE: 84 BPM | WEIGHT: 121.92 LBS | DIASTOLIC BLOOD PRESSURE: 66 MMHG | OXYGEN SATURATION: 100 %

## 2020-02-19 DIAGNOSIS — Z98.890 OTHER SPECIFIED POSTPROCEDURAL STATES: Chronic | ICD-10-CM

## 2020-02-19 DIAGNOSIS — Z90.13 ACQUIRED ABSENCE OF BILATERAL BREASTS AND NIPPLES: Chronic | ICD-10-CM

## 2020-02-19 DIAGNOSIS — Z90.49 ACQUIRED ABSENCE OF OTHER SPECIFIED PARTS OF DIGESTIVE TRACT: Chronic | ICD-10-CM

## 2020-02-19 DIAGNOSIS — Z15.09 GENETIC SUSCEPTIBILITY TO OTHER MALIGNANT NEOPLASM: ICD-10-CM

## 2020-02-19 DIAGNOSIS — Z01.818 ENCOUNTER FOR OTHER PREPROCEDURAL EXAMINATION: ICD-10-CM

## 2020-02-19 DIAGNOSIS — Z98.82 BREAST IMPLANT STATUS: Chronic | ICD-10-CM

## 2020-02-19 LAB
ANION GAP SERPL CALC-SCNC: 5 MMOL/L — SIGNIFICANT CHANGE UP (ref 5–17)
BASOPHILS # BLD AUTO: 0.05 K/UL — SIGNIFICANT CHANGE UP (ref 0–0.2)
BASOPHILS NFR BLD AUTO: 1.2 % — SIGNIFICANT CHANGE UP (ref 0–2)
BUN SERPL-MCNC: 18 MG/DL — SIGNIFICANT CHANGE UP (ref 7–23)
CALCIUM SERPL-MCNC: 9 MG/DL — SIGNIFICANT CHANGE UP (ref 8.5–10.1)
CHLORIDE SERPL-SCNC: 109 MMOL/L — HIGH (ref 96–108)
CO2 SERPL-SCNC: 28 MMOL/L — SIGNIFICANT CHANGE UP (ref 22–31)
CREAT SERPL-MCNC: 0.88 MG/DL — SIGNIFICANT CHANGE UP (ref 0.5–1.3)
EOSINOPHIL # BLD AUTO: 0.07 K/UL — SIGNIFICANT CHANGE UP (ref 0–0.5)
EOSINOPHIL NFR BLD AUTO: 1.7 % — SIGNIFICANT CHANGE UP (ref 0–6)
GLUCOSE SERPL-MCNC: 78 MG/DL — SIGNIFICANT CHANGE UP (ref 70–99)
HBA1C BLD-MCNC: 5.4 % — SIGNIFICANT CHANGE UP (ref 4–5.6)
HCT VFR BLD CALC: 41.9 % — SIGNIFICANT CHANGE UP (ref 34.5–45)
HGB BLD-MCNC: 14.4 G/DL — SIGNIFICANT CHANGE UP (ref 11.5–15.5)
IMM GRANULOCYTES NFR BLD AUTO: 0.2 % — SIGNIFICANT CHANGE UP (ref 0–1.5)
LYMPHOCYTES # BLD AUTO: 1.58 K/UL — SIGNIFICANT CHANGE UP (ref 1–3.3)
LYMPHOCYTES # BLD AUTO: 38.3 % — SIGNIFICANT CHANGE UP (ref 13–44)
MCHC RBC-ENTMCNC: 32.4 PG — SIGNIFICANT CHANGE UP (ref 27–34)
MCHC RBC-ENTMCNC: 34.4 GM/DL — SIGNIFICANT CHANGE UP (ref 32–36)
MCV RBC AUTO: 94.4 FL — SIGNIFICANT CHANGE UP (ref 80–100)
MONOCYTES # BLD AUTO: 0.48 K/UL — SIGNIFICANT CHANGE UP (ref 0–0.9)
MONOCYTES NFR BLD AUTO: 11.6 % — SIGNIFICANT CHANGE UP (ref 2–14)
NEUTROPHILS # BLD AUTO: 1.94 K/UL — SIGNIFICANT CHANGE UP (ref 1.8–7.4)
NEUTROPHILS NFR BLD AUTO: 47 % — SIGNIFICANT CHANGE UP (ref 43–77)
PLATELET # BLD AUTO: 256 K/UL — SIGNIFICANT CHANGE UP (ref 150–400)
POTASSIUM SERPL-MCNC: 4 MMOL/L — SIGNIFICANT CHANGE UP (ref 3.5–5.3)
POTASSIUM SERPL-SCNC: 4 MMOL/L — SIGNIFICANT CHANGE UP (ref 3.5–5.3)
RBC # BLD: 4.44 M/UL — SIGNIFICANT CHANGE UP (ref 3.8–5.2)
RBC # FLD: 12.3 % — SIGNIFICANT CHANGE UP (ref 10.3–14.5)
SODIUM SERPL-SCNC: 142 MMOL/L — SIGNIFICANT CHANGE UP (ref 135–145)
WBC # BLD: 4.13 K/UL — SIGNIFICANT CHANGE UP (ref 3.8–10.5)
WBC # FLD AUTO: 4.13 K/UL — SIGNIFICANT CHANGE UP (ref 3.8–10.5)

## 2020-02-19 PROCEDURE — 36415 COLL VENOUS BLD VENIPUNCTURE: CPT

## 2020-02-19 PROCEDURE — 93010 ELECTROCARDIOGRAM REPORT: CPT

## 2020-02-19 PROCEDURE — 85025 COMPLETE CBC W/AUTO DIFF WBC: CPT

## 2020-02-19 PROCEDURE — 80048 BASIC METABOLIC PNL TOTAL CA: CPT

## 2020-02-19 PROCEDURE — 86850 RBC ANTIBODY SCREEN: CPT

## 2020-02-19 PROCEDURE — G0463: CPT | Mod: 25

## 2020-02-19 PROCEDURE — 93005 ELECTROCARDIOGRAM TRACING: CPT

## 2020-02-19 PROCEDURE — 83036 HEMOGLOBIN GLYCOSYLATED A1C: CPT

## 2020-02-19 PROCEDURE — 86900 BLOOD TYPING SEROLOGIC ABO: CPT

## 2020-02-19 PROCEDURE — 86901 BLOOD TYPING SEROLOGIC RH(D): CPT

## 2020-02-19 NOTE — H&P PST ADULT - TEACHING/LEARNING LEARNING PREFERENCES
video/written material/verbal instruction/audio/group instruction/computer/internet/pictorial/skill demonstration/individual instruction

## 2020-02-19 NOTE — H&P PST ADULT - ASSESSMENT
50 y/o female with PMH of breast ca, BRCA positive. Scheduled for Robotic assisted total laparoscopic hysterectomy, bilateral salpingo oophorectomy.  Plan:  1. Stop all NSAIDS, herbal supplements and vitamins for 7 days.  2. NPO as per ASU instructions.  3. Take the following medications ( Wellbutrin ) with small sips of water on the morning of your procedure/surgery.  4. Use EZ sponges as directed  5. Labs, EKG as per surgeon. STAT urine pregnancy on admission.  6. PMD visit for optimization prior to surgery as per surgeon

## 2020-02-19 NOTE — H&P PST ADULT - HISTORY OF PRESENT ILLNESS
48 y/o female with PMH of breast ca, BRCA positive. Here today for PST for scheduled Robotic assisted total laparoscopic hysterectomy, bilateral salpingo oophorectomy.

## 2020-02-19 NOTE — H&P PST ADULT - NSICDXPASTSURGICALHX_GEN_ALL_CORE_FT
PAST SURGICAL HISTORY:  H/O breast augmentation 2004    History of cholecystectomy 1989    History of mastectomy, bilateral with implants    History of other surgery Liposuction    S/P breast reconstruction, bilateral 06/2019

## 2020-02-20 DIAGNOSIS — Z15.09 GENETIC SUSCEPTIBILITY TO OTHER MALIGNANT NEOPLASM: ICD-10-CM

## 2020-02-20 DIAGNOSIS — Z01.818 ENCOUNTER FOR OTHER PREPROCEDURAL EXAMINATION: ICD-10-CM

## 2020-02-23 ENCOUNTER — FORM ENCOUNTER (OUTPATIENT)
Age: 50
End: 2020-02-23

## 2020-02-24 ENCOUNTER — OUTPATIENT (OUTPATIENT)
Dept: INPATIENT UNIT | Facility: HOSPITAL | Age: 50
LOS: 1 days | Discharge: HOME | End: 2020-02-24
Payer: MEDICAID

## 2020-02-24 ENCOUNTER — RESULT REVIEW (OUTPATIENT)
Age: 50
End: 2020-02-24

## 2020-02-24 ENCOUNTER — TRANSCRIPTION ENCOUNTER (OUTPATIENT)
Age: 50
End: 2020-02-24

## 2020-02-24 VITALS
WEIGHT: 119.93 LBS | RESPIRATION RATE: 15 BRPM | HEART RATE: 87 BPM | DIASTOLIC BLOOD PRESSURE: 83 MMHG | SYSTOLIC BLOOD PRESSURE: 138 MMHG | OXYGEN SATURATION: 100 % | TEMPERATURE: 98 F | HEIGHT: 63 IN

## 2020-02-24 DIAGNOSIS — Z98.890 OTHER SPECIFIED POSTPROCEDURAL STATES: Chronic | ICD-10-CM

## 2020-02-24 DIAGNOSIS — Z90.49 ACQUIRED ABSENCE OF OTHER SPECIFIED PARTS OF DIGESTIVE TRACT: Chronic | ICD-10-CM

## 2020-02-24 DIAGNOSIS — Z15.09 GENETIC SUSCEPTIBILITY TO OTHER MALIGNANT NEOPLASM: ICD-10-CM

## 2020-02-24 DIAGNOSIS — Z90.13 ACQUIRED ABSENCE OF BILATERAL BREASTS AND NIPPLES: Chronic | ICD-10-CM

## 2020-02-24 DIAGNOSIS — Z98.82 BREAST IMPLANT STATUS: Chronic | ICD-10-CM

## 2020-02-24 LAB — HCG UR QL: NEGATIVE — SIGNIFICANT CHANGE UP

## 2020-02-24 PROCEDURE — S2900: CPT

## 2020-02-24 PROCEDURE — 81025 URINE PREGNANCY TEST: CPT

## 2020-02-24 PROCEDURE — 80048 BASIC METABOLIC PNL TOTAL CA: CPT

## 2020-02-24 PROCEDURE — 88104 CYTOPATH FL NONGYN SMEARS: CPT | Mod: 26

## 2020-02-24 PROCEDURE — 58571 TLH W/T/O 250 G OR LESS: CPT

## 2020-02-24 PROCEDURE — 36415 COLL VENOUS BLD VENIPUNCTURE: CPT

## 2020-02-24 PROCEDURE — 88104 CYTOPATH FL NONGYN SMEARS: CPT

## 2020-02-24 PROCEDURE — 57425 LAPAROSCOPY SURG COLPOPEXY: CPT | Mod: 59

## 2020-02-24 PROCEDURE — 57425 LAPAROSCOPY SURG COLPOPEXY: CPT | Mod: 80,59

## 2020-02-24 PROCEDURE — 82962 GLUCOSE BLOOD TEST: CPT

## 2020-02-24 PROCEDURE — 83735 ASSAY OF MAGNESIUM: CPT

## 2020-02-24 PROCEDURE — 85025 COMPLETE CBC W/AUTO DIFF WBC: CPT

## 2020-02-24 PROCEDURE — 88307 TISSUE EXAM BY PATHOLOGIST: CPT | Mod: 26

## 2020-02-24 PROCEDURE — 58571 TLH W/T/O 250 G OR LESS: CPT | Mod: 80

## 2020-02-24 PROCEDURE — 88305 TISSUE EXAM BY PATHOLOGIST: CPT

## 2020-02-24 PROCEDURE — 88307 TISSUE EXAM BY PATHOLOGIST: CPT

## 2020-02-24 PROCEDURE — 88305 TISSUE EXAM BY PATHOLOGIST: CPT | Mod: 26

## 2020-02-24 RX ORDER — BUPROPION HYDROCHLORIDE 150 MG/1
150 TABLET, EXTENDED RELEASE ORAL
Refills: 0 | Status: DISCONTINUED | OUTPATIENT
Start: 2020-02-24 | End: 2020-02-25

## 2020-02-24 RX ORDER — TRAMADOL HYDROCHLORIDE 50 MG/1
50 TABLET ORAL EVERY 6 HOURS
Refills: 0 | Status: DISCONTINUED | OUTPATIENT
Start: 2020-02-24 | End: 2020-02-25

## 2020-02-24 RX ORDER — ONDANSETRON 8 MG/1
4 TABLET, FILM COATED ORAL ONCE
Refills: 0 | Status: DISCONTINUED | OUTPATIENT
Start: 2020-02-24 | End: 2020-02-24

## 2020-02-24 RX ORDER — OXYCODONE HYDROCHLORIDE 5 MG/1
5 TABLET ORAL ONCE
Refills: 0 | Status: DISCONTINUED | OUTPATIENT
Start: 2020-02-24 | End: 2020-02-24

## 2020-02-24 RX ORDER — OXYCODONE AND ACETAMINOPHEN 5; 325 MG/1; MG/1
2 TABLET ORAL EVERY 4 HOURS
Refills: 0 | Status: DISCONTINUED | OUTPATIENT
Start: 2020-02-24 | End: 2020-02-24

## 2020-02-24 RX ORDER — BENZOCAINE AND MENTHOL 5; 1 G/100ML; G/100ML
1 LIQUID ORAL
Refills: 0 | Status: DISCONTINUED | OUTPATIENT
Start: 2020-02-24 | End: 2020-02-25

## 2020-02-24 RX ORDER — TRAMADOL HYDROCHLORIDE 50 MG/1
25 TABLET ORAL EVERY 4 HOURS
Refills: 0 | Status: DISCONTINUED | OUTPATIENT
Start: 2020-02-24 | End: 2020-02-25

## 2020-02-24 RX ORDER — FENTANYL CITRATE 50 UG/ML
50 INJECTION INTRAVENOUS
Refills: 0 | Status: DISCONTINUED | OUTPATIENT
Start: 2020-02-24 | End: 2020-02-24

## 2020-02-24 RX ORDER — DOCUSATE SODIUM 100 MG
1 CAPSULE ORAL
Qty: 15 | Refills: 0
Start: 2020-02-24 | End: 2020-02-28

## 2020-02-24 RX ORDER — ONDANSETRON 8 MG/1
4 TABLET, FILM COATED ORAL EVERY 6 HOURS
Refills: 0 | Status: DISCONTINUED | OUTPATIENT
Start: 2020-02-24 | End: 2020-02-25

## 2020-02-24 RX ORDER — OXYCODONE AND ACETAMINOPHEN 5; 325 MG/1; MG/1
1 TABLET ORAL EVERY 4 HOURS
Refills: 0 | Status: DISCONTINUED | OUTPATIENT
Start: 2020-02-24 | End: 2020-02-24

## 2020-02-24 RX ORDER — SODIUM CHLORIDE 9 MG/ML
1000 INJECTION, SOLUTION INTRAVENOUS
Refills: 0 | Status: DISCONTINUED | OUTPATIENT
Start: 2020-02-24 | End: 2020-02-24

## 2020-02-24 RX ORDER — KETOROLAC TROMETHAMINE 30 MG/ML
30 SYRINGE (ML) INJECTION EVERY 6 HOURS
Refills: 0 | Status: COMPLETED | OUTPATIENT
Start: 2020-02-24 | End: 2020-02-25

## 2020-02-24 RX ORDER — TRAMADOL HYDROCHLORIDE 50 MG/1
0.5 TABLET ORAL
Qty: 10 | Refills: 0
Start: 2020-02-24 | End: 2020-02-28

## 2020-02-24 RX ORDER — SODIUM CHLORIDE 9 MG/ML
1000 INJECTION, SOLUTION INTRAVENOUS
Refills: 0 | Status: DISCONTINUED | OUTPATIENT
Start: 2020-02-24 | End: 2020-02-25

## 2020-02-24 RX ORDER — BUPROPION HYDROCHLORIDE 150 MG/1
1 TABLET, EXTENDED RELEASE ORAL
Qty: 0 | Refills: 0 | DISCHARGE

## 2020-02-24 RX ADMIN — Medication 30 MILLIGRAM(S): at 23:55

## 2020-02-24 RX ADMIN — FENTANYL CITRATE 50 MICROGRAM(S): 50 INJECTION INTRAVENOUS at 10:37

## 2020-02-24 RX ADMIN — Medication 30 MILLIGRAM(S): at 10:44

## 2020-02-24 RX ADMIN — Medication 30 MILLIGRAM(S): at 18:00

## 2020-02-24 RX ADMIN — SODIUM CHLORIDE 75 MILLILITER(S): 9 INJECTION, SOLUTION INTRAVENOUS at 10:37

## 2020-02-24 RX ADMIN — BENZOCAINE AND MENTHOL 1 LOZENGE: 5; 1 LIQUID ORAL at 21:27

## 2020-02-24 RX ADMIN — SODIUM CHLORIDE 75 MILLILITER(S): 9 INJECTION, SOLUTION INTRAVENOUS at 12:35

## 2020-02-24 RX ADMIN — ONDANSETRON 4 MILLIGRAM(S): 8 TABLET, FILM COATED ORAL at 18:59

## 2020-02-24 RX ADMIN — Medication 30 MILLIGRAM(S): at 17:48

## 2020-02-24 RX ADMIN — BUPROPION HYDROCHLORIDE 150 MILLIGRAM(S): 150 TABLET, EXTENDED RELEASE ORAL at 21:26

## 2020-02-24 RX ADMIN — SODIUM CHLORIDE 125 MILLILITER(S): 9 INJECTION, SOLUTION INTRAVENOUS at 21:12

## 2020-02-24 RX ADMIN — Medication 30 MILLIGRAM(S): at 23:20

## 2020-02-24 RX ADMIN — ONDANSETRON 4 MILLIGRAM(S): 8 TABLET, FILM COATED ORAL at 12:48

## 2020-02-24 RX ADMIN — FENTANYL CITRATE 50 MICROGRAM(S): 50 INJECTION INTRAVENOUS at 10:39

## 2020-02-24 NOTE — DISCHARGE NOTE PROVIDER - NSDCMRMEDTOKEN_GEN_ALL_CORE_FT
tamoxifen 20 mg oral tablet: 1 tab(s) orally once a day  Wellbutrin  mg/24 hours oral tablet, extended release: 1 tab(s) orally every 24 hours Colace 100 mg oral capsule: 1 cap(s) orally 3 times a day, As Needed -for constipation   naproxen 500 mg oral tablet: 1 tab(s) orally 2 times a day  oxycodone-acetaminophen 5 mg-325 mg oral tablet: 1 tab(s) orally every 6 hours, As Needed - severe pain MDD:4 tabs  tamoxifen 20 mg oral tablet: 1 tab(s) orally once a day  Wellbutrin  mg/24 hours oral tablet, extended release: 1 tab(s) orally every 24 hours Colace 100 mg oral capsule: 1 cap(s) orally 3 times a day, As Needed -for constipation   naproxen 500 mg oral tablet: 1 tab(s) orally 2 times a day  tamoxifen 20 mg oral tablet: 1 tab(s) orally once a day  traMADol 50 mg oral tablet: 0.5 tab(s) orally every 6 hours, As Needed -for severe pain MDD:2 tabs

## 2020-02-24 NOTE — DISCHARGE NOTE PROVIDER - HOSPITAL COURSE
Patient post-operatively had an uncomplicated hospital course. Her pain was well controlled. She is tolerating a regular diet. She is ambulating independently. She was able to void after removal of baldwin. Patient with flatus. Labs and Vitals WNL upon discharge.

## 2020-02-24 NOTE — PROGRESS NOTE ADULT - ASSESSMENT
Pt is a 50yo F s/p RA [facility] BSO Cysto for BRCA 1 mutation  Admission day 1  Postop day 0  Pt was seen and examined at bedside for Postoperative check.  Pain well controlled, Adequate urine output  Pt is comfortable NAD. A&Ox3    Plan:   Encourage ambulation  Encourage advancing diet as tolerated  Will follow with AM labs  Plan to discharge tomorrow after 23h observation.

## 2020-02-24 NOTE — ASU PATIENT PROFILE, ADULT - PSH
H/O breast augmentation  2004  History of cholecystectomy  1989  History of mastectomy, bilateral  with implants  History of other surgery  Liposuction  S/P breast reconstruction, bilateral  06/2019

## 2020-02-24 NOTE — DISCHARGE NOTE PROVIDER - NSDCCPCAREPLAN_GEN_ALL_CORE_FT
PRINCIPAL DISCHARGE DIAGNOSIS  Diagnosis: Genetic predisposition to cancer  Assessment and Plan of Treatment:

## 2020-02-24 NOTE — DISCHARGE NOTE PROVIDER - NSDCFUADDINST_GEN_ALL_CORE_FT
Please contact your provider for any pain uncontrolled by medication, excessive bleeding or Fever>100.4  Please take Naprosyn 1 tablet every 12 hours x 3 days, may take percocet as prescribed for breakthrough pain.   Please take Colace as needed while taking Percocet to avoid constipation. Please contact your provider for any pain uncontrolled by medication, excessive bleeding or Fever>100.4  Please take Tramadol1 tablet every 12 hours x 3 days, may take percocet as prescribed for breakthrough pain.   Please take Colace as needed while taking Tramadol to avoid constipation.

## 2020-02-24 NOTE — BRIEF OPERATIVE NOTE - NSICDXBRIEFPROCEDURE_GEN_ALL_CORE_FT
PROCEDURES:  Fixation, ligament, uterosacral 24-Feb-2020 09:33:44  Yumiko Ortega  BSO 24-Feb-2020 09:33:39  Yumiko Ortega  Robot-assisted laparoscopic hysterectomy with cystoscopy 24-Feb-2020 09:33:30  Yumiko Ortega

## 2020-02-24 NOTE — DISCHARGE NOTE PROVIDER - CARE PROVIDER_API CALL
Erick Cabrera)  [facility]  [address] [place] [address]  Phone: [phone]  Fax: [phone]  Follow Up Time:

## 2020-02-24 NOTE — DISCHARGE NOTE PROVIDER - REASON FOR ADMISSION
surgery - robotic assisted total laparoscopic hysterectomy, bilateral salpingo-oophorectomy, cystoscopy, uterosacral ligament fixation

## 2020-02-24 NOTE — ASU PATIENT PROFILE, ADULT - PMH
Anxiety    BRCA positive    Breast cancer in female  Right  Gastric polyp  History of  Hyperlipidemia, unspecified hyperlipidemia type

## 2020-02-24 NOTE — PROGRESS NOTE ADULT - SUBJECTIVE AND OBJECTIVE BOX
Pt is a 50yo F s/p RA H BSO Cysto for BRCA 1 mutation    Pt was seen and examined at bedside for Postoperative rounds.  Pt feels nauseated, just received a dose of IV Zofran. She ambulated to the bathroom, 100UO;   Pain is well controlled with current pain regimen.    Vital Signs Last 24 Hrs  T(C): 36.6 (24 Feb 2020 12:31), Max: 36.8 (24 Feb 2020 06:57)  T(F): 97.8 (24 Feb 2020 12:31), Max: 98.3 (24 Feb 2020 06:57)  HR: 85 (24 Feb 2020 12:31) (75 - 90)  BP: 110/56 (24 Feb 2020 12:31) (110/56 - 145/64)  RR: 16 (24 Feb 2020 12:31) (12 - 20)  SpO2: 100% (24 Feb 2020 12:31) (100% - 100%)  Heart: RRR no murmurs, rales or rhonchi  Lungs: CTA bilaterally  Abdomen: soft, nontender, nondistended. BS present.  LE: nontender, no edema, negative Russ sign bilaterally

## 2020-02-24 NOTE — DISCHARGE NOTE PROVIDER - CARE PROVIDERS DIRECT ADDRESSES
[email or web address]

## 2020-02-24 NOTE — BRIEF OPERATIVE NOTE - OPERATION/FINDINGS
normal uterus, tubes, ovaries, upper abdomen, appendix, pelvis, no adhesions normal uterus, tubes, ovaries, upper abdomen, appendix, pelvis, no adhesions. Cysto - normal bladder mucosa and bilateral UO jets.

## 2020-02-24 NOTE — ASU PATIENT PROFILE, ADULT - TEACHING/LEARNING LEARNING PREFERENCES
audio/computer/internet/skill demonstration/verbal instruction/pictorial/written material/video/group instruction/individual instruction

## 2020-02-25 ENCOUNTER — TRANSCRIPTION ENCOUNTER (OUTPATIENT)
Age: 50
End: 2020-02-25

## 2020-02-25 VITALS
HEART RATE: 103 BPM | OXYGEN SATURATION: 98 % | SYSTOLIC BLOOD PRESSURE: 131 MMHG | TEMPERATURE: 98 F | RESPIRATION RATE: 18 BRPM | DIASTOLIC BLOOD PRESSURE: 49 MMHG

## 2020-02-25 LAB
ANION GAP SERPL CALC-SCNC: 4 MMOL/L — LOW (ref 5–17)
BASOPHILS # BLD AUTO: 0.04 K/UL — SIGNIFICANT CHANGE UP (ref 0–0.2)
BASOPHILS NFR BLD AUTO: 0.4 % — SIGNIFICANT CHANGE UP (ref 0–2)
BUN SERPL-MCNC: 8 MG/DL — SIGNIFICANT CHANGE UP (ref 7–23)
CALCIUM SERPL-MCNC: 8.3 MG/DL — LOW (ref 8.5–10.1)
CHLORIDE SERPL-SCNC: 114 MMOL/L — HIGH (ref 96–108)
CO2 SERPL-SCNC: 27 MMOL/L — SIGNIFICANT CHANGE UP (ref 22–31)
CREAT SERPL-MCNC: 0.75 MG/DL — SIGNIFICANT CHANGE UP (ref 0.5–1.3)
EOSINOPHIL # BLD AUTO: 0.02 K/UL — SIGNIFICANT CHANGE UP (ref 0–0.5)
EOSINOPHIL NFR BLD AUTO: 0.2 % — SIGNIFICANT CHANGE UP (ref 0–6)
GLUCOSE SERPL-MCNC: 101 MG/DL — HIGH (ref 70–99)
HCT VFR BLD CALC: 34.6 % — SIGNIFICANT CHANGE UP (ref 34.5–45)
HGB BLD-MCNC: 11.7 G/DL — SIGNIFICANT CHANGE UP (ref 11.5–15.5)
IMM GRANULOCYTES NFR BLD AUTO: 0.4 % — SIGNIFICANT CHANGE UP (ref 0–1.5)
LYMPHOCYTES # BLD AUTO: 1.86 K/UL — SIGNIFICANT CHANGE UP (ref 1–3.3)
LYMPHOCYTES # BLD AUTO: 18.8 % — SIGNIFICANT CHANGE UP (ref 13–44)
MAGNESIUM SERPL-MCNC: 1.8 MG/DL — SIGNIFICANT CHANGE UP (ref 1.6–2.6)
MCHC RBC-ENTMCNC: 32.2 PG — SIGNIFICANT CHANGE UP (ref 27–34)
MCHC RBC-ENTMCNC: 33.8 GM/DL — SIGNIFICANT CHANGE UP (ref 32–36)
MCV RBC AUTO: 95.3 FL — SIGNIFICANT CHANGE UP (ref 80–100)
MONOCYTES # BLD AUTO: 0.86 K/UL — SIGNIFICANT CHANGE UP (ref 0–0.9)
MONOCYTES NFR BLD AUTO: 8.7 % — SIGNIFICANT CHANGE UP (ref 2–14)
NEUTROPHILS # BLD AUTO: 7.06 K/UL — SIGNIFICANT CHANGE UP (ref 1.8–7.4)
NEUTROPHILS NFR BLD AUTO: 71.5 % — SIGNIFICANT CHANGE UP (ref 43–77)
PLATELET # BLD AUTO: 191 K/UL — SIGNIFICANT CHANGE UP (ref 150–400)
POTASSIUM SERPL-MCNC: 3.8 MMOL/L — SIGNIFICANT CHANGE UP (ref 3.5–5.3)
POTASSIUM SERPL-SCNC: 3.8 MMOL/L — SIGNIFICANT CHANGE UP (ref 3.5–5.3)
RBC # BLD: 3.63 M/UL — LOW (ref 3.8–5.2)
RBC # FLD: 12.6 % — SIGNIFICANT CHANGE UP (ref 10.3–14.5)
SODIUM SERPL-SCNC: 145 MMOL/L — SIGNIFICANT CHANGE UP (ref 135–145)
WBC # BLD: 9.88 K/UL — SIGNIFICANT CHANGE UP (ref 3.8–10.5)
WBC # FLD AUTO: 9.88 K/UL — SIGNIFICANT CHANGE UP (ref 3.8–10.5)

## 2020-02-25 RX ORDER — BUPROPION HYDROCHLORIDE 150 MG/1
150 TABLET, FILM COATED ORAL
Refills: 0 | Status: DISCONTINUED | COMMUNITY
Start: 2020-02-03 | End: 2020-02-25

## 2020-02-25 RX ADMIN — Medication 30 MILLIGRAM(S): at 06:33

## 2020-02-25 RX ADMIN — Medication 30 MILLIGRAM(S): at 06:12

## 2020-02-25 RX ADMIN — Medication 30 MILLIGRAM(S): at 12:04

## 2020-02-25 RX ADMIN — TRAMADOL HYDROCHLORIDE 50 MILLIGRAM(S): 50 TABLET ORAL at 13:41

## 2020-02-25 NOTE — DISCHARGE NOTE NURSING/CASE MANAGEMENT/SOCIAL WORK - PATIENT PORTAL LINK FT
You can access the FollowMyHealth Patient Portal offered by [facility] by registering at the following website: [email or web address]. By joining [facility]’s FollowMyHealth portal, you will also be able to view your health information using other applications (apps) compatible with our system.

## 2020-02-25 NOTE — PROGRESS NOTE ADULT - ASSESSMENT
49y s/p robot-assisted total laparoscopic hysterectomy and bilateral salpingooophroectomy, and cystoscopy, POD #1, HD #2.     Pulmonary: no active disease, incentive spirometer at bedside.   Neuro: pain well controlled with current regimen.   GI: tolerating regular diet   : voiding spontaneously, urine output adequate   Heme: SCDs when not ambulating. CBC this AM pending colleciton.   FEN: IVF at 125cc/hr, will discontinue once PO intake is adequate. BMP this AM  Dispo: Likely DC today pending Attending's approval

## 2020-02-25 NOTE — PROGRESS NOTE ADULT - SUBJECTIVE AND OBJECTIVE BOX
Name: TRISTAN CANTU  MRN: [id number]  Date Admitted: 02-24-20 (1d)  Location: [place] [address]    Gynecology-Oncology Progress Note    TRISTAN CANTU is a 49y s/p robot-assisted total laparoscopic hysterectomy and bilateral salpingooophroectomy, and cystoscopy, POD #1, HD #2.     SUBJECTIVE:  Pt seen and examined at bedside.   Patient is without complaints.  Pain well-controlled.  Tolerating PO intake without N/V. Endorses flatus and BM.  OOB and ambulating without difficulty or dyspnea.  Denies HA, dizziness, fevers, chills, CP, or SOB.    OBJECTIVE:   T(F): 98.1 (02-25-20 @ 05:12), Max: 99.2 (02-24-20 @ 20:21)  HR: 94 (02-25-20 @ 05:12) (75 - 109)  BP: 121/65 (02-25-20 @ 05:12) (110/56 - 145/64)  RR: 18 (02-25-20 @ 05:12) (12 - 20)  SpO2: 99% (02-25-20 @ 05:12) (95% - 100%)  Wt(kg): --      02-24-20 @ 07:01  -  02-25-20 @ 06:40  --------------------------------------------------------  IN:    lactated ringers.: 500 mL    lactated ringers.: 1000 mL    Other: 2500 mL  Total IN: 4000 mL    OUT:    Other: 80 mL    Voided: 800 mL  Total OUT: 880 mL    Total NET: 3120 mL          Physical Exam:  Constitutional: NAD, AOx3  Pulmonary: clear to auscultation bilaterally, IS to __.  Cardiovascular: Regular rate and rhythm   Abdomen: soft, non-tender, non-distended, normal bowel sounds  Extremities: no lower extremity edema or calve tenderness, Russ's sign negative. SCDs.  Incision: Clean, dry, intact.  Without signs of infection or hernia.    LABS:                        11.7   9.88  )-----------( 191      ( 25 Feb 2020 06:25 )             34.6             RADIOLOGY & ADDITIONAL TESTS:    HOSPITAL MEDS:  MEDICATIONS  (STANDING):  buPROPion XL . 150 milliGRAM(s) Oral <User Schedule>  ketorolac   Injectable 30 milliGRAM(s) IV Push every 6 hours  lactated ringers. 1000 milliLiter(s) (125 mL/Hr) IV Continuous <Continuous>    MEDICATIONS  (PRN):  benzocaine 15 mG/menthol 3.6 mG (Sugar-Free) Lozenge 1 Lozenge Oral every 2 hours PRN Sore Throat  ondansetron Injectable 4 milliGRAM(s) IV Push every 6 hours PRN Postoperative Nausea and/or Vomiting  traMADol 25 milliGRAM(s) Oral every 4 hours PRN Moderate Pain (4 - 6)  traMADol 50 milliGRAM(s) Oral every 6 hours PRN Severe Pain (7 - 10) Name: TRISTAN CANTU  MRN: [id number]  Date Admitted: 02-24-20 (1d)  Location: [place] [address]    Gynecology-Oncology Progress Note    TRISTAN CANTU is a 49y s/p robot-assisted total laparoscopic hysterectomy and bilateral salpingooophroectomy, and cystoscopy, POD #1, HD #2.     SUBJECTIVE:  Pt seen and examined at bedside.   Patient is without complaints.  Pain well-controlled.  Tolerating PO intake without N/V. Denies flatus or BM.  OOB and ambulating without difficulty or dyspnea.  Denies HA, dizziness, fevers, chills, CP, or SOB.    OBJECTIVE:   T(F): 98.1 (02-25-20 @ 05:12), Max: 99.2 (02-24-20 @ 20:21)  HR: 94 (02-25-20 @ 05:12) (75 - 109)  BP: 121/65 (02-25-20 @ 05:12) (110/56 - 145/64)  RR: 18 (02-25-20 @ 05:12) (12 - 20)  SpO2: 99% (02-25-20 @ 05:12) (95% - 100%)    02-24-20 @ 07:01  -  02-25-20 @ 06:40  --------------------------------------------------------  IN:    lactated ringers.: 500 mL    lactated ringers.: 1000 mL    Other: 2500 mL  Total IN: 4000 mL    OUT:    Other: 80 mL    Voided: 800 mL  Total OUT: 880 mL    Total NET: 3120 mL    Physical Exam:  Constitutional: NAD, AOx3  Pulmonary: clear to auscultation bilaterally  Cardiovascular: Regular rate and rhythm   Abdomen: soft, non-tender, non-distended, normal bowel sounds, no rebounding or guarding  Extremities: no lower extremity edema or calve tenderness, Russ's sign negative. SCDs.  Incision: Clean, dry, intact.  Without signs of infection or hernia.    LABS:                        11.7   9.88  )-----------( 191      ( 25 Feb 2020 06:25 )             34.6     HOSPITAL MEDS:  MEDICATIONS  (STANDING):  buPROPion XL . 150 milliGRAM(s) Oral <User Schedule>  ketorolac   Injectable 30 milliGRAM(s) IV Push every 6 hours  lactated ringers. 1000 milliLiter(s) (125 mL/Hr) IV Continuous <Continuous>    MEDICATIONS  (PRN):  benzocaine 15 mG/menthol 3.6 mG (Sugar-Free) Lozenge 1 Lozenge Oral every 2 hours PRN Sore Throat  ondansetron Injectable 4 milliGRAM(s) IV Push every 6 hours PRN Postoperative Nausea and/or Vomiting  traMADol 25 milliGRAM(s) Oral every 4 hours PRN Moderate Pain (4 - 6)  traMADol 50 milliGRAM(s) Oral every 6 hours PRN Severe Pain (7 - 10)

## 2020-02-27 PROBLEM — C50.919 MALIGNANT NEOPLASM OF UNSPECIFIED SITE OF UNSPECIFIED FEMALE BREAST: Chronic | Status: ACTIVE | Noted: 2018-12-11

## 2020-03-02 DIAGNOSIS — E78.5 HYPERLIPIDEMIA, UNSPECIFIED: ICD-10-CM

## 2020-03-02 DIAGNOSIS — Z90.49 ACQUIRED ABSENCE OF OTHER SPECIFIED PARTS OF DIGESTIVE TRACT: ICD-10-CM

## 2020-03-02 DIAGNOSIS — Z79.1 LONG TERM (CURRENT) USE OF NON-STEROIDAL ANTI-INFLAMMATORIES (NSAID): ICD-10-CM

## 2020-03-02 DIAGNOSIS — F41.9 ANXIETY DISORDER, UNSPECIFIED: ICD-10-CM

## 2020-03-02 DIAGNOSIS — Z15.01 GENETIC SUSCEPTIBILITY TO MALIGNANT NEOPLASM OF BREAST: ICD-10-CM

## 2020-03-02 DIAGNOSIS — N83.8 OTHER NONINFLAMMATORY DISORDERS OF OVARY, FALLOPIAN TUBE AND BROAD LIGAMENT: ICD-10-CM

## 2020-03-02 DIAGNOSIS — Z85.3 PERSONAL HISTORY OF MALIGNANT NEOPLASM OF BREAST: ICD-10-CM

## 2020-03-02 DIAGNOSIS — N83.202 UNSPECIFIED OVARIAN CYST, LEFT SIDE: ICD-10-CM

## 2020-03-02 DIAGNOSIS — Z87.891 PERSONAL HISTORY OF NICOTINE DEPENDENCE: ICD-10-CM

## 2020-03-02 DIAGNOSIS — D25.9 LEIOMYOMA OF UTERUS, UNSPECIFIED: ICD-10-CM

## 2020-03-02 DIAGNOSIS — N72 INFLAMMATORY DISEASE OF CERVIX UTERI: ICD-10-CM

## 2020-03-02 DIAGNOSIS — F32.9 MAJOR DEPRESSIVE DISORDER, SINGLE EPISODE, UNSPECIFIED: ICD-10-CM

## 2020-03-02 DIAGNOSIS — N88.8 OTHER SPECIFIED NONINFLAMMATORY DISORDERS OF CERVIX UTERI: ICD-10-CM

## 2020-03-02 DIAGNOSIS — Z90.13 ACQUIRED ABSENCE OF BILATERAL BREASTS AND NIPPLES: ICD-10-CM

## 2020-03-02 DIAGNOSIS — N83.201 UNSPECIFIED OVARIAN CYST, RIGHT SIDE: ICD-10-CM

## 2020-03-17 ENCOUNTER — APPOINTMENT (OUTPATIENT)
Dept: GYNECOLOGIC ONCOLOGY | Facility: CLINIC | Age: 50
End: 2020-03-17
Payer: MEDICAID

## 2020-03-17 VITALS
BODY MASS INDEX: 20.91 KG/M2 | DIASTOLIC BLOOD PRESSURE: 82 MMHG | OXYGEN SATURATION: 100 % | HEIGHT: 63 IN | RESPIRATION RATE: 16 BRPM | SYSTOLIC BLOOD PRESSURE: 125 MMHG | WEIGHT: 118 LBS | HEART RATE: 88 BPM

## 2020-03-17 PROCEDURE — 99024 POSTOP FOLLOW-UP VISIT: CPT

## 2020-03-17 NOTE — ASSESSMENT
[FreeTextEntry1] : PT is a 50 yo with BRCA1 mutation s/p TRH, BSO with pathology showing no evidence of malignancy.

## 2020-03-26 ENCOUNTER — OUTPATIENT (OUTPATIENT)
Dept: OUTPATIENT SERVICES | Facility: HOSPITAL | Age: 50
LOS: 1 days | Discharge: HOME | End: 2020-03-26

## 2020-03-26 DIAGNOSIS — Z98.890 OTHER SPECIFIED POSTPROCEDURAL STATES: Chronic | ICD-10-CM

## 2020-03-26 DIAGNOSIS — C50.919 MALIGNANT NEOPLASM OF UNSPECIFIED SITE OF UNSPECIFIED FEMALE BREAST: ICD-10-CM

## 2020-03-26 DIAGNOSIS — Z98.82 BREAST IMPLANT STATUS: Chronic | ICD-10-CM

## 2020-03-26 DIAGNOSIS — Z90.13 ACQUIRED ABSENCE OF BILATERAL BREASTS AND NIPPLES: Chronic | ICD-10-CM

## 2020-03-26 DIAGNOSIS — Z90.49 ACQUIRED ABSENCE OF OTHER SPECIFIED PARTS OF DIGESTIVE TRACT: Chronic | ICD-10-CM

## 2020-04-14 ENCOUNTER — APPOINTMENT (OUTPATIENT)
Dept: GYNECOLOGIC ONCOLOGY | Facility: CLINIC | Age: 50
End: 2020-04-14
Payer: MEDICAID

## 2020-04-14 VITALS
WEIGHT: 118 LBS | DIASTOLIC BLOOD PRESSURE: 78 MMHG | OXYGEN SATURATION: 100 % | HEART RATE: 92 BPM | TEMPERATURE: 98.1 F | HEIGHT: 63 IN | RESPIRATION RATE: 16 BRPM | SYSTOLIC BLOOD PRESSURE: 113 MMHG | BODY MASS INDEX: 20.91 KG/M2

## 2020-04-14 PROCEDURE — 99024 POSTOP FOLLOW-UP VISIT: CPT

## 2020-04-14 NOTE — DISCUSSION/SUMMARY
[Clean] : was clean [Dry] : was dry [Intact] : was intact [Erythema] : was not erythematous [Ecchymosis] : was not ecchymotic [Seroma] : had no seroma [None] : had no drainage [Normal Skin] : normal appearance [Firm] : soft [Tender] : nontender [Abnormal Bowel Sounds] : normal bowel sounds [Rebound] : no rebound tenderness [Guarding] : no guarding [Incisional Hernia] : no incisional hernia [Mass] : no palpable mass [External Genitalia Abnormal] : normal external genitalia [Vaginal Exam Abnormal] : normal vaginal exam [Doing Well] : is doing well [Excellent Pain Control] : has excellent pain control [No Sign of Infection] : is showing no signs of infection [0] : 0 [de-identified] : well healed vaginal cuff

## 2020-04-14 NOTE — ASSESSMENT
[FreeTextEntry1] : PT is a 50 yo with BRCA1 mutation s/p TRH, BSO with pathology showing no evidence of malignancy. Recovered well.

## 2020-04-14 NOTE — REASON FOR VISIT
[Post Op] : post op visit [de-identified] : 2/24/20 [de-identified] : TRH, BSO [de-identified] : Pt presents for second post-op check. She reports doing well, but would love to go back to the gym. She has no nausea/vomiting, no fevers/chills.

## 2020-05-27 ENCOUNTER — OUTPATIENT (OUTPATIENT)
Dept: OUTPATIENT SERVICES | Facility: HOSPITAL | Age: 50
LOS: 1 days | Discharge: HOME | End: 2020-05-27

## 2020-05-27 DIAGNOSIS — Z98.82 BREAST IMPLANT STATUS: Chronic | ICD-10-CM

## 2020-05-27 DIAGNOSIS — Z90.13 ACQUIRED ABSENCE OF BILATERAL BREASTS AND NIPPLES: Chronic | ICD-10-CM

## 2020-05-27 DIAGNOSIS — C50.919 MALIGNANT NEOPLASM OF UNSPECIFIED SITE OF UNSPECIFIED FEMALE BREAST: ICD-10-CM

## 2020-05-27 DIAGNOSIS — Z90.49 ACQUIRED ABSENCE OF OTHER SPECIFIED PARTS OF DIGESTIVE TRACT: Chronic | ICD-10-CM

## 2020-05-27 DIAGNOSIS — Z98.890 OTHER SPECIFIED POSTPROCEDURAL STATES: Chronic | ICD-10-CM

## 2020-06-01 ENCOUNTER — APPOINTMENT (OUTPATIENT)
Dept: HEMATOLOGY ONCOLOGY | Facility: CLINIC | Age: 50
End: 2020-06-01
Payer: MEDICAID

## 2020-06-01 DIAGNOSIS — R23.2 FLUSHING: ICD-10-CM

## 2020-06-01 DIAGNOSIS — T45.1X5A FLUSHING: ICD-10-CM

## 2020-06-01 PROCEDURE — 99442: CPT

## 2020-06-01 NOTE — ASSESSMENT
[FreeTextEntry1] : 49 year old female, BRCA1 positive, with stage IA poorly differentiated IDC of right breast (T1cN0,  ER 90% ME 50%, HER-2 negative) s/p bilateral mastectomy on 12/19/18.  Oncotype Dx 20 which is intermediate risk. Based on [facility] Rx / Oncotype Dx 20, her absolute benefit from chemotherapy is 1.6% and the risks likely outweigh the benefits.  Tamoxifen instead risk of distance recurrence at 9 years is 6%.   She started Tamoxifen on 2/3/30.\par S/p ANIVAL/BSO on 2/24/20.\par \par Significant hot flashes due to Tamoxifen, about 10 episodes per day. Also feels "foggy".\par Discussed switch to AI now that she is in menopause s/p BSO.  She is hesitant to switch. \par \par Plan:\par -Continue Tamoxifen for now\par -Increase Effexor XR to 75 mg daily\par -leukopenia - ANC 1400, no B symptoms, continue to monitor\par -F/u in 3 months\par \par Total time on phone: 11 minutes

## 2020-06-01 NOTE — REVIEW OF SYSTEMS
[Anxiety] : anxiety [Negative] : Constitutional [FreeTextEntry2] : negative except as reviewed in interval history

## 2020-06-01 NOTE — HISTORY OF PRESENT ILLNESS
[Disease: _____________________] : Disease: [unfilled] [T: ___] : T[unfilled] [N: ___] : N[unfilled] [M: ___] : M[unfilled] [AJCC Stage: ____] : AJCC Stage: [unfilled] [de-identified] : Ms. Hardin was diagnosed with right breast IDC at the age of 48 in December 2018. Given positive family history, she tested positive for a BRCA1 mutation in 9/2018.\par The patient underwent screening imaging on 10/1/18 which only showed minimal bilateral fibrocystic nodularity but was otherwise a negative bilateral breast US. She then underwent an MRI on 10/26/18 which demonstrated a right upper inner quadrant irregular enhancing nodule and a second US with US biopsy was advised. \par \par An US guided biopsy was performed on 11/8/18 and pathology revealed infiltrative ductal carcinoma with no definitive lymphovascular invasion and no in situ component identified. Boone score 7/9, ER 90% DC 50%, HER-2 negative. \par \par She is s/p bilateral mastectomy and sentinel lymph node biopsy on 12/19/18 with Dr. Vidales. \par Pathology:\par Right breast - poorly differentiated infiltrative ductal carcinoma,  [place] score of 8/9, 1.7 cm.   There was no definitive lymphovascular invasion or in situ component identified. The tumor showed brisk lymphocytic response and margins were negative. The skeletal muscle was negative for malignancy. The 3 lymph nodes were negative for tumor.  pT1c N0\par Left breast - negative for malignancy but had focal usual ductal hyperplasia and nodular dense fibrosis. \par \par Her Oncotype score is 20 and she is BRCA1 positive [id number] pathogenic mutation.\par \par She is otherwise healthy and denies any other medical problems. Menstrual period has been irregular, with LMP in December 2018. \par \par FMHx: breast cancer in paternal aunt and grandmother\par Social Hx: 8 year smoking history but quit 22 years ago, occasional drinker, 2 children and 1 stepchild (all girls, ages 17-22),  [de-identified] : poorly differentiated IDC [de-identified] :  ER 90% RI 50%, HER-2 negative [de-identified] : Verbal consent given by patient to conduct this telephonic visit. She is unable to do video conferencing. \par \par She started Tamoxife on 2/3/20. \par S/p ANIVAL / BSO on 2/24/20.\par Initially felt "foggy" and nauseous.  Taking Tamoxifen was a bit better than the mornings. \par She is on Venlafaxine. \par She is having hot flashes - 10 episodes per day. \par She has stopped the Estroven.\par \par \par

## 2020-07-01 DIAGNOSIS — D72.819 DECREASED WHITE BLOOD CELL COUNT, UNSPECIFIED: ICD-10-CM

## 2020-07-02 ENCOUNTER — RESULT REVIEW (OUTPATIENT)
Age: 50
End: 2020-07-02

## 2020-07-02 ENCOUNTER — APPOINTMENT (OUTPATIENT)
Dept: HEMATOLOGY ONCOLOGY | Facility: CLINIC | Age: 50
End: 2020-07-02

## 2020-07-02 ENCOUNTER — OUTPATIENT (OUTPATIENT)
Dept: OUTPATIENT SERVICES | Facility: HOSPITAL | Age: 50
LOS: 1 days | Discharge: HOME | End: 2020-07-02

## 2020-07-02 DIAGNOSIS — Z90.13 ACQUIRED ABSENCE OF BILATERAL BREASTS AND NIPPLES: Chronic | ICD-10-CM

## 2020-07-02 DIAGNOSIS — Z90.49 ACQUIRED ABSENCE OF OTHER SPECIFIED PARTS OF DIGESTIVE TRACT: Chronic | ICD-10-CM

## 2020-07-02 DIAGNOSIS — Z98.890 OTHER SPECIFIED POSTPROCEDURAL STATES: Chronic | ICD-10-CM

## 2020-07-02 DIAGNOSIS — Z98.82 BREAST IMPLANT STATUS: Chronic | ICD-10-CM

## 2020-07-02 DIAGNOSIS — C50.919 MALIGNANT NEOPLASM OF UNSPECIFIED SITE OF UNSPECIFIED FEMALE BREAST: ICD-10-CM

## 2020-07-02 LAB
BASOPHILS # BLD AUTO: 0.1 K/UL — SIGNIFICANT CHANGE UP (ref 0–0.2)
BASOPHILS NFR BLD AUTO: 1.9 % — SIGNIFICANT CHANGE UP (ref 0–2)
EOSINOPHIL # BLD AUTO: 0.1 K/UL — SIGNIFICANT CHANGE UP (ref 0–0.5)
EOSINOPHIL NFR BLD AUTO: 3.2 % — SIGNIFICANT CHANGE UP (ref 0–6)
HCT VFR BLD CALC: 42.7 % — SIGNIFICANT CHANGE UP (ref 34.5–45)
HGB BLD-MCNC: 14.1 G/DL — SIGNIFICANT CHANGE UP (ref 11.5–15.5)
LYMPHOCYTES # BLD AUTO: 1.5 K/UL — SIGNIFICANT CHANGE UP (ref 1–3.3)
LYMPHOCYTES # BLD AUTO: 43.3 % — SIGNIFICANT CHANGE UP (ref 13–44)
MCHC RBC-ENTMCNC: 31.2 PG — SIGNIFICANT CHANGE UP (ref 27–34)
MCHC RBC-ENTMCNC: 33.1 G/DL — SIGNIFICANT CHANGE UP (ref 32–36)
MCV RBC AUTO: 94.1 FL — SIGNIFICANT CHANGE UP (ref 80–100)
MONOCYTES # BLD AUTO: 0.4 K/UL — SIGNIFICANT CHANGE UP (ref 0–0.9)
MONOCYTES NFR BLD AUTO: 12.2 % — SIGNIFICANT CHANGE UP (ref 2–14)
NEUTROPHILS # BLD AUTO: 1.3 K/UL — LOW (ref 1.8–7.4)
NEUTROPHILS NFR BLD AUTO: 39.5 % — LOW (ref 43–77)
PLATELET # BLD AUTO: 243 K/UL — SIGNIFICANT CHANGE UP (ref 150–400)
RBC # BLD: 4.54 M/UL — SIGNIFICANT CHANGE UP (ref 3.8–5.2)
RBC # FLD: 11 % — SIGNIFICANT CHANGE UP (ref 10.3–14.5)
WBC # BLD: 3.4 K/UL — LOW (ref 3.8–10.5)
WBC # FLD AUTO: 3.4 K/UL — LOW (ref 3.8–10.5)

## 2020-07-06 LAB
ALBUMIN SERPL ELPH-MCNC: 4.7 G/DL
ALP BLD-CCNC: 65 U/L
ALT SERPL-CCNC: 14 U/L
ANION GAP SERPL CALC-SCNC: 12 MMOL/L
AST SERPL-CCNC: 16 U/L
BILIRUB SERPL-MCNC: <0.2 MG/DL
BUN SERPL-MCNC: 15 MG/DL
CALCIUM SERPL-MCNC: 9.4 MG/DL
CHLORIDE SERPL-SCNC: 105 MMOL/L
CO2 SERPL-SCNC: 24 MMOL/L
CREAT SERPL-MCNC: 0.79 MG/DL
GLUCOSE SERPL-MCNC: 92 MG/DL
POTASSIUM SERPL-SCNC: 4.3 MMOL/L
PROT SERPL-MCNC: 6.5 G/DL
SODIUM SERPL-SCNC: 141 MMOL/L

## 2020-07-20 ENCOUNTER — APPOINTMENT (OUTPATIENT)
Dept: SURGERY | Facility: CLINIC | Age: 50
End: 2020-07-20

## 2020-07-20 ENCOUNTER — APPOINTMENT (OUTPATIENT)
Dept: HEMATOLOGY ONCOLOGY | Facility: CLINIC | Age: 50
End: 2020-07-20

## 2020-07-24 RX ORDER — VENLAFAXINE HYDROCHLORIDE 75 MG/1
75 CAPSULE, EXTENDED RELEASE ORAL DAILY
Qty: 30 | Refills: 3 | Status: DISCONTINUED | COMMUNITY
Start: 2020-02-25 | End: 2020-07-24

## 2020-09-23 NOTE — DISCHARGE NOTE PROVIDER - NSDCFUSCHEDAPPT_GEN_ALL_CORE_FT
Report from CAMILA Moe. Care assumed.   TRISTAN CANTU ; 04/02/2020 ; ANDRÉS Jackson TRISTAN CANUT ; 04/02/2020 ; ANDRÉS Jackson

## 2020-10-30 ENCOUNTER — NON-APPOINTMENT (OUTPATIENT)
Age: 50
End: 2020-10-30

## 2020-11-01 ENCOUNTER — OUTPATIENT (OUTPATIENT)
Dept: OUTPATIENT SERVICES | Facility: HOSPITAL | Age: 50
LOS: 1 days | Discharge: HOME | End: 2020-11-01

## 2020-11-01 DIAGNOSIS — Z90.49 ACQUIRED ABSENCE OF OTHER SPECIFIED PARTS OF DIGESTIVE TRACT: Chronic | ICD-10-CM

## 2020-11-01 DIAGNOSIS — Z98.890 OTHER SPECIFIED POSTPROCEDURAL STATES: Chronic | ICD-10-CM

## 2020-11-01 DIAGNOSIS — Z90.13 ACQUIRED ABSENCE OF BILATERAL BREASTS AND NIPPLES: Chronic | ICD-10-CM

## 2020-11-01 DIAGNOSIS — Z98.82 BREAST IMPLANT STATUS: Chronic | ICD-10-CM

## 2020-11-01 DIAGNOSIS — C50.919 MALIGNANT NEOPLASM OF UNSPECIFIED SITE OF UNSPECIFIED FEMALE BREAST: ICD-10-CM

## 2020-11-09 ENCOUNTER — RESULT REVIEW (OUTPATIENT)
Age: 50
End: 2020-11-09

## 2020-11-09 ENCOUNTER — APPOINTMENT (OUTPATIENT)
Dept: HEMATOLOGY ONCOLOGY | Facility: CLINIC | Age: 50
End: 2020-11-09
Payer: MEDICAID

## 2020-11-09 VITALS
BODY MASS INDEX: 23.22 KG/M2 | HEART RATE: 89 BPM | OXYGEN SATURATION: 99 % | HEIGHT: 63 IN | SYSTOLIC BLOOD PRESSURE: 152 MMHG | DIASTOLIC BLOOD PRESSURE: 93 MMHG | WEIGHT: 131.05 LBS

## 2020-11-09 LAB
BASOPHILS # BLD AUTO: 0.07 K/UL — SIGNIFICANT CHANGE UP (ref 0–0.2)
BASOPHILS NFR BLD AUTO: 1.55 % — SIGNIFICANT CHANGE UP (ref 0–2)
EOSINOPHIL # BLD AUTO: 0.2 K/UL — SIGNIFICANT CHANGE UP (ref 0–0.5)
EOSINOPHIL NFR BLD AUTO: 4.51 % — SIGNIFICANT CHANGE UP (ref 0–6)
HCT VFR BLD CALC: 40.2 % — SIGNIFICANT CHANGE UP (ref 34.5–45)
HGB BLD-MCNC: 13.1 G/DL — SIGNIFICANT CHANGE UP (ref 11.5–15.5)
LYMPHOCYTES # BLD AUTO: 1.86 K/UL — SIGNIFICANT CHANGE UP (ref 1–3.3)
LYMPHOCYTES # BLD AUTO: 42 % — SIGNIFICANT CHANGE UP (ref 13–44)
MCHC RBC-ENTMCNC: 31.7 PG — SIGNIFICANT CHANGE UP (ref 27–34)
MCHC RBC-ENTMCNC: 32.7 G/DL — SIGNIFICANT CHANGE UP (ref 32–36)
MCV RBC AUTO: 96.8 FL — SIGNIFICANT CHANGE UP (ref 80–100)
MONOCYTES # BLD AUTO: 0.45 K/UL — SIGNIFICANT CHANGE UP (ref 0–0.9)
MONOCYTES NFR BLD AUTO: 10.1 % — SIGNIFICANT CHANGE UP (ref 2–14)
NEUTROPHILS # BLD AUTO: 1.85 K/UL — SIGNIFICANT CHANGE UP (ref 1.8–7.4)
NEUTROPHILS NFR BLD AUTO: 41.8 % — LOW (ref 43–77)
PLATELET # BLD AUTO: 196 K/UL — SIGNIFICANT CHANGE UP (ref 150–400)
RBC # BLD: 4.15 M/UL — SIGNIFICANT CHANGE UP (ref 3.8–5.2)
RBC # FLD: 11.3 % — SIGNIFICANT CHANGE UP (ref 10.3–14.5)
WBC # BLD: 4.43 K/UL — SIGNIFICANT CHANGE UP (ref 3.8–10.5)
WBC # FLD AUTO: 4.43 K/UL — SIGNIFICANT CHANGE UP (ref 3.8–10.5)

## 2020-11-09 PROCEDURE — 99072 ADDL SUPL MATRL&STAF TM PHE: CPT

## 2020-11-09 PROCEDURE — 99214 OFFICE O/P EST MOD 30 MIN: CPT

## 2020-11-09 RX ORDER — SOY ISOFL/BLK COH/GR TEA/YERBA 56-40-130
TABLET ORAL
Refills: 0 | Status: DISCONTINUED | COMMUNITY
Start: 2020-02-03 | End: 2020-11-09

## 2020-11-09 NOTE — ASSESSMENT
[FreeTextEntry1] : 49 year old female, BRCA1 positive, with stage IA poorly differentiated IDC of right breast (T1cN0,  ER 90% ME 50%, HER-2 negative) s/p bilateral mastectomy on 12/19/18.  Oncotype Dx 20 which is intermediate risk. Based on Tailor Rx / Oncotype Dx 20, her absolute benefit from chemotherapy is 1.6% and the risks likely outweigh the benefits.  Tamoxifen instead risk of distance recurrence at 9 years is 6%.   She started Tamoxifen on 2/3/30.\par S/p ANIVAL/BSO on 2/24/20.\par \par Multiple side effects to Tamoxifen reported including continued hot flashes, feeling foggy, multiple joint pains, anxiety.  \par \par Plan:\par Discussed Tamoxifen holiday x 4 weeks.  \par If no improvement in symptoms, this is likely secondary to menopause s/p BSO, and at that point can consider AI (which can also cause joint pains) or continue Tamoxifen\par For Anxiety, will consider low dose SSRI and wean off Venlafaxine as it's not really helping with hot flashes\par Follow up in 1 month

## 2020-11-09 NOTE — PHYSICAL EXAM
[Normal] : grossly intact [de-identified] : supple [de-identified] : CTA b/l [de-identified] : S1/S2 [de-identified] : No edema [de-identified] : soft, NT, ND

## 2020-11-09 NOTE — HISTORY OF PRESENT ILLNESS
[Disease: _____________________] : Disease: [unfilled] [T: ___] : T[unfilled] [N: ___] : N[unfilled] [M: ___] : M[unfilled] [AJCC Stage: ____] : AJCC Stage: [unfilled] [de-identified] : Ms. Hardin was diagnosed with right breast IDC at the age of 48 in December 2018. Given positive family history, she tested positive for a BRCA1 mutation in 9/2018.\par The patient underwent screening imaging on 10/1/18 which only showed minimal bilateral fibrocystic nodularity but was otherwise a negative bilateral breast US. She then underwent an MRI on 10/26/18 which demonstrated a right upper inner quadrant irregular enhancing nodule and a second US with US biopsy was advised. \par \par An US guided biopsy was performed on 11/8/18 and pathology revealed infiltrative ductal carcinoma with no definitive lymphovascular invasion and no in situ component identified. Boone score 7/9, ER 90% [place] 50%, HER-2 negative. \par \par She is s/p bilateral mastectomy and sentinel lymph node biopsy on 12/19/18 with Dr. Vidales. \par Pathology:\par Right breast - poorly differentiated infiltrative ductal carcinoma,  Cusick score of 8/9, 1.7 cm.   There was no definitive lymphovascular invasion or in situ component identified. The tumor showed brisk lymphocytic response and margins were negative. The skeletal muscle was negative for malignancy. The 3 lymph nodes were negative for tumor.  pT1c N0\par Left breast - negative for malignancy but had focal usual ductal hyperplasia and nodular dense fibrosis. \par \par Her Oncotype score is 20 and she is BRCA1 positive [id number] pathogenic mutation.\par \par She is otherwise healthy and denies any other medical problems. Menstrual period has been irregular, with LMP in December 2018. \par \par FMHx: breast cancer in paternal aunt and grandmother\par Social Hx: 8 year smoking history but quit 22 years ago, occasional drinker, 2 children and 1 stepchild (all girls, ages 17-22),  [de-identified] : poorly differentiated IDC [de-identified] :  ER 90% [place] 50%, HER-2 negative [de-identified] : Returns for follow up.\par She started Tamoxifen on 2/3/20. \par S/p ANIVAL / BSO on 2/24/20.\par She feels achy, tired, foggy, anxious.\par She has joint pains in wrists, elbows and knees. \par Her hair is thinner. \par Hot flashes about 10 per day. \par She is on Venlafaxine. \par Also notes baseline anxiety which is now worse. \par \par \par \par \par

## 2020-11-10 LAB
ALBUMIN SERPL ELPH-MCNC: 4.3 G/DL
ALP BLD-CCNC: 66 U/L
ALT SERPL-CCNC: 18 U/L
ANION GAP SERPL CALC-SCNC: 13 MMOL/L
AST SERPL-CCNC: 13 U/L
BILIRUB SERPL-MCNC: 0.2 MG/DL
BUN SERPL-MCNC: 17 MG/DL
CALCIUM SERPL-MCNC: 9.4 MG/DL
CHLORIDE SERPL-SCNC: 102 MMOL/L
CO2 SERPL-SCNC: 24 MMOL/L
CREAT SERPL-MCNC: 0.71 MG/DL
GLUCOSE SERPL-MCNC: 95 MG/DL
POTASSIUM SERPL-SCNC: 4.5 MMOL/L
PROT SERPL-MCNC: 6.2 G/DL
SODIUM SERPL-SCNC: 138 MMOL/L

## 2020-12-18 ENCOUNTER — OUTPATIENT (OUTPATIENT)
Dept: OUTPATIENT SERVICES | Facility: HOSPITAL | Age: 50
LOS: 1 days | Discharge: HOME | End: 2020-12-18

## 2020-12-18 DIAGNOSIS — Z98.82 BREAST IMPLANT STATUS: Chronic | ICD-10-CM

## 2020-12-18 DIAGNOSIS — Z90.13 ACQUIRED ABSENCE OF BILATERAL BREASTS AND NIPPLES: Chronic | ICD-10-CM

## 2020-12-18 DIAGNOSIS — Z90.49 ACQUIRED ABSENCE OF OTHER SPECIFIED PARTS OF DIGESTIVE TRACT: Chronic | ICD-10-CM

## 2020-12-18 DIAGNOSIS — Z98.890 OTHER SPECIFIED POSTPROCEDURAL STATES: Chronic | ICD-10-CM

## 2020-12-18 DIAGNOSIS — C50.919 MALIGNANT NEOPLASM OF UNSPECIFIED SITE OF UNSPECIFIED FEMALE BREAST: ICD-10-CM

## 2020-12-21 NOTE — BRIEF OPERATIVE NOTE - BRIEF OP NOTE DRAINS
Patient called and left another message on RN call back line.    Marcelle Rosa RN, [facility]     None

## 2020-12-22 ENCOUNTER — APPOINTMENT (OUTPATIENT)
Dept: HEMATOLOGY ONCOLOGY | Facility: CLINIC | Age: 50
End: 2020-12-22

## 2021-01-12 ENCOUNTER — OUTPATIENT (OUTPATIENT)
Dept: OUTPATIENT SERVICES | Facility: HOSPITAL | Age: 51
LOS: 1 days | End: 2021-01-12
Payer: MEDICAID

## 2021-01-12 DIAGNOSIS — Z90.13 ACQUIRED ABSENCE OF BILATERAL BREASTS AND NIPPLES: Chronic | ICD-10-CM

## 2021-01-12 DIAGNOSIS — Z90.49 ACQUIRED ABSENCE OF OTHER SPECIFIED PARTS OF DIGESTIVE TRACT: Chronic | ICD-10-CM

## 2021-01-12 DIAGNOSIS — Z98.890 OTHER SPECIFIED POSTPROCEDURAL STATES: Chronic | ICD-10-CM

## 2021-01-12 DIAGNOSIS — Z98.82 BREAST IMPLANT STATUS: Chronic | ICD-10-CM

## 2021-01-12 DIAGNOSIS — Z90.710 ACQUIRED ABSENCE OF BOTH CERVIX AND UTERUS: Chronic | ICD-10-CM

## 2021-01-12 DIAGNOSIS — Z85.3 PERSONAL HISTORY OF MALIGNANT NEOPLASM OF BREAST: ICD-10-CM

## 2021-01-12 DIAGNOSIS — Z01.818 ENCOUNTER FOR OTHER PREPROCEDURAL EXAMINATION: ICD-10-CM

## 2021-01-12 LAB
ANION GAP SERPL CALC-SCNC: 3 MMOL/L — LOW (ref 5–17)
BASOPHILS # BLD AUTO: 0.03 K/UL — SIGNIFICANT CHANGE UP (ref 0–0.2)
BASOPHILS NFR BLD AUTO: 0.7 % — SIGNIFICANT CHANGE UP (ref 0–2)
BUN SERPL-MCNC: 15 MG/DL — SIGNIFICANT CHANGE UP (ref 7–23)
CALCIUM SERPL-MCNC: 9.7 MG/DL — SIGNIFICANT CHANGE UP (ref 8.5–10.1)
CHLORIDE SERPL-SCNC: 109 MMOL/L — HIGH (ref 96–108)
CO2 SERPL-SCNC: 30 MMOL/L — SIGNIFICANT CHANGE UP (ref 22–31)
CREAT SERPL-MCNC: 0.71 MG/DL — SIGNIFICANT CHANGE UP (ref 0.5–1.3)
EOSINOPHIL # BLD AUTO: 0.17 K/UL — SIGNIFICANT CHANGE UP (ref 0–0.5)
EOSINOPHIL NFR BLD AUTO: 4 % — SIGNIFICANT CHANGE UP (ref 0–6)
GLUCOSE SERPL-MCNC: 82 MG/DL — SIGNIFICANT CHANGE UP (ref 70–99)
HCT VFR BLD CALC: 41.2 % — SIGNIFICANT CHANGE UP (ref 34.5–45)
HGB BLD-MCNC: 13.9 G/DL — SIGNIFICANT CHANGE UP (ref 11.5–15.5)
IMM GRANULOCYTES NFR BLD AUTO: 0.5 % — SIGNIFICANT CHANGE UP (ref 0–1.5)
LYMPHOCYTES # BLD AUTO: 1.62 K/UL — SIGNIFICANT CHANGE UP (ref 1–3.3)
LYMPHOCYTES # BLD AUTO: 38 % — SIGNIFICANT CHANGE UP (ref 13–44)
MCHC RBC-ENTMCNC: 32 PG — SIGNIFICANT CHANGE UP (ref 27–34)
MCHC RBC-ENTMCNC: 33.7 GM/DL — SIGNIFICANT CHANGE UP (ref 32–36)
MCV RBC AUTO: 94.9 FL — SIGNIFICANT CHANGE UP (ref 80–100)
MONOCYTES # BLD AUTO: 0.44 K/UL — SIGNIFICANT CHANGE UP (ref 0–0.9)
MONOCYTES NFR BLD AUTO: 10.3 % — SIGNIFICANT CHANGE UP (ref 2–14)
MRSA PCR RESULT.: SIGNIFICANT CHANGE UP
NEUTROPHILS # BLD AUTO: 1.98 K/UL — SIGNIFICANT CHANGE UP (ref 1.8–7.4)
NEUTROPHILS NFR BLD AUTO: 46.5 % — SIGNIFICANT CHANGE UP (ref 43–77)
PLATELET # BLD AUTO: 259 K/UL — SIGNIFICANT CHANGE UP (ref 150–400)
POTASSIUM SERPL-MCNC: 4 MMOL/L — SIGNIFICANT CHANGE UP (ref 3.5–5.3)
POTASSIUM SERPL-SCNC: 4 MMOL/L — SIGNIFICANT CHANGE UP (ref 3.5–5.3)
RBC # BLD: 4.34 M/UL — SIGNIFICANT CHANGE UP (ref 3.8–5.2)
RBC # FLD: 12.3 % — SIGNIFICANT CHANGE UP (ref 10.3–14.5)
S AUREUS DNA NOSE QL NAA+PROBE: SIGNIFICANT CHANGE UP
SODIUM SERPL-SCNC: 142 MMOL/L — SIGNIFICANT CHANGE UP (ref 135–145)
WBC # BLD: 4.26 K/UL — SIGNIFICANT CHANGE UP (ref 3.8–10.5)
WBC # FLD AUTO: 4.26 K/UL — SIGNIFICANT CHANGE UP (ref 3.8–10.5)

## 2021-01-12 PROCEDURE — 80048 BASIC METABOLIC PNL TOTAL CA: CPT

## 2021-01-12 PROCEDURE — 85025 COMPLETE CBC W/AUTO DIFF WBC: CPT

## 2021-01-12 PROCEDURE — 36415 COLL VENOUS BLD VENIPUNCTURE: CPT

## 2021-01-12 PROCEDURE — 87641 MR-STAPH DNA AMP PROBE: CPT

## 2021-01-12 PROCEDURE — 87640 STAPH A DNA AMP PROBE: CPT

## 2021-01-12 RX ORDER — TAMOXIFEN CITRATE 20 MG/1
1 TABLET, FILM COATED ORAL
Qty: 0 | Refills: 0 | DISCHARGE

## 2021-01-12 RX ORDER — VENLAFAXINE HCL 75 MG
1 CAPSULE, EXT RELEASE 24 HR ORAL
Qty: 0 | Refills: 0 | DISCHARGE

## 2021-01-12 NOTE — CHART NOTE - NSCHARTNOTEFT_GEN_A_CORE
Vital Signs: Height 5' 3", Weight 59.1 Kg, B/P 140/89, HR 92, Resp 18, Temp 97.9F, O2 sat 100% on room air    Patient instructed on     1. To follow instructions as per ASU for NPO status   2. On the use of EZ sponges  3. Mupirocin use (first does given in PST)  4. Aware she has COVID 19 testing tomorrow in [place]

## 2021-01-12 NOTE — ASU PATIENT PROFILE, ADULT - PSH
H/O breast augmentation  2004  History of cholecystectomy  1989  History of mastectomy, bilateral  with implants  12/20/2018  History of other surgery  Liposuction  S/P breast reconstruction, bilateral  06/2019  S/P ANIVAL-BSO  2/2020

## 2021-01-13 ENCOUNTER — APPOINTMENT (OUTPATIENT)
Dept: DISASTER EMERGENCY | Facility: CLINIC | Age: 51
End: 2021-01-13

## 2021-01-13 DIAGNOSIS — Z01.818 ENCOUNTER FOR OTHER PREPROCEDURAL EXAMINATION: ICD-10-CM

## 2021-01-13 DIAGNOSIS — Z85.3 PERSONAL HISTORY OF MALIGNANT NEOPLASM OF BREAST: ICD-10-CM

## 2021-01-14 LAB — SARS-COV-2 N GENE NPH QL NAA+PROBE: NOT DETECTED

## 2021-01-15 ENCOUNTER — RESULT REVIEW (OUTPATIENT)
Age: 51
End: 2021-01-15

## 2021-01-15 ENCOUNTER — OUTPATIENT (OUTPATIENT)
Dept: INPATIENT UNIT | Facility: HOSPITAL | Age: 51
LOS: 1 days | Discharge: HOME | End: 2021-01-15
Payer: MEDICAID

## 2021-01-15 VITALS
HEART RATE: 106 BPM | DIASTOLIC BLOOD PRESSURE: 96 MMHG | RESPIRATION RATE: 16 BRPM | WEIGHT: 110.45 LBS | HEIGHT: 63 IN | TEMPERATURE: 98 F | SYSTOLIC BLOOD PRESSURE: 143 MMHG | OXYGEN SATURATION: 100 %

## 2021-01-15 VITALS
SYSTOLIC BLOOD PRESSURE: 131 MMHG | DIASTOLIC BLOOD PRESSURE: 81 MMHG | OXYGEN SATURATION: 99 % | HEART RATE: 109 BPM | RESPIRATION RATE: 14 BRPM | TEMPERATURE: 98 F

## 2021-01-15 DIAGNOSIS — Z90.710 ACQUIRED ABSENCE OF BOTH CERVIX AND UTERUS: Chronic | ICD-10-CM

## 2021-01-15 DIAGNOSIS — T85.49XA OTHER MECHANICAL COMPLICATION OF BREAST PROSTHESIS AND IMPLANT, INITIAL ENCOUNTER: ICD-10-CM
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
## 2021-01-15 DIAGNOSIS — C50.919 MALIGNANT NEOPLASM OF UNSPECIFIED SITE OF UNSPECIFIED FEMALE BREAST: ICD-10-CM

## 2021-01-15 DIAGNOSIS — Z45.812 ENCOUNTER FOR ADJUSTMENT OR REMOVAL OF LEFT BREAST IMPLANT: ICD-10-CM

## 2021-01-15 DIAGNOSIS — Z98.82 BREAST IMPLANT STATUS: Chronic | ICD-10-CM

## 2021-01-15 DIAGNOSIS — Z98.890 OTHER SPECIFIED POSTPROCEDURAL STATES: Chronic | ICD-10-CM

## 2021-01-15 DIAGNOSIS — Z85.3 PERSONAL HISTORY OF MALIGNANT NEOPLASM OF BREAST: ICD-10-CM

## 2021-01-15 DIAGNOSIS — Z80.3 FAMILY HISTORY OF MALIGNANT NEOPLASM OF BREAST: ICD-10-CM

## 2021-01-15 DIAGNOSIS — Z90.13 ACQUIRED ABSENCE OF BILATERAL BREASTS AND NIPPLES: ICD-10-CM

## 2021-01-15 DIAGNOSIS — L90.5 SCAR CONDITIONS AND FIBROSIS OF SKIN: ICD-10-CM

## 2021-01-15 DIAGNOSIS — N64.89 OTHER SPECIFIED DISORDERS OF BREAST: ICD-10-CM

## 2021-01-15 DIAGNOSIS — Z90.13 ACQUIRED ABSENCE OF BILATERAL BREASTS AND NIPPLES: Chronic | ICD-10-CM
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
## 2021-01-15 DIAGNOSIS — Z90.49 ACQUIRED ABSENCE OF OTHER SPECIFIED PARTS OF DIGESTIVE TRACT: Chronic | ICD-10-CM

## 2021-01-15 DIAGNOSIS — Z87.891 PERSONAL HISTORY OF NICOTINE DEPENDENCE: ICD-10-CM

## 2021-01-15 DIAGNOSIS — Z45.811 ENCOUNTER FOR ADJUSTMENT OR REMOVAL OF RIGHT BREAST IMPLANT: ICD-10-CM

## 2021-01-15 PROCEDURE — 88300 SURGICAL PATH GROSS: CPT | Mod: 26,59

## 2021-01-15 PROCEDURE — 88304 TISSUE EXAM BY PATHOLOGIST: CPT | Mod: 26,59

## 2021-01-15 PROCEDURE — C9290: CPT

## 2021-01-15 PROCEDURE — C1789: CPT

## 2021-01-15 PROCEDURE — 88304 TISSUE EXAM BY PATHOLOGIST: CPT

## 2021-01-15 PROCEDURE — 88300 SURGICAL PATH GROSS: CPT

## 2021-01-15 RX ORDER — ACETAMINOPHEN 500 MG
975 TABLET ORAL ONCE
Refills: 0 | Status: COMPLETED | OUTPATIENT
Start: 2021-01-15 | End: 2021-01-15

## 2021-01-15 RX ORDER — FAMOTIDINE 10 MG/ML
20 INJECTION INTRAVENOUS ONCE
Refills: 0 | Status: COMPLETED | OUTPATIENT
Start: 2021-01-15 | End: 2021-01-15

## 2021-01-15 RX ORDER — MEPERIDINE HYDROCHLORIDE 50 MG/ML
12.5 INJECTION INTRAMUSCULAR; INTRAVENOUS; SUBCUTANEOUS
Refills: 0 | Status: DISCONTINUED | OUTPATIENT
Start: 2021-01-15 | End: 2021-01-15

## 2021-01-15 RX ORDER — ALPRAZOLAM 0.25 MG
1 TABLET ORAL
Qty: 0 | Refills: 0 | DISCHARGE

## 2021-01-15 RX ORDER — HYDROMORPHONE HYDROCHLORIDE 2 MG/ML
0.5 INJECTION INTRAMUSCULAR; INTRAVENOUS; SUBCUTANEOUS
Refills: 0 | Status: DISCONTINUED | OUTPATIENT
Start: 2021-01-15 | End: 2021-01-15

## 2021-01-15 RX ORDER — VENLAFAXINE HCL 75 MG
1 CAPSULE, EXT RELEASE 24 HR ORAL
Qty: 0 | Refills: 0 | DISCHARGE

## 2021-01-15 RX ORDER — FENTANYL CITRATE 50 UG/ML
50 INJECTION INTRAVENOUS
Refills: 0 | Status: DISCONTINUED | OUTPATIENT
Start: 2021-01-15 | End: 2021-01-15

## 2021-01-15 RX ORDER — SODIUM CHLORIDE 9 MG/ML
1000 INJECTION, SOLUTION INTRAVENOUS
Refills: 0 | Status: DISCONTINUED | OUTPATIENT
Start: 2021-01-15 | End: 2021-01-15

## 2021-01-15 RX ORDER — OXYCODONE HYDROCHLORIDE 5 MG/1
10 TABLET ORAL ONCE
Refills: 0 | Status: DISCONTINUED | OUTPATIENT
Start: 2021-01-15 | End: 2021-01-15

## 2021-01-15 RX ORDER — ONDANSETRON 8 MG/1
4 TABLET, FILM COATED ORAL ONCE
Refills: 0 | Status: DISCONTINUED | OUTPATIENT
Start: 2021-01-15 | End: 2021-01-15

## 2021-01-15 RX ADMIN — Medication 975 MILLIGRAM(S): at 11:10

## 2021-01-15 RX ADMIN — FENTANYL CITRATE 50 MICROGRAM(S): 50 INJECTION INTRAVENOUS at 16:33

## 2021-01-15 RX ADMIN — OXYCODONE HYDROCHLORIDE 10 MILLIGRAM(S): 5 TABLET ORAL at 16:31

## 2021-01-15 NOTE — H&P ADULT - PROBLEM SELECTOR PLAN 1
Breast reconstruction surgery today  Pain management  Advance diet   OOB to chair   Incentive spirometry  DC to home when asu criteria

## 2021-01-15 NOTE — H&P ADULT - HISTORY OF PRESENT ILLNESS
Pt comes to the office looking for improvement in breast shape and contour after having breast reconstruction.

## 2021-01-15 NOTE — H&P ADULT - NSHPPHYSICALEXAM_GEN_ALL_CORE
Neuro: A+O x4 CALDERON  RESP: CTA A/P  CV: RRR S1 S2  Breast: contour irregularity of bilateral breast, ptotic  ABD: Soft non tender +BS

## 2021-01-15 NOTE — ASU DISCHARGE PLAN (ADULT/PEDIATRIC) - CARE PROVIDER_API CALL
Torsten Lagunas)  Surgery  [address] [place] [address]  Phone: [phone]  Fax: [phone]  Follow Up Time:

## 2021-01-15 NOTE — H&P ADULT - NSICDXPASTMEDICALHX_GEN_ALL_CORE_FT
PAST MEDICAL HISTORY:  Anxiety     BRCA positive     Breast cancer in female Right    Gastric polyp History of

## 2021-01-15 NOTE — ASU DISCHARGE PLAN (ADULT/PEDIATRIC) - ASU DC SPECIAL INSTRUCTIONSFT
You should follow-up in the office on Thursday the 21st, please call for follow-up appointment if you have not already made one. [phone].   Prescription pain medications have been prescribed for you, take as needed for pain only. Do not drive a vehicle or operate machinery while taking narcotic pain medication.   Ambulating is very important post operatively, make sure you ambulate several times daily.   If you experience bleeding that does not stop, swelling, hardness of surgical site, chest pain, shortness of breath, leg pain, dizziness or any signs or symptoms of infection such as fever, redness, pus, foul smell of surgical site please contact the office immediately, [phone].

## 2021-01-15 NOTE — ASU DISCHARGE PLAN (ADULT/PEDIATRIC) - PROCEDURE
Revision of bilateral breast reconstrution with removal and replacement of bilateral silicone implants  with bilateral mastopexy and fat grafting to the breast

## 2021-01-15 NOTE — H&P ADULT - NSICDXPASTSURGICALHX_GEN_ALL_CORE_FT
PAST SURGICAL HISTORY:  H/O breast augmentation 2004    History of cholecystectomy 1989    History of mastectomy, bilateral with implants  12/20/2018    History of other surgery Liposuction    S/P breast reconstruction, bilateral 06/2019    S/P ANIVAL-BSO 2/2020

## 2021-02-17 ENCOUNTER — OUTPATIENT (OUTPATIENT)
Dept: OUTPATIENT SERVICES | Facility: HOSPITAL | Age: 51
LOS: 1 days | Discharge: HOME | End: 2021-02-17

## 2021-02-17 DIAGNOSIS — Z90.13 ACQUIRED ABSENCE OF BILATERAL BREASTS AND NIPPLES: Chronic | ICD-10-CM

## 2021-02-17 DIAGNOSIS — Z90.49 ACQUIRED ABSENCE OF OTHER SPECIFIED PARTS OF DIGESTIVE TRACT: Chronic | ICD-10-CM

## 2021-02-17 DIAGNOSIS — Z98.890 OTHER SPECIFIED POSTPROCEDURAL STATES: Chronic | ICD-10-CM

## 2021-02-17 DIAGNOSIS — C50.919 MALIGNANT NEOPLASM OF UNSPECIFIED SITE OF UNSPECIFIED FEMALE BREAST: ICD-10-CM

## 2021-02-17 DIAGNOSIS — Z98.82 BREAST IMPLANT STATUS: Chronic | ICD-10-CM

## 2021-02-17 DIAGNOSIS — Z90.710 ACQUIRED ABSENCE OF BOTH CERVIX AND UTERUS: Chronic | ICD-10-CM

## 2021-02-19 ENCOUNTER — APPOINTMENT (OUTPATIENT)
Dept: HEMATOLOGY ONCOLOGY | Facility: CLINIC | Age: 51
End: 2021-02-19
Payer: MEDICAID

## 2021-02-19 ENCOUNTER — RESULT REVIEW (OUTPATIENT)
Age: 51
End: 2021-02-19

## 2021-02-19 VITALS
BODY MASS INDEX: 23.92 KG/M2 | WEIGHT: 135.01 LBS | HEART RATE: 90 BPM | HEIGHT: 63 IN | DIASTOLIC BLOOD PRESSURE: 84 MMHG | OXYGEN SATURATION: 100 % | SYSTOLIC BLOOD PRESSURE: 126 MMHG

## 2021-02-19 LAB
ALBUMIN SERPL ELPH-MCNC: 4.5 G/DL
ALP BLD-CCNC: 101 U/L
ALT SERPL-CCNC: 73 U/L
ANION GAP SERPL CALC-SCNC: 13 MMOL/L
AST SERPL-CCNC: 29 U/L
BASOPHILS # BLD AUTO: 0.1 K/UL — SIGNIFICANT CHANGE UP (ref 0–0.2)
BASOPHILS NFR BLD AUTO: 1.8 % — SIGNIFICANT CHANGE UP (ref 0–2)
BILIRUB SERPL-MCNC: 0.3 MG/DL
BUN SERPL-MCNC: 15 MG/DL
CALCIUM SERPL-MCNC: 9.9 MG/DL
CHLORIDE SERPL-SCNC: 103 MMOL/L
CO2 SERPL-SCNC: 24 MMOL/L
CREAT SERPL-MCNC: 0.79 MG/DL
EOSINOPHIL # BLD AUTO: 0.2 K/UL — SIGNIFICANT CHANGE UP (ref 0–0.5)
EOSINOPHIL NFR BLD AUTO: 4.3 % — SIGNIFICANT CHANGE UP (ref 0–6)
GLUCOSE SERPL-MCNC: 82 MG/DL
HCT VFR BLD CALC: 44.7 % — SIGNIFICANT CHANGE UP (ref 34.5–45)
HGB BLD-MCNC: 14.2 G/DL — SIGNIFICANT CHANGE UP (ref 11.5–15.5)
LYMPHOCYTES # BLD AUTO: 1.6 K/UL — SIGNIFICANT CHANGE UP (ref 1–3.3)
LYMPHOCYTES # BLD AUTO: 39.5 % — SIGNIFICANT CHANGE UP (ref 13–44)
MCHC RBC-ENTMCNC: 31.1 PG — SIGNIFICANT CHANGE UP (ref 27–34)
MCHC RBC-ENTMCNC: 31.7 G/DL — LOW (ref 32–36)
MCV RBC AUTO: 98.3 FL — SIGNIFICANT CHANGE UP (ref 80–100)
MONOCYTES # BLD AUTO: 0.5 K/UL — SIGNIFICANT CHANGE UP (ref 0–0.9)
MONOCYTES NFR BLD AUTO: 11.7 % — SIGNIFICANT CHANGE UP (ref 2–14)
NEUTROPHILS # BLD AUTO: 1.7 K/UL — LOW (ref 1.8–7.4)
NEUTROPHILS NFR BLD AUTO: 42.7 % — LOW (ref 43–77)
PLATELET # BLD AUTO: 224 K/UL — SIGNIFICANT CHANGE UP (ref 150–400)
POTASSIUM SERPL-SCNC: 4.2 MMOL/L
PROT SERPL-MCNC: 6.9 G/DL
RBC # BLD: 4.55 M/UL — SIGNIFICANT CHANGE UP (ref 3.8–5.2)
RBC # FLD: 11.8 % — SIGNIFICANT CHANGE UP (ref 10.3–14.5)
SODIUM SERPL-SCNC: 140 MMOL/L
WBC # BLD: 4 K/UL — SIGNIFICANT CHANGE UP (ref 3.8–10.5)
WBC # FLD AUTO: 4 K/UL — SIGNIFICANT CHANGE UP (ref 3.8–10.5)

## 2021-02-19 PROCEDURE — 99072 ADDL SUPL MATRL&STAF TM PHE: CPT

## 2021-02-19 PROCEDURE — 99212 OFFICE O/P EST SF 10 MIN: CPT

## 2021-02-19 NOTE — HISTORY OF PRESENT ILLNESS
[de-identified] : \par Ms. Hardin was diagnosed with right breast IDC at the age of 48 in December 2018. Given positive family history, she tested positive for a BRCA1 mutation in 9/2018.\par The patient underwent screening imaging on 10/1/18 which only showed minimal bilateral fibrocystic nodularity but was otherwise a negative bilateral breast US. She then underwent an MRI on 10/26/18 which demonstrated a right upper inner quadrant irregular enhancing nodule and a second US with US biopsy was advised. \par \par An US guided biopsy was performed on 11/8/18 and pathology revealed infiltrative ductal carcinoma with no definitive lymphovascular invasion and no in situ component identified. Seymour score 7/9, ER 90% VA 50%, HER-2 negative. \par \par She is s/p bilateral mastectomy and sentinel lymph node biopsy on 12/19/18 with Dr. Vidales. \par Pathology:\par Right breast - poorly differentiated infiltrative ductal carcinoma, Boone score of 8/9, 1.7 cm. There was no definitive lymphovascular invasion or in situ component identified. The tumor showed brisk lymphocytic response and margins were negative. The skeletal muscle was negative for malignancy. The 3 lymph nodes were negative for tumor. pT1c N0\par Left breast - negative for malignancy but had focal usual ductal hyperplasia and nodular dense fibrosis. \par \par Her Oncotype score is 20 and she is BRCA1 positive [id number] pathogenic mutation.\par \par She is otherwise healthy and denies any other medical problems. Menstrual period has been irregular, with LMP in December 2018. \par \par FMHx: breast cancer in paternal aunt and grandmother\par Social Hx: 8 year smoking history but quit 22 years ago, occasional drinker, 2 children and 1 stepchild (all girls, ages 17-22), . \par \par Disease: right breast cancer \par Pathology: poorly differentiated IDC \par TNM stage: T1c, N0, Mx \par AJCC Stage: IA \par Tumor Markers: ER 90% VA 50%, HER-2 negative \par \par She started Tamoxifen on 2/3/20, but has discontinued secondary to inumerable side effects.\par  [de-identified] : Today we discussed, and Pavel agreed to, a trial of letrozole 2.5 mg daily.\par If she is unable to tolerate, she will let us know and the drug will be discontinued.\par Note that the patient has baseline hot flashes during our discussion today following her oophorectomy while off of antiestrogens.

## 2021-02-19 NOTE — ASSESSMENT
[FreeTextEntry1] : \par 49 year old female, BRCA1 positive, with stage IA poorly differentiated IDC of right breast (T1cN0, ER 90% MS 50%, HER-2 negative) s/p bilateral mastectomy on 12/19/18. Oncotype Dx 20 which is intermediate risk. Based on Tailor Rx / Oncotype Dx 20, her absolute benefit from chemotherapy is 1.6% and the risks likely outweigh the benefits. Tamoxifen instead risk of distance recurrence at 9 years is 6%. She started Tamoxifen on 2/3/30.\par S/p ANIVAL/BSO on 2/24/20.\par \par Multiple side effects to Tamoxifen reported including continued hot flashes, feeling foggy, multiple joint pains, anxiety. \par \par Unable to tolerate tamoxifen, will try letrozole for a brief period of time to assess tolerability.

## 2021-02-19 NOTE — PHYSICAL EXAM
[Fully active, able to carry on all pre-disease performance without restriction] : Status 0 - Fully active, able to carry on all pre-disease performance without restriction [de-identified] : Hot flashes occurred during discussion today

## 2021-02-23 ENCOUNTER — APPOINTMENT (OUTPATIENT)
Dept: DERMATOLOGY | Facility: CLINIC | Age: 51
End: 2021-02-23
Payer: MEDICAID

## 2021-02-23 PROCEDURE — 99203 OFFICE O/P NEW LOW 30 MIN: CPT

## 2021-02-23 PROCEDURE — 99072 ADDL SUPL MATRL&STAF TM PHE: CPT

## 2021-03-09 ENCOUNTER — APPOINTMENT (OUTPATIENT)
Dept: DERMATOLOGY | Facility: CLINIC | Age: 51
End: 2021-03-09
Payer: MEDICAID

## 2021-03-09 PROCEDURE — 99072 ADDL SUPL MATRL&STAF TM PHE: CPT

## 2021-03-09 PROCEDURE — 99212 OFFICE O/P EST SF 10 MIN: CPT

## 2021-03-09 PROCEDURE — D0125: CPT

## 2021-03-20 NOTE — DISCHARGE NOTE PROVIDER - NSDCHC_MEDRECSTATUS_GEN_ALL_CORE
Admission Reconciliation is Completed  Discharge Reconciliation is Not Complete Admission Reconciliation is Completed  Discharge Reconciliation is Completed detailed exam negative

## 2021-04-16 RX ORDER — LETROZOLE TABLETS 2.5 MG/1
2.5 TABLET, FILM COATED ORAL
Qty: 30 | Refills: 3 | Status: DISCONTINUED | COMMUNITY
Start: 2021-02-19 | End: 2021-04-16

## 2021-04-16 RX ORDER — TAMOXIFEN CITRATE 20 MG/1
20 TABLET, FILM COATED ORAL DAILY
Qty: 90 | Refills: 2 | Status: DISCONTINUED | COMMUNITY
Start: 2019-05-08 | End: 2021-04-16

## 2021-05-12 ENCOUNTER — EMERGENCY (EMERGENCY)
Facility: HOSPITAL | Age: 51
LOS: 0 days | Discharge: HOME | End: 2021-05-12
Attending: EMERGENCY MEDICINE
Payer: MEDICAID

## 2021-05-12 VITALS
TEMPERATURE: 98 F | SYSTOLIC BLOOD PRESSURE: 134 MMHG | DIASTOLIC BLOOD PRESSURE: 70 MMHG | HEART RATE: 84 BPM | RESPIRATION RATE: 17 BRPM | OXYGEN SATURATION: 100 %

## 2021-05-12 VITALS — WEIGHT: 130.07 LBS | HEIGHT: 63 IN
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
## 2021-05-12 DIAGNOSIS — S61.212A LACERATION WITHOUT FOREIGN BODY OF RIGHT MIDDLE FINGER WITHOUT DAMAGE TO NAIL, INITIAL ENCOUNTER: ICD-10-CM

## 2021-05-12 DIAGNOSIS — F41.9 ANXIETY DISORDER, UNSPECIFIED: ICD-10-CM

## 2021-05-12 DIAGNOSIS — S61.210A LACERATION WITHOUT FOREIGN BODY OF RIGHT INDEX FINGER WITHOUT DAMAGE TO NAIL, INITIAL ENCOUNTER: ICD-10-CM

## 2021-05-12 DIAGNOSIS — Z90.49 ACQUIRED ABSENCE OF OTHER SPECIFIED PARTS OF DIGESTIVE TRACT: Chronic | ICD-10-CM

## 2021-05-12 DIAGNOSIS — Z98.890 OTHER SPECIFIED POSTPROCEDURAL STATES: Chronic | ICD-10-CM

## 2021-05-12 DIAGNOSIS — Z85.3 PERSONAL HISTORY OF MALIGNANT NEOPLASM OF BREAST: ICD-10-CM

## 2021-05-12 DIAGNOSIS — Z90.13 ACQUIRED ABSENCE OF BILATERAL BREASTS AND NIPPLES: Chronic | ICD-10-CM

## 2021-05-12 DIAGNOSIS — Z87.891 PERSONAL HISTORY OF NICOTINE DEPENDENCE: ICD-10-CM

## 2021-05-12 DIAGNOSIS — Z98.82 BREAST IMPLANT STATUS: Chronic | ICD-10-CM

## 2021-05-12 DIAGNOSIS — S61.411A LACERATION WITHOUT FOREIGN BODY OF RIGHT HAND, INITIAL ENCOUNTER: ICD-10-CM

## 2021-05-12 DIAGNOSIS — Z90.710 ACQUIRED ABSENCE OF BOTH CERVIX AND UTERUS: Chronic | ICD-10-CM

## 2021-05-12 PROCEDURE — 13132 CMPLX RPR F/C/C/M/N/AX/G/H/F: CPT

## 2021-05-12 PROCEDURE — 99285 EMERGENCY DEPT VISIT HI MDM: CPT | Mod: 25

## 2021-05-12 PROCEDURE — 99284 EMERGENCY DEPT VISIT MOD MDM: CPT

## 2021-05-12 RX ORDER — CEPHALEXIN 500 MG
1 CAPSULE ORAL
Qty: 21 | Refills: 0
Start: 2021-05-12 | End: 2021-05-18

## 2021-05-12 NOTE — ED STATDOCS - PROGRESS NOTE DETAILS
laceration repaired by Dr. Daniel, wound care and S&S of infection discussed, will d/c home with outpt f/u with Dr. Daniel, pt agreeable to d/c and plan of care.  Jackelyn Gil PA-C

## 2021-05-12 NOTE — ED STATDOCS - CLINICAL SUMMARY MEDICAL DECISION MAKING FREE TEXT BOX
49 y/o F presents to the ED with laceration to right hand sent in to see Dr. Daniel. Exam with +1 cm laceration to base of right 2nd digit, +smaller lacerations to PIP of R 3rd 4th and 5th digits. Will page plastics and reassess.

## 2021-05-12 NOTE — ED ADULT TRIAGE NOTE - CHIEF COMPLAINT QUOTE
Pt comes to the ED complaining of laceration to her right hand. Pt states that she was seen at urgent care and sent to the ED for further treatment. Pt cut it on an electric .

## 2021-05-12 NOTE — ED STATDOCS - NS ED ROS FT
Constitutional: No fever or chills  Eyes: No visual changes  HEENT: No throat pain  CV: No chest pain  Resp: No SOB no cough  GI: No abd pain, nausea or vomiting  : No dysuria  MSK: No musculoskeletal pain  Skin: No rash. +laceration R hand with +pain  Neuro: No headache

## 2021-05-12 NOTE — ED STATDOCS - PATIENT PORTAL LINK FT
You can access the FollowMyHealth Patient Portal offered by [facility] by registering at the following website: [email or web address]. By joining [facility]’s FollowMyHealth portal, you will also be able to view your health information using other applications (apps) compatible with our system.

## 2021-05-12 NOTE — ED STATDOCS - NS_ ATTENDINGSCRIBEDETAILS _ED_A_ED_FT
I, Rg Huerta MD,  performed the initial face to face bedside interview with this patient regarding history of present illness, review of symptoms and relevant past medical, social and family history.  I completed an independent physical examination.  I was the initial provider who evaluated this patient.  The history, relevant review of systems, past medical and surgical history, medical decision making, and physical examination was documented by the scribe in my presence and I attest to the accuracy of the documentation.

## 2021-05-12 NOTE — ED STATDOCS - CARE PROVIDER_API CALL
Chiki Osuna)  Plastic Surgery  [address], [address], [place] [address]  Phone: [phone]  Fax: [phone]  Follow Up Time:

## 2021-05-12 NOTE — ED STATDOCS - PHYSICAL EXAMINATION
Constitutional: NAD AAOx3  Eyes: PERRLA EOMI  Head: Normocephalic atraumatic  Mouth: MMM  Cardiac: regular rate   Resp: Lungs CTAB  GI: Abd s/nt/nd  Neuro: CN2-12 intact  Skin: +1 cm laceration to base of right 2nd digit, +smaller lacerations to PIP of R 3rd 4th and 5th digits.

## 2021-05-18 ENCOUNTER — APPOINTMENT (OUTPATIENT)
Dept: SURGERY | Facility: CLINIC | Age: 51
End: 2021-05-18
Payer: MEDICAID

## 2021-05-18 VITALS
BODY MASS INDEX: 23.39 KG/M2 | HEART RATE: 96 BPM | TEMPERATURE: 97.8 F | DIASTOLIC BLOOD PRESSURE: 85 MMHG | OXYGEN SATURATION: 98 % | WEIGHT: 132 LBS | SYSTOLIC BLOOD PRESSURE: 128 MMHG | HEIGHT: 63 IN

## 2021-05-18 DIAGNOSIS — Z90.13 ACQUIRED ABSENCE OF BILATERAL BREASTS AND NIPPLES: ICD-10-CM

## 2021-05-18 PROCEDURE — 99214 OFFICE O/P EST MOD 30 MIN: CPT

## 2021-05-18 NOTE — PHYSICAL EXAM
[Normocephalic] : normocephalic [Atraumatic] : atraumatic [EOMI] : extra ocular movement intact [PERRL] : pupils equal, round and reactive to light [Sclera nonicteric] : sclera nonicteric [Supple] : supple [No Supraclavicular Adenopathy] : no supraclavicular adenopathy [Examined in the supine and seated position] : examined in the supine and seated position [No dominant masses] : no dominant masses in right breast  [No dominant masses] : no dominant masses left breast [No Nipple Retraction] : no left nipple retraction [No Nipple Discharge] : no left nipple discharge [Breast Nipple Inversion] : nipples not inverted [Breast Nipple Retraction] : nipples not retracted [Breast Nipple Flattening] : nipples not flattened [Breast Nipple Fissures] : nipples not fissured [Breast Abnormal Lactation (Galactorrhea)] : no galactorrhea [Breast Abnormal Secretion Bloody Fluid] : no bloody discharge [Breast Abnormal Secretion Serous Fluid] : no serous discharge [Breast Abnormal Secretion Opalescent Fluid] : no milky discharge [No Axillary Lymphadenopathy] : no left axillary lymphadenopathy [No Edema] : no edema [No Rashes] : no rashes [No Ulceration] : no ulceration [de-identified] : Mastectomy flap healing well.\par  [de-identified] : Mastectomy flap healing well.\par

## 2021-05-18 NOTE — ASSESSMENT
[FreeTextEntry1] : 51 yo female presents with right breast infiltrating carcinoma Histologic grade 7/9 ER 90% [place] 50 % her2 negative.  She is BRCA 1 positve.   She underwent bilateral nipple sparring mastectomy with right SLNB and direct saline implants 12/19/2018.  The margins were clear and 0/3 lymph nodes negative for carcinoma.  Oncotype score was 20.  She will undergo [facility] BSO on 3/17/2021.  She is on 4 week holiday from letrozole and will be rotating to another AI after she returns from her [place] vacation.  She will follow up with Dr. Goodwin with hormonal therapy treatment.  Recommendation for follow up in 6 months\par 1.  Follow up in 6 months\par 2. Followup with medical oncology\par

## 2021-05-18 NOTE — HISTORY OF PRESENT ILLNESS
[FreeTextEntry1] : I had the pleasure of seeing PAVEL CANTU  in the office today for a followup visit. She is s/p bilateral skin and nipple sparring mastectomy with right SLNB for right breast (T1c, N0,Mx) IDC grade 3 ER 90% OR 50% Her2 negative 12/19/2018.\par \par Pavel is the niece of Marlene Santos (BRCA1 positive).  She underwent screening imaging which came back Birads 1 negative.  She then underwent MRI 10/26/2018 which demonstrated a right upper inner quadrant irregular enhancing nodule and a second look ultrasound with US biopsy was advised.  She underwent biopsy demonstrating right IDC grade 2 ER 90% OR 50% Her2 negative.  On 12/19/2018 she underwent bilateral skin sparring and nipple sparring mastectomy with right SLNB. \par \par PSX:  Dr Cabrera TRH/BSO 3/17/2020\par \par Imaging:  [facility]\par 10/3/2018  Mammo screening ros implant BILAT\par Impression:  No radiographic evidence of significant breast pathology or developing malignancy.  In the absence of any new clinically worrisome findings and if the patient meets screening criteria, followup annual mammography performed with tomosynthesis would be advised.   BIRADS 1 negative\par \par 10/26/2018  MRI Breast PRE and POST IV contrast\par Impression:  Right breast upper inner quadrant irregular enhancing mass.  No correlate seen on screening mammogram and breast ultrasound performed 10/3/2018.  A targeted second look right breast ultrasound with ultrasound guided core biopsy of suspicious lesion is advised.  Recommendation:  Additional Imaging Evaluation.  BIRADS 0  Incomplete:  Needs additional imaging evaluation\par \par 11/8/2018  Pathology:  Infiltrating ductal carcinoma Boone score 7/9 ER 90% OR 50% HER2 negative\par 12/19/2018  Final surgical pathology:  See EMR\par \par We reviewed clinical exam and clinical exam is benign today.  She is on 4 week holiday from letrozole and will be rotating to another AI after she returns from her [place] vacation.  She will follow up with Dr. Goodwin with hormonal therapy treatment.  Recommendation for follow up in 6 months.\par \par She understands and agrees with plan.  All questions answered.\par

## 2021-07-01 ENCOUNTER — OUTPATIENT (OUTPATIENT)
Dept: OUTPATIENT SERVICES | Facility: HOSPITAL | Age: 51
LOS: 1 days | Discharge: HOME | End: 2021-07-01

## 2021-07-01 DIAGNOSIS — Z98.890 OTHER SPECIFIED POSTPROCEDURAL STATES: Chronic | ICD-10-CM

## 2021-07-01 DIAGNOSIS — Z90.710 ACQUIRED ABSENCE OF BOTH CERVIX AND UTERUS: Chronic | ICD-10-CM

## 2021-07-01 DIAGNOSIS — Z90.49 ACQUIRED ABSENCE OF OTHER SPECIFIED PARTS OF DIGESTIVE TRACT: Chronic | ICD-10-CM

## 2021-07-01 DIAGNOSIS — Z90.13 ACQUIRED ABSENCE OF BILATERAL BREASTS AND NIPPLES: Chronic | ICD-10-CM

## 2021-07-01 DIAGNOSIS — Z98.82 BREAST IMPLANT STATUS: Chronic | ICD-10-CM

## 2021-07-01 DIAGNOSIS — C50.919 MALIGNANT NEOPLASM OF UNSPECIFIED SITE OF UNSPECIFIED FEMALE BREAST: ICD-10-CM

## 2021-07-06 ENCOUNTER — APPOINTMENT (OUTPATIENT)
Dept: HEMATOLOGY ONCOLOGY | Facility: CLINIC | Age: 51
End: 2021-07-06
Payer: MEDICAID

## 2021-07-29 ENCOUNTER — APPOINTMENT (OUTPATIENT)
Dept: HEMATOLOGY ONCOLOGY | Facility: CLINIC | Age: 51
End: 2021-07-29
Payer: MEDICAID

## 2021-07-29 PROCEDURE — 99441: CPT

## 2021-07-30 NOTE — HISTORY OF PRESENT ILLNESS
[Disease: _____________________] : Disease: [unfilled] [T: ___] : T[unfilled] [N: ___] : N[unfilled] [M: ___] : M[unfilled] [AJCC Stage: ____] : AJCC Stage: [unfilled] [Home] : at home, [unfilled] , at the time of the visit. [Medical Office: ([facility])___] : at the medical office located in  [Other:____] : [unfilled] [Verbal consent obtained from patient] : the patient, [unfilled] [de-identified] : \par Ms. Hardin was diagnosed with right breast IDC at the age of 48 in December 2018. Given positive family history, she tested positive for a BRCA1 mutation in 9/2018.\par The patient underwent screening imaging on 10/1/18 which only showed minimal bilateral fibrocystic nodularity but was otherwise a negative bilateral breast US. She then underwent an MRI on 10/26/18 which demonstrated a right upper inner quadrant irregular enhancing nodule and a second US with US biopsy was advised. \par \par An US guided biopsy was performed on 11/8/18 and pathology revealed infiltrative ductal carcinoma with no definitive lymphovascular invasion and no in situ component identified. Hulett score 7/9, ER 90% MS 50%, HER-2 negative. \par \par She is s/p bilateral mastectomy and sentinel lymph node biopsy on 12/19/18 with Dr. Vidales. \par Pathology:\par Right breast - poorly differentiated infiltrative ductal carcinoma, Boone score of 8/9, 1.7 cm. There was no definitive lymphovascular invasion or in situ component identified. The tumor showed brisk lymphocytic response and margins were negative. The skeletal muscle was negative for malignancy. The 3 lymph nodes were negative for tumor. pT1c N0\par Left breast - negative for malignancy but had focal usual ductal hyperplasia and nodular dense fibrosis. \par \par Her Oncotype score is 20 and she is BRCA1 positive [id number] pathogenic mutation.\par \par She is otherwise healthy and denies any other medical problems. Menstrual period has been irregular, with LMP in December 2018. \par \par FMHx: breast cancer in paternal aunt and grandmother\par Social Hx: 8 year smoking history but quit 22 years ago, occasional drinker, 2 children and 1 stepchild (all girls, ages 17-22), . \par \par Disease: right breast cancer \par Pathology: poorly differentiated IDC \par TNM stage: T1c, N0, Mx \par AJCC Stage: IA \par Tumor Markers: ER 90% MS 50%, HER-2 negative \par \par She started Tamoxifen on 2/3/20, but has discontinued secondary to inumerable side effects.\par  [de-identified] : poorly differentiated IDC [de-identified] :  ER 90% [place] 50%, HER-2 negative [de-identified] : Started Anastrazole on 6/1/21.\par She feels tired all the time and has achiness that is generalized.\par Her knees and ankles hurt. She had steroid injection to shoulder and elbow on the right side since then.\par

## 2021-07-30 NOTE — ADDENDUM
[FreeTextEntry1] : Documented by Soumya Kitchen acting as a scribe for Dr. Damir Goodwin 07/29/2021.\par \par All medical record entries made by the Scribe were at my, Dr. Damir Goodwin, direction and personally dictated by me on 07/29/2021. I have reviewed the chart and agree that the record accurately reflects my personal performance of the history, physical exam, assessment and plan.  I have also personally directed, reviewed, and agreed with the chart.

## 2021-07-30 NOTE — ASSESSMENT
[FreeTextEntry1] : 49 year old female, BRCA1 positive, with stage IA poorly differentiated IDC of right breast (T1cN0,  ER 90% [place] 50%, HER-2 negative) s/p bilateral mastectomy on 12/19/18.  Oncotype Dx 20 which is intermediate risk. Based on Tailor Rx / Oncotype Dx 20, her absolute benefit from chemotherapy is 1.6% and the risks likely outweigh the benefits.  Tamoxifen instead risk of distance recurrence at 9 years is 6%.   She started Tamoxifen on 2/3/30.\par S/p ANIVAL/BSO on 2/24/20.\par Discontinued Tamoxifen in 11/2020 due to hot flashes, feeling foggy, multiple joint pains, anxiety.  \par Started on Letrozole in 2/2021\par Letrozole discontinued 4/16/21 due to multiple side effects: nausea, dizziness, ringing in ear, joint pains.\par Started Anastrazole 5/18/21.\par \par Tolerating anastrazole poorly, has generalized achiness of joints. \par \par \par Plan:\par Discussed antiinflammatories for AI associated arthralgias. She has meloxicam at home which she is willing to try on a regular basis in hopes to being able to stay on the anastrazole. \par Referral to Dr. Gamble for AI ssociated arthralgias\par Continue Anastrazole\par Follow up in 3 months\par \par Total time on phone: 7 minutes

## 2021-09-03 ENCOUNTER — OUTPATIENT (OUTPATIENT)
Dept: OUTPATIENT SERVICES | Facility: HOSPITAL | Age: 51
LOS: 1 days | Discharge: HOME | End: 2021-09-03

## 2021-09-03 DIAGNOSIS — Z90.13 ACQUIRED ABSENCE OF BILATERAL BREASTS AND NIPPLES: Chronic | ICD-10-CM

## 2021-09-03 DIAGNOSIS — Z90.49 ACQUIRED ABSENCE OF OTHER SPECIFIED PARTS OF DIGESTIVE TRACT: Chronic | ICD-10-CM

## 2021-09-03 DIAGNOSIS — Z98.82 BREAST IMPLANT STATUS: Chronic | ICD-10-CM

## 2021-09-03 DIAGNOSIS — C50.919 MALIGNANT NEOPLASM OF UNSPECIFIED SITE OF UNSPECIFIED FEMALE BREAST: ICD-10-CM

## 2021-09-03 DIAGNOSIS — Z98.890 OTHER SPECIFIED POSTPROCEDURAL STATES: Chronic | ICD-10-CM

## 2021-09-03 DIAGNOSIS — Z90.710 ACQUIRED ABSENCE OF BOTH CERVIX AND UTERUS: Chronic | ICD-10-CM

## 2021-09-07 ENCOUNTER — RESULT REVIEW (OUTPATIENT)
Age: 51
End: 2021-09-07

## 2021-09-07 ENCOUNTER — APPOINTMENT (OUTPATIENT)
Dept: HEMATOLOGY ONCOLOGY | Facility: CLINIC | Age: 51
End: 2021-09-07
Payer: MEDICAID

## 2021-09-07 VITALS
RESPIRATION RATE: 16 BRPM | DIASTOLIC BLOOD PRESSURE: 77 MMHG | WEIGHT: 135.19 LBS | SYSTOLIC BLOOD PRESSURE: 131 MMHG | OXYGEN SATURATION: 99 % | TEMPERATURE: 97.5 F | HEART RATE: 98 BPM | HEIGHT: 63 IN | BODY MASS INDEX: 23.95 KG/M2

## 2021-09-07 LAB
BASOPHILS # BLD AUTO: 0.1 K/UL — SIGNIFICANT CHANGE UP (ref 0–0.2)
BASOPHILS NFR BLD AUTO: 1.7 % — SIGNIFICANT CHANGE UP (ref 0–2)
EOSINOPHIL # BLD AUTO: 0.3 K/UL — SIGNIFICANT CHANGE UP (ref 0–0.5)
EOSINOPHIL NFR BLD AUTO: 5 % — SIGNIFICANT CHANGE UP (ref 0–6)
HCT VFR BLD CALC: 43 % — SIGNIFICANT CHANGE UP (ref 34.5–45)
HGB BLD-MCNC: 14.3 G/DL — SIGNIFICANT CHANGE UP (ref 11.5–15.5)
LYMPHOCYTES # BLD AUTO: 1.4 K/UL — SIGNIFICANT CHANGE UP (ref 1–3.3)
LYMPHOCYTES # BLD AUTO: 27.9 % — SIGNIFICANT CHANGE UP (ref 13–44)
MCHC RBC-ENTMCNC: 31.6 PG — SIGNIFICANT CHANGE UP (ref 27–34)
MCHC RBC-ENTMCNC: 33.3 G/DL — SIGNIFICANT CHANGE UP (ref 32–36)
MCV RBC AUTO: 94.7 FL — SIGNIFICANT CHANGE UP (ref 80–100)
MONOCYTES # BLD AUTO: 0.4 K/UL — SIGNIFICANT CHANGE UP (ref 0–0.9)
MONOCYTES NFR BLD AUTO: 8.4 % — SIGNIFICANT CHANGE UP (ref 2–14)
NEUTROPHILS # BLD AUTO: 2.9 K/UL — SIGNIFICANT CHANGE UP (ref 1.8–7.4)
NEUTROPHILS NFR BLD AUTO: 56.9 % — SIGNIFICANT CHANGE UP (ref 43–77)
PLATELET # BLD AUTO: 258 K/UL — SIGNIFICANT CHANGE UP (ref 150–400)
RBC # BLD: 4.54 M/UL — SIGNIFICANT CHANGE UP (ref 3.8–5.2)
RBC # FLD: 11.3 % — SIGNIFICANT CHANGE UP (ref 10.3–14.5)
WBC # BLD: 5.2 K/UL — SIGNIFICANT CHANGE UP (ref 3.8–10.5)
WBC # FLD AUTO: 5.2 K/UL — SIGNIFICANT CHANGE UP (ref 3.8–10.5)

## 2021-09-07 PROCEDURE — 99215 OFFICE O/P EST HI 40 MIN: CPT

## 2021-09-07 RX ORDER — CEPHALEXIN 500 MG/1
500 CAPSULE ORAL
Qty: 21 | Refills: 0 | Status: DISCONTINUED | COMMUNITY
Start: 2021-05-12

## 2021-09-07 NOTE — ASSESSMENT
[FreeTextEntry1] : 49 year old female, BRCA1 positive, with stage IA poorly differentiated IDC of right breast (T1cN0,  ER 90% [place] 50%, HER-2 negative) s/p bilateral mastectomy on 12/19/18.  Oncotype Dx 20 which is intermediate risk. Based on Tailor Rx / Oncotype Dx 20, her absolute benefit from chemotherapy is 1.6% and the risks likely outweigh the benefits.  Tamoxifen instead risk of distance recurrence at 9 years is 6%.   She started Tamoxifen on 2/3/30.\par S/p ANIVAL/BSO on 2/24/20.\par Discontinued Tamoxifen in 11/2020 due to hot flashes, feeling foggy, multiple joint pains, anxiety.  \par Started on Letrozole in 2/2021\par Letrozole discontinued 4/16/21 due to multiple side effects: nausea, dizziness, ringing in ear, joint pains.\par Started Anastrazole 5/18/21.\par \par Tolerating anastrazole poorly, has generalized achiness of joints. Meloxicam did not help. \par \par \par Plan:\par AI associated arthralgias - seeing Dr. Gamble on 9/10/21\par Continue Anastrazole for now\par Intermittent chest pain - cardiology referral\par Needs screening colonoscopy, was advised about importance - GI referral \par Follow up in 3 months\par \par

## 2021-09-07 NOTE — HISTORY OF PRESENT ILLNESS
[Disease: _____________________] : Disease: [unfilled] [T: ___] : T[unfilled] [N: ___] : N[unfilled] [M: ___] : M[unfilled] [AJCC Stage: ____] : AJCC Stage: [unfilled] [Home] : at home, [unfilled] , at the time of the visit. [Medical Office: ([facility])___] : at the medical office located in  [Other:____] : [unfilled] [Verbal consent obtained from patient] : the patient, [unfilled] [de-identified] : \par Ms. Hardin was diagnosed with right breast IDC at the age of 48 in December 2018. Given positive family history, she tested positive for a BRCA1 mutation in 9/2018.\par The patient underwent screening imaging on 10/1/18 which only showed minimal bilateral fibrocystic nodularity but was otherwise a negative bilateral breast US. She then underwent an MRI on 10/26/18 which demonstrated a right upper inner quadrant irregular enhancing nodule and a second US with US biopsy was advised. \par \par An US guided biopsy was performed on 11/8/18 and pathology revealed infiltrative ductal carcinoma with no definitive lymphovascular invasion and no in situ component identified. Excelsior score 7/9, ER 90% HI 50%, HER-2 negative. \par \par She is s/p bilateral mastectomy and sentinel lymph node biopsy on 12/19/18 with Dr. Vidales. \par Pathology:\par Right breast - poorly differentiated infiltrative ductal carcinoma, Boone score of 8/9, 1.7 cm. There was no definitive lymphovascular invasion or in situ component identified. The tumor showed brisk lymphocytic response and margins were negative. The skeletal muscle was negative for malignancy. The 3 lymph nodes were negative for tumor. pT1c N0\par Left breast - negative for malignancy but had focal usual ductal hyperplasia and nodular dense fibrosis. \par \par Her Oncotype score is 20 and she is BRCA1 positive [id number] pathogenic mutation.\par \par She is otherwise healthy and denies any other medical problems. Menstrual period has been irregular, with LMP in December 2018. \par \par FMHx: breast cancer in paternal aunt and grandmother\par Social Hx: 8 year smoking history but quit 22 years ago, occasional drinker, 2 children and 1 stepchild (all girls, ages 17-22), . \par \par Disease: right breast cancer \par Pathology: poorly differentiated IDC \par TNM stage: T1c, N0, Mx \par AJCC Stage: IA \par Tumor Markers: ER 90% HI 50%, HER-2 negative \par \par She started Tamoxifen on 2/3/20, but has discontinued secondary to inumerable side effects.\par  [de-identified] : poorly differentiated IDC [de-identified] :  ER 90% IL 50%, HER-2 negative [de-identified] : Started Anastrazole on 6/1/21.\par Notes generalized joint pain continues, feels miserable. \par Meloxicam did not work. \par Sharp shooting chest pain intermittently, can occur at rest. \par She has not yet had a colonoscopy.

## 2021-09-09 ENCOUNTER — NON-APPOINTMENT (OUTPATIENT)
Age: 51
End: 2021-09-09

## 2021-09-10 ENCOUNTER — APPOINTMENT (OUTPATIENT)
Dept: CARDIOLOGY | Facility: CLINIC | Age: 51
End: 2021-09-10
Payer: MEDICAID

## 2021-09-10 ENCOUNTER — APPOINTMENT (OUTPATIENT)
Dept: PHYSICAL MEDICINE AND REHAB | Facility: CLINIC | Age: 51
End: 2021-09-10
Payer: MEDICAID

## 2021-09-10 ENCOUNTER — NON-APPOINTMENT (OUTPATIENT)
Age: 51
End: 2021-09-10

## 2021-09-10 VITALS
SYSTOLIC BLOOD PRESSURE: 122 MMHG | OXYGEN SATURATION: 98 % | HEART RATE: 80 BPM | HEIGHT: 63 IN | WEIGHT: 135 LBS | TEMPERATURE: 96.8 F | BODY MASS INDEX: 23.92 KG/M2 | DIASTOLIC BLOOD PRESSURE: 70 MMHG

## 2021-09-10 VITALS — DIASTOLIC BLOOD PRESSURE: 70 MMHG | SYSTOLIC BLOOD PRESSURE: 120 MMHG

## 2021-09-10 VITALS
HEIGHT: 63 IN | WEIGHT: 135 LBS | DIASTOLIC BLOOD PRESSURE: 76 MMHG | HEART RATE: 97 BPM | BODY MASS INDEX: 23.92 KG/M2 | SYSTOLIC BLOOD PRESSURE: 132 MMHG

## 2021-09-10 VITALS — TEMPERATURE: 98 F

## 2021-09-10 DIAGNOSIS — R07.9 CHEST PAIN, UNSPECIFIED: ICD-10-CM

## 2021-09-10 DIAGNOSIS — R23.2 FLUSHING: ICD-10-CM

## 2021-09-10 DIAGNOSIS — G89.28 OTHER CHRONIC POSTPROCEDURAL PAIN: ICD-10-CM

## 2021-09-10 DIAGNOSIS — E78.5 HYPERLIPIDEMIA, UNSPECIFIED: ICD-10-CM

## 2021-09-10 DIAGNOSIS — M79.2 NEURALGIA AND NEURITIS, UNSPECIFIED: ICD-10-CM

## 2021-09-10 PROCEDURE — 99204 OFFICE O/P NEW MOD 45 MIN: CPT

## 2021-09-10 PROCEDURE — 93000 ELECTROCARDIOGRAM COMPLETE: CPT

## 2021-09-10 RX ORDER — IBUPROFEN 800 MG
800 TABLET ORAL
Refills: 0 | Status: DISCONTINUED | COMMUNITY
End: 2021-09-10

## 2021-09-10 NOTE — PHYSICAL EXAM
[Well Developed] : well developed [Well Nourished] : well nourished [No Acute Distress] : no acute distress [Normal Venous Pressure] : normal venous pressure [Normal Conjunctiva] : normal conjunctiva [No Carotid Bruit] : no carotid bruit [Normal S1, S2] : normal S1, S2 [No Murmur] : no murmur [No Rub] : no rub [Clear Lung Fields] : clear lung fields [No Gallop] : no gallop [Good Air Entry] : good air entry [No Respiratory Distress] : no respiratory distress  [Soft] : abdomen soft [Non Tender] : non-tender [No Masses/organomegaly] : no masses/organomegaly [Normal Bowel Sounds] : normal bowel sounds [Normal Gait] : normal gait [No Edema] : no edema [No Cyanosis] : no cyanosis [No Clubbing] : no clubbing [No Varicosities] : no varicosities [No Rash] : no rash [No Skin Lesions] : no skin lesions [Moves all extremities] : moves all extremities [Normal Speech] : normal speech [No Focal Deficits] : no focal deficits [Alert and Oriented] : alert and oriented [Normal memory] : normal memory

## 2021-09-10 NOTE — REVIEW OF SYSTEMS
[Chest Discomfort] : chest discomfort [Joint Pain] : joint pain [Joint Stiffness] : joint stiffness [Negative] : Heme/Lymph

## 2021-09-10 NOTE — PHYSICAL EXAM
[FreeTextEntry1] : Gen: Patient is A&O x 3, NAD\par HEENT: EOMI, hearing grossly normal\par Resp: regular, non - labored\par CV: pulses regular\par Skin: no rashes, erythema\par Lymph: no clubbing, cyanosis, edema, or palpable lymphadenopathy\par Inspection: no instability or misalignment\par ROM: full throughout\par Palpation: TTP diffusely in upper extremity joints, no erythema or effusion \par Sensation: intact to light touch\par Reflexes: 1+ and symmetric throughout\par Strength: 5/5 throughout\par Special tests: +Right finkelstein's test, -spurling sign, -hoffmans sign \par Gait: normal, non-antalgic\par \par

## 2021-09-10 NOTE — HISTORY OF PRESENT ILLNESS
[FreeTextEntry1] : 50F h/o anxiety, R-breast cancer (ER/DC+, HER2-) and BRCA1 mutation dx 2018 s/p bilateral mastectomy with breast implants, ANIVAL/BSO, on anastrazole with joint pain, with intermittent chest pain, refer for cardiology evaluation. \par \par Bilateral sharp chest pain daily for few months, usually for few seconds, associated with shortness of breath, not with exertion, she has not been exercise due to diffuse joints pain, planning to see PM&R, been through physical therapy, tried Tylenol without relieve, no fond of taking medications. \par \par Not yet been vaccinated for COVID due to hesitancy. \par \par \par Lab 2021:\par , TG 82, HDL 90, \par A1c 5.2%\par \par No CAD or stroke in family, father  from ALS\par Former smoker quit 24yrs, social alcohol

## 2021-09-10 NOTE — ASSESSMENT
[FreeTextEntry1] : 50 year old female presenting for evaluation.\par \par #Aromatase inhibitor induced arthralgias.\par -Stop venlafaxine.  Start Cymbalta 30 mg daily.\par -Start glucosamine/chondroitin 1119-3535 mg daily.\par -Patient counseled on aerobic exercise program.  Goal of 20-30 minutes daily of moderate activity.\par -Start occupational therapy for hand therapy.\par -Oncology notes, CBC CMP reviewed, breast surgery notes reviewed.  Gynecology notes reviewed.\par \par Follow up in 3-4 weeks.

## 2021-09-10 NOTE — HISTORY OF PRESENT ILLNESS
[FreeTextEntry1] : Ms. 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 Oncotype Dx 20 which is intermediate risk. She started Tamoxifen on 2/3/30.  S/p ANIVAL/BSO on 2/24/20.  Discontinued Tamoxifen in 11/2020. \par Started on Letrozole in 2/2021.  Letrozole discontinued 4/16/21 due to multiple side effects.  Started Anastrazole 5/18/21.\par \par She reports she has had persistent diffuse joint pains.  She states this has been persistent since starting her hormone therapy.  She states that her worst pains can be in her upper extremities and arms and her hands.  She describes a chronic aching.  She denies any overt weakness or radiation of pain or bowel bladder problems.  In the past she has tried venlafaxine for hot flashes she is unsure if this is helping.  She is also tried physical therapy which she did not think helped.  She tried Mobic as well which did not help.\par \par \par \par

## 2021-09-10 NOTE — DISCUSSION/SUMMARY
[FreeTextEntry1] : 50F h/o anxiety, R-breast cancer (ER/PA+, HER2-) and BRCA1 mutation dx 2018 s/p bilateral mastectomy with breast implants, ANIVAL/BSO, on anastrazole with joint pain, with intermittent chest pain, refer for cardiology evaluation. \par \par Nonexertional bilateral chest wall pain with low cardiac risk factors suspect musculoskeletal related, however will check baseline echocardiogram and exercise treadmill stress test as has not been actively exercise recently. Borderline elevated cholesterol need dietary modification. \par \par 1. Atypical chest pain- await TTE and EST. \par \par 2. HLD- advised low fat diet and explained that anastrazole can also disrupt lipid metabolism, monitor without statin therapy for now given multiple joint pain and she does not want to take additional med. \par \par 3. Diffuse joint pain- seeing PM&R, continue exercise training. \par \par 4. Early stage breast cancer- no chemo/radiation, on hormonal treatment. \par \par \par Follow up as needed if the above tests are normal.

## 2021-09-23 LAB
ALBUMIN SERPL ELPH-MCNC: 4.8 G/DL
ALP BLD-CCNC: 101 U/L
ALT SERPL-CCNC: 24 U/L
ANION GAP SERPL CALC-SCNC: 16 MMOL/L
AST SERPL-CCNC: 17 U/L
BILIRUB SERPL-MCNC: 0.2 MG/DL
BUN SERPL-MCNC: 17 MG/DL
CALCIUM SERPL-MCNC: 9.9 MG/DL
CHLORIDE SERPL-SCNC: 104 MMOL/L
CO2 SERPL-SCNC: 20 MMOL/L
CREAT SERPL-MCNC: 0.7 MG/DL
GLUCOSE SERPL-MCNC: 138 MG/DL
POTASSIUM SERPL-SCNC: 4.6 MMOL/L
PROT SERPL-MCNC: 7 G/DL
SODIUM SERPL-SCNC: 140 MMOL/L

## 2021-09-29 ENCOUNTER — APPOINTMENT (OUTPATIENT)
Dept: CARDIOLOGY | Facility: CLINIC | Age: 51
End: 2021-09-29

## 2021-10-07 ENCOUNTER — APPOINTMENT (OUTPATIENT)
Dept: GASTROENTEROLOGY | Facility: CLINIC | Age: 51
End: 2021-10-07
Payer: MEDICAID

## 2021-10-07 VITALS
HEART RATE: 95 BPM | BODY MASS INDEX: 23.92 KG/M2 | DIASTOLIC BLOOD PRESSURE: 63 MMHG | HEIGHT: 63 IN | WEIGHT: 135 LBS | TEMPERATURE: 98.1 F | SYSTOLIC BLOOD PRESSURE: 103 MMHG | RESPIRATION RATE: 16 BRPM | OXYGEN SATURATION: 98 %

## 2021-10-07 DIAGNOSIS — E61.1 IRON DEFICIENCY: ICD-10-CM

## 2021-10-07 DIAGNOSIS — K59.09 OTHER CONSTIPATION: ICD-10-CM

## 2021-10-07 DIAGNOSIS — Z12.11 ENCOUNTER FOR SCREENING FOR MALIGNANT NEOPLASM OF COLON: ICD-10-CM

## 2021-10-07 PROCEDURE — 99214 OFFICE O/P EST MOD 30 MIN: CPT

## 2021-10-07 PROCEDURE — 99204 OFFICE O/P NEW MOD 45 MIN: CPT

## 2021-10-07 NOTE — REVIEW OF SYSTEMS
[As Noted in HPI] : as noted in HPI [Negative] : Heme/Lymph [FreeTextEntry9] : joint pain/swelling 2/2 anastrazole

## 2021-10-07 NOTE — ASSESSMENT
[FreeTextEntry1] : Patient is a 49 y/o female with PMH breast cancer s/p b/l mastectomy and breast implants, ANIVAL/BSO BRCA1 mutation who presents for colon cancer screening. \par \par Pt dx with iron deficiency with hematology,  Hgb 14.3, on iron supplement every other day but still c/o constipation. \par \par Colonoscopy to be scheduled. I have discussed the indications, risks and benefits of procedure with patient. Risks include, but not limited to, bleeding, perforation, infection, and reaction to anesthesia. Alternatives to colonoscopy discussed with patient. Patient was given the opportunity to ask questions, all questions were answered. The patient agrees to proceed with colonoscopy. Patient is medically optimized for colonoscopy. \par \par Gavilyte prep to be used. Bowel prep instructions discussed at length. \par \par CBC/CMP done with hematology, reviewed labs.

## 2021-10-08 ENCOUNTER — NON-APPOINTMENT (OUTPATIENT)
Age: 51
End: 2021-10-08

## 2021-10-21 PROBLEM — G89.28 POST-MASTECTOMY PAIN SYNDROME: Status: ACTIVE | Noted: 2021-10-21

## 2021-10-22 ENCOUNTER — APPOINTMENT (OUTPATIENT)
Dept: PHYSICAL MEDICINE AND REHAB | Facility: CLINIC | Age: 51
End: 2021-10-22

## 2021-10-29 ENCOUNTER — APPOINTMENT (OUTPATIENT)
Dept: PHYSICAL MEDICINE AND REHAB | Facility: CLINIC | Age: 51
End: 2021-10-29
Payer: MEDICAID

## 2021-10-29 VITALS — HEIGHT: 63 IN | TEMPERATURE: 97.1 F | BODY MASS INDEX: 23.92 KG/M2 | WEIGHT: 135 LBS

## 2021-10-29 PROCEDURE — 99214 OFFICE O/P EST MOD 30 MIN: CPT

## 2021-10-29 NOTE — HISTORY OF PRESENT ILLNESS
[FreeTextEntry1] : Ms. 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 Oncotype Dx 20 which is intermediate risk. She started Tamoxifen on 2/3/30.  S/p ANIVAL/BSO on 2/24/20.  Discontinued Tamoxifen in 11/2020. \par Started on Letrozole in 2/2021.  Letrozole discontinued 4/16/21 due to multiple side effects.  Started Anastrazole 5/18/21.\par \par She reports she still having significant diffuse pain throughout her body.  She continues to follow-up with the orthopedic surgeon.  She describes the worst pain is throughout her hands and in her joints.  She recently stopped anastrozole and is waiting to see if her symptoms improve.  She started Cymbalta and reports it did not have any significant improvement in her symptoms.  Her primary care physician recently started her on Adderall.  She is currently in occupational therapy which she thinks may be helping.  She thinks glucosamine/chondroitin is not helping.\par \par \par \par

## 2021-10-29 NOTE — PHYSICAL EXAM
[FreeTextEntry1] : Gen: Patient is A&O x 3, NAD\par HEENT: EOMI, hearing grossly normal\par Resp: regular, non - labored\par CV: pulses regular\par Skin: no rashes, erythema\par Lymph: no clubbing, cyanosis, edema, or palpable lymphadenopathy\par Inspection: no instability or misalignment\par ROM: full throughout\par Palpation: TTP diffusely in upper extremity joints, no erythema or effusion \par Sensation: intact to light touch\par Reflexes: 1+ and symmetric throughout\par Strength: 5/5 throughout\par Special tests: +Right Finkelstein test, -hoffmans sign \par Gait: normal, non-antalgic\par \par

## 2021-10-29 NOTE — ASSESSMENT
[FreeTextEntry1] : 50 year old female presenting for evaluation.\par \par #Aromatase inhibitor induced arthralgias.\par -Stopped anastrazole 2 weeks ago, still having symptoms.  \par -Stopped venlafaxine.  Reports glucosamine-chrondroitin and Cymbalta are not effective. \par -Continue OT\par -Start gabapentin 300mg TID\par -Case discussed with Sada Julian-Medical oncology\par \par #Anxiety/PTSD:\par -Refer to onco -psychology \par \par Follow up in 4 weeks.

## 2021-10-30 ENCOUNTER — OUTPATIENT (OUTPATIENT)
Dept: OUTPATIENT SERVICES | Facility: HOSPITAL | Age: 51
LOS: 1 days | Discharge: HOME | End: 2021-10-30

## 2021-10-30 DIAGNOSIS — Z90.710 ACQUIRED ABSENCE OF BOTH CERVIX AND UTERUS: Chronic | ICD-10-CM

## 2021-10-30 DIAGNOSIS — Z98.82 BREAST IMPLANT STATUS: Chronic | ICD-10-CM

## 2021-10-30 DIAGNOSIS — Z90.13 ACQUIRED ABSENCE OF BILATERAL BREASTS AND NIPPLES: Chronic | ICD-10-CM

## 2021-10-30 DIAGNOSIS — Z98.890 OTHER SPECIFIED POSTPROCEDURAL STATES: Chronic | ICD-10-CM

## 2021-10-30 DIAGNOSIS — C50.919 MALIGNANT NEOPLASM OF UNSPECIFIED SITE OF UNSPECIFIED FEMALE BREAST: ICD-10-CM

## 2021-10-30 DIAGNOSIS — Z90.49 ACQUIRED ABSENCE OF OTHER SPECIFIED PARTS OF DIGESTIVE TRACT: Chronic | ICD-10-CM

## 2021-11-02 ENCOUNTER — NON-APPOINTMENT (OUTPATIENT)
Age: 51
End: 2021-11-02

## 2021-11-04 ENCOUNTER — APPOINTMENT (OUTPATIENT)
Dept: HEMATOLOGY ONCOLOGY | Facility: CLINIC | Age: 51
End: 2021-11-04
Payer: MEDICAID

## 2021-11-04 ENCOUNTER — RESULT REVIEW (OUTPATIENT)
Age: 51
End: 2021-11-04

## 2021-11-04 VITALS
WEIGHT: 135 LBS | OXYGEN SATURATION: 99 % | HEART RATE: 94 BPM | SYSTOLIC BLOOD PRESSURE: 137 MMHG | DIASTOLIC BLOOD PRESSURE: 90 MMHG | BODY MASS INDEX: 23.92 KG/M2 | HEIGHT: 63 IN

## 2021-11-04 LAB
BASOPHILS # BLD AUTO: 0.1 K/UL — SIGNIFICANT CHANGE UP (ref 0–0.2)
BASOPHILS NFR BLD AUTO: 1.6 % — SIGNIFICANT CHANGE UP (ref 0–2)
EOSINOPHIL # BLD AUTO: 0.1 K/UL — SIGNIFICANT CHANGE UP (ref 0–0.5)
EOSINOPHIL NFR BLD AUTO: 2.7 % — SIGNIFICANT CHANGE UP (ref 0–6)
HCT VFR BLD CALC: 45.1 % — HIGH (ref 34.5–45)
HGB BLD-MCNC: 14.5 G/DL — SIGNIFICANT CHANGE UP (ref 11.5–15.5)
LYMPHOCYTES # BLD AUTO: 1.7 K/UL — SIGNIFICANT CHANGE UP (ref 1–3.3)
LYMPHOCYTES # BLD AUTO: 33.5 % — SIGNIFICANT CHANGE UP (ref 13–44)
MCHC RBC-ENTMCNC: 31.2 PG — SIGNIFICANT CHANGE UP (ref 27–34)
MCHC RBC-ENTMCNC: 32.1 G/DL — SIGNIFICANT CHANGE UP (ref 32–36)
MCV RBC AUTO: 97.2 FL — SIGNIFICANT CHANGE UP (ref 80–100)
MONOCYTES # BLD AUTO: 0.4 K/UL — SIGNIFICANT CHANGE UP (ref 0–0.9)
MONOCYTES NFR BLD AUTO: 8.2 % — SIGNIFICANT CHANGE UP (ref 2–14)
NEUTROPHILS # BLD AUTO: 2.7 K/UL — SIGNIFICANT CHANGE UP (ref 1.8–7.4)
NEUTROPHILS NFR BLD AUTO: 54 % — SIGNIFICANT CHANGE UP (ref 43–77)
PLATELET # BLD AUTO: 250 K/UL — SIGNIFICANT CHANGE UP (ref 150–400)
RBC # BLD: 4.64 M/UL — SIGNIFICANT CHANGE UP (ref 3.8–5.2)
RBC # FLD: 11.3 % — SIGNIFICANT CHANGE UP (ref 10.3–14.5)
WBC # BLD: 5 K/UL — SIGNIFICANT CHANGE UP (ref 3.8–10.5)
WBC # FLD AUTO: 5 K/UL — SIGNIFICANT CHANGE UP (ref 3.8–10.5)

## 2021-11-04 PROCEDURE — 99215 OFFICE O/P EST HI 40 MIN: CPT

## 2021-11-04 RX ORDER — VENLAFAXINE HYDROCHLORIDE 37.5 MG/1
37.5 CAPSULE, EXTENDED RELEASE ORAL DAILY
Qty: 30 | Refills: 6 | Status: DISCONTINUED | COMMUNITY
Start: 2020-02-25 | End: 2021-11-04

## 2021-11-11 ENCOUNTER — OUTPATIENT (OUTPATIENT)
Dept: OUTPATIENT SERVICES | Facility: HOSPITAL | Age: 51
LOS: 1 days | Discharge: HOME | End: 2021-11-11

## 2021-11-11 DIAGNOSIS — F43.20 ADJUSTMENT DISORDER, UNSPECIFIED: ICD-10-CM

## 2021-11-11 DIAGNOSIS — Z98.890 OTHER SPECIFIED POSTPROCEDURAL STATES: Chronic | ICD-10-CM

## 2021-11-11 DIAGNOSIS — Z90.13 ACQUIRED ABSENCE OF BILATERAL BREASTS AND NIPPLES: Chronic | ICD-10-CM

## 2021-11-11 DIAGNOSIS — Z90.710 ACQUIRED ABSENCE OF BOTH CERVIX AND UTERUS: Chronic | ICD-10-CM

## 2021-11-11 DIAGNOSIS — Z90.49 ACQUIRED ABSENCE OF OTHER SPECIFIED PARTS OF DIGESTIVE TRACT: Chronic | ICD-10-CM

## 2021-11-11 DIAGNOSIS — Z98.82 BREAST IMPLANT STATUS: Chronic | ICD-10-CM

## 2021-11-12 LAB
ALBUMIN SERPL ELPH-MCNC: 4.7 G/DL
ALP BLD-CCNC: 109 U/L
ALT SERPL-CCNC: 24 U/L
ANION GAP SERPL CALC-SCNC: 11 MMOL/L
AST SERPL-CCNC: 23 U/L
BILIRUB SERPL-MCNC: 0.3 MG/DL
BUN SERPL-MCNC: 11 MG/DL
CALCIUM SERPL-MCNC: 9.8 MG/DL
CHLORIDE SERPL-SCNC: 105 MMOL/L
CO2 SERPL-SCNC: 23 MMOL/L
CREAT SERPL-MCNC: 0.69 MG/DL
GLUCOSE SERPL-MCNC: 124 MG/DL
POTASSIUM SERPL-SCNC: 4.7 MMOL/L
PROT SERPL-MCNC: 6.7 G/DL
SODIUM SERPL-SCNC: 140 MMOL/L

## 2021-11-12 NOTE — ASSESSMENT
[FreeTextEntry1] : 49 year old female, BRCA1 positive, with stage IA poorly differentiated IDC of right breast (T1cN0,  ER 90% [place] 50%, HER-2 negative) s/p bilateral mastectomy on 12/19/18.  Oncotype Dx 20 which is intermediate risk. Based on [employer] / Oncotype Dx 20, her absolute benefit from chemotherapy is 1.6% and the risks likely outweigh the benefits.  Tamoxifen instead risk of distance recurrence at 9 years is 6%.   She started Tamoxifen on 2/3/19\par S/p ANIVAL/BSO on 2/24/20.\par Discontinued Tamoxifen in 11/2020 due to hot flashes, feeling foggy, multiple joint pains, anxiety.  \par Started on Letrozole in 2/2021\par Letrozole discontinued 4/16/21 due to multiple side effects: nausea, dizziness, ringing in ear, joint pains.\par Started Anastrazole 6/1/21.\par \par Anastrazole on hold since 10/8/21, BUT still no improvement in joint / muscle pain, fatigue - all of this significanty impacting QOL.  Did not tolerate Gabapentin (took only 1 dose)\par She does not want to take any meds at this time.\par Admits to anxiety. \par \par Plan:\par Hold Anastrazole\par AI associated arthralgias - f/u with Dr. Gamble. On Duloxeetine. Referral sent to rheumatologist \par Anxiety: has a psych onc evaluation 11/15/21\par Needs screening colonoscopy, was advised about importance - f/u with Gi Dr. Lewis \par Follow up in 6 weeks

## 2021-11-12 NOTE — HISTORY OF PRESENT ILLNESS
[Disease: _____________________] : Disease: [unfilled] [T: ___] : T[unfilled] [N: ___] : N[unfilled] [M: ___] : M[unfilled] [AJCC Stage: ____] : AJCC Stage: [unfilled] [de-identified] : \par Ms. Hardin was diagnosed with right breast IDC at the age of 48 in December 2018. Given positive family history, she tested positive for a BRCA1 mutation in 9/2018.\par The patient underwent screening imaging on 10/1/18 which only showed minimal bilateral fibrocystic nodularity but was otherwise a negative bilateral breast US. She then underwent an MRI on 10/26/18 which demonstrated a right upper inner quadrant irregular enhancing nodule and a second US with US biopsy was advised. \par \par An US guided biopsy was performed on 11/8/18 and pathology revealed infiltrative ductal carcinoma with no definitive lymphovascular invasion and no in situ component identified. Stendal score 7/9, ER 90% ID 50%, HER-2 negative. \par \par She is s/p bilateral mastectomy and sentinel lymph node biopsy on 12/19/18 with Dr. Vidales. \par Pathology:\par Right breast - poorly differentiated infiltrative ductal carcinoma, Boone score of 8/9, 1.7 cm. There was no definitive lymphovascular invasion or in situ component identified. The tumor showed brisk lymphocytic response and margins were negative. The skeletal muscle was negative for malignancy. The 3 lymph nodes were negative for tumor. pT1c N0\par Left breast - negative for malignancy but had focal usual ductal hyperplasia and nodular dense fibrosis. \par \par Her Oncotype score is 20 and she is BRCA1 positive [id number] pathogenic mutation.\par \par She is otherwise healthy and denies any other medical problems. Menstrual period has been irregular, with LMP in December 2018. \par \par FMHx: breast cancer in paternal aunt and grandmother\par Social Hx: 8 year smoking history but quit 22 years ago, occasional drinker, 2 children and 1 stepchild (all girls, ages 17-22), . \par \par Disease: right breast cancer \par Pathology: poorly differentiated IDC \par TNM stage: T1c, N0, Mx \par AJCC Stage: IA \par Tumor Markers: ER 90% ID 50%, HER-2 negative \par \par She started Tamoxifen on 2/3/20, but has discontinued secondary to inumerable side effects.\par  [de-identified] : poorly differentiated IDC [de-identified] :  ER 90% IA 50%, HER-2 negative [de-identified] : Returns for follow up visit. Started Anastrazole 6/1/21.\par Anastrazole has been on hold since 10/8/21 due to arthralgias affecting QOL\par Followed up with Dr Gamble on 11/2/21, started Gabapentin on 11/1/21 "felt horrible" 2 hours after taking, experienced elevated blood pressure and difficulty swallowing, now resolved, self discontinued Gabapentin \par Receiving PT for right upper extremity pain radiating to right side of neck \par Intermittent tingling of left hand\par Improvement of bilateral ankle joint pain since holding Anastrazole \par Has bilateral hand joint pain \par Continued persistent nausea \par Intermittent daily headaches attributes to TMJ\par Feels overall stressed and anxious, feels as if has "PTSD" and poor coping mechanism since surgery using Xanax PRN, unable to work due to joint pain\par Using Adderall prescribed PCP due to hard time focusing, self discontinued after one week\par Feels "100" years old \par Requesting to stop all medication \par Has intermittent constipation due to slow release iron supplement

## 2021-11-15 ENCOUNTER — APPOINTMENT (OUTPATIENT)
Dept: HEMATOLOGY ONCOLOGY | Facility: CLINIC | Age: 51
End: 2021-11-15
Payer: MEDICAID

## 2021-11-15 DIAGNOSIS — F95.9 TIC DISORDER, UNSPECIFIED: ICD-10-CM

## 2021-11-15 PROCEDURE — 90791 PSYCH DIAGNOSTIC EVALUATION: CPT

## 2021-11-23 ENCOUNTER — NON-APPOINTMENT (OUTPATIENT)
Age: 51
End: 2021-11-23

## 2021-11-23 ENCOUNTER — APPOINTMENT (OUTPATIENT)
Dept: RHEUMATOLOGY | Facility: CLINIC | Age: 51
End: 2021-11-23
Payer: MEDICAID

## 2021-11-23 VITALS
HEART RATE: 111 BPM | HEIGHT: 63 IN | BODY MASS INDEX: 23.92 KG/M2 | DIASTOLIC BLOOD PRESSURE: 72 MMHG | SYSTOLIC BLOOD PRESSURE: 111 MMHG | OXYGEN SATURATION: 100 % | WEIGHT: 135 LBS

## 2021-11-23 DIAGNOSIS — Z15.09 GENETIC SUSCEPTIBILITY TO MALIGNANT NEOPLASM OF BREAST: ICD-10-CM

## 2021-11-23 DIAGNOSIS — M25.50 PAIN IN UNSPECIFIED JOINT: ICD-10-CM

## 2021-11-23 DIAGNOSIS — Z79.810 LONG TERM (CURRENT) USE OF SELECTIVE ESTROGEN RECEPTOR MODULATORS (SERMS): ICD-10-CM

## 2021-11-23 DIAGNOSIS — Z15.01 GENETIC SUSCEPTIBILITY TO MALIGNANT NEOPLASM OF BREAST: ICD-10-CM

## 2021-11-23 PROCEDURE — 99204 OFFICE O/P NEW MOD 45 MIN: CPT | Mod: 25

## 2021-11-23 RX ORDER — ANASTROZOLE TABLETS 1 MG/1
1 TABLET ORAL
Qty: 30 | Refills: 5 | Status: DISCONTINUED | COMMUNITY
Start: 2021-04-16 | End: 2021-11-23

## 2021-11-23 RX ORDER — GABAPENTIN 300 MG/1
300 CAPSULE ORAL 3 TIMES DAILY
Qty: 90 | Refills: 2 | Status: DISCONTINUED | COMMUNITY
Start: 2021-10-29 | End: 2021-11-23

## 2021-11-26 DIAGNOSIS — F41.1 GENERALIZED ANXIETY DISORDER: ICD-10-CM

## 2021-11-29 ENCOUNTER — APPOINTMENT (OUTPATIENT)
Dept: HEMATOLOGY ONCOLOGY | Facility: CLINIC | Age: 51
End: 2021-11-29
Payer: MEDICAID

## 2021-11-29 PROCEDURE — 90837 PSYTX W PT 60 MINUTES: CPT | Mod: 95

## 2021-12-15 ENCOUNTER — OUTPATIENT (OUTPATIENT)
Dept: OUTPATIENT SERVICES | Facility: HOSPITAL | Age: 51
LOS: 1 days | Discharge: HOME | End: 2021-12-15

## 2021-12-15 DIAGNOSIS — C50.919 MALIGNANT NEOPLASM OF UNSPECIFIED SITE OF UNSPECIFIED FEMALE BREAST: ICD-10-CM

## 2021-12-15 DIAGNOSIS — Z90.13 ACQUIRED ABSENCE OF BILATERAL BREASTS AND NIPPLES: Chronic | ICD-10-CM

## 2021-12-15 DIAGNOSIS — Z98.890 OTHER SPECIFIED POSTPROCEDURAL STATES: Chronic | ICD-10-CM

## 2021-12-15 DIAGNOSIS — Z98.82 BREAST IMPLANT STATUS: Chronic | ICD-10-CM

## 2021-12-15 DIAGNOSIS — Z90.49 ACQUIRED ABSENCE OF OTHER SPECIFIED PARTS OF DIGESTIVE TRACT: Chronic | ICD-10-CM

## 2021-12-15 DIAGNOSIS — Z90.710 ACQUIRED ABSENCE OF BOTH CERVIX AND UTERUS: Chronic | ICD-10-CM

## 2021-12-20 ENCOUNTER — APPOINTMENT (OUTPATIENT)
Dept: HEMATOLOGY ONCOLOGY | Facility: CLINIC | Age: 51
End: 2021-12-20

## 2021-12-29 ENCOUNTER — NON-APPOINTMENT (OUTPATIENT)
Age: 51
End: 2021-12-29

## 2021-12-29 ENCOUNTER — APPOINTMENT (OUTPATIENT)
Dept: HEMATOLOGY ONCOLOGY | Facility: CLINIC | Age: 51
End: 2021-12-29

## 2021-12-29 RX ORDER — TRAMADOL HYDROCHLORIDE 50 MG/1
50 TABLET, COATED ORAL
Qty: 60 | Refills: 2 | Status: DISCONTINUED | COMMUNITY
Start: 2021-12-01 | End: 2021-12-29

## 2022-01-07 ENCOUNTER — APPOINTMENT (OUTPATIENT)
Dept: GASTROENTEROLOGY | Facility: GI CENTER | Age: 52
End: 2022-01-07

## 2022-01-12 ENCOUNTER — APPOINTMENT (OUTPATIENT)
Dept: HEMATOLOGY ONCOLOGY | Facility: CLINIC | Age: 52
End: 2022-01-12
Payer: MEDICAID

## 2022-01-12 PROCEDURE — ZZZZZ: CPT

## 2022-01-12 NOTE — ASSESSMENT
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  Oncotype Dx 20 which is intermediate risk. Based on [employer] Rx / Oncotype Dx 20, her absolute benefit from chemotherapy is 1.6% and the risks likely outweigh the benefits. Tamoxifen instead risk of distance recurrence at 9 years is 6%.   She started Tamoxifen on 2/3/19\par S/p ANIVAL/BSO on 2/24/20.\par Discontinued Tamoxifen in 11/2020 due to hot flashes, feeling foggy, multiple joint pains, anxiety.  \par Started on Letrozole in 2/2021\par Letrozole discontinued 4/16/21 due to multiple side effects: nausea, dizziness, ringing in ear, joint pains.\par Took Anastrazole 6/1/21 - 10/8/21 and then stopped due to MSK symptoms - joint pains, fatigue\par Poor tolerance to Gabapentin.\par \par Anastrazole on hold since 10/8/21, she notes only a slight improvement in joint / muscle pain, fatigue - still, all of this significantly impacting QOL. \par She is willing to try last remaining AI -  Exemestane for the next 3 months. \par \par Plan:\par start Exemestane \par AI associated arthralgias - f/u with Dr. Gamble. On Duloxetine. \par Anxiety: Continue following up with psych-onc, Dr. Plasencia\par Follow up in 2 months \par \par Telephone: 14 minutes

## 2022-01-12 NOTE — ADDENDUM
[FreeTextEntry1] : Documented by Jerome Centeno acting as scribe for Dr. Goodwin on 01/12/2022. \par \par All Medical record entries made by the Scribe were at my, Dr. Goodwin's, direction and personally dictated by me on 01/12/2022. I have reviewed the chart and agree that the record accurately reflects my personal performance of the history, physical exam, assessment and plan. I have also personally directed, reviewed, and agreed with the discharge instructions.

## 2022-01-12 NOTE — HISTORY OF PRESENT ILLNESS
[Disease: _____________________] : Disease: [unfilled] [T: ___] : T[unfilled] [N: ___] : N[unfilled] [M: ___] : M[unfilled] [AJCC Stage: ____] : AJCC Stage: [unfilled] [Home] : at home, [unfilled] , at the time of the visit. [Medical Office: ([facility])___] : at the medical office located in  [Verbal consent obtained from patient] : the patient, [unfilled] [de-identified] : \par Ms. Hardin was diagnosed with right breast IDC at the age of 48 in December 2018. Given positive family history, she tested positive for a BRCA1 mutation in 9/2018.\par The patient underwent screening imaging on 10/1/18 which only showed minimal bilateral fibrocystic nodularity but was otherwise a negative bilateral breast US. She then underwent an MRI on 10/26/18 which demonstrated a right upper inner quadrant irregular enhancing nodule and a second US with US biopsy was advised. \par \par An US guided biopsy was performed on 11/8/18 and pathology revealed infiltrative ductal carcinoma with no definitive lymphovascular invasion and no in situ component identified. Amma score 7/9, ER 90% NE 50%, HER-2 negative. \par \par She is s/p bilateral mastectomy and sentinel lymph node biopsy on 12/19/18 with Dr. Vidales. \par Pathology:\par Right breast - poorly differentiated infiltrative ductal carcinoma, Boone score of 8/9, 1.7 cm. There was no definitive lymphovascular invasion or in situ component identified. The tumor showed brisk lymphocytic response and margins were negative. The skeletal muscle was negative for malignancy. The 3 lymph nodes were negative for tumor. pT1c N0\par Left breast - negative for malignancy but had focal usual ductal hyperplasia and nodular dense fibrosis. \par \par Her Oncotype score is 20 and she is BRCA1 positive [id number] pathogenic mutation.\par \par She is otherwise healthy and denies any other medical problems. Menstrual period has been irregular, with LMP in December 2018. \par \par FMHx: breast cancer in paternal aunt and grandmother\par Social Hx: 8 year smoking history but quit 22 years ago, occasional drinker, 2 children and 1 stepchild (all girls, ages 17-22), . \par \par Disease: right breast cancer \par Pathology: poorly differentiated IDC \par TNM stage: T1c, N0, Mx \par AJCC Stage: IA \par Tumor Markers: ER 90% NE 50%, HER-2 negative \par \par She started Tamoxifen on 2/3/20, but has discontinued secondary to inumerable side effects.\par  [de-identified] : poorly differentiated IDC [de-identified] :  ER 90% MD 50%, HER-2 negative [de-identified] : Returns for follow up visit via telephone visit.\par Anastrazole has been on hold since 10/8/21 due to arthralgias affecting QOL.  She notes slight improvement with arthralgia but still affecting QOL. \par She was seen by Rheumatology on 11/23/2021. She was given tramadol but is not taking it.  Only taking Duloxetine and Adderall. \par Recently tested positive for COVID19, she is now recovering well. \par Using Adderall prescribed PCP as per NARCISO Paulson, due to hard time focusing, using intermittently. Also takes Xanax PRN. \par Saw therapist Jennifer Plasencia 2 times. \par

## 2022-02-24 ENCOUNTER — APPOINTMENT (OUTPATIENT)
Dept: DERMATOLOGY | Facility: CLINIC | Age: 52
End: 2022-02-24

## 2022-03-11 ENCOUNTER — APPOINTMENT (OUTPATIENT)
Dept: PHYSICAL MEDICINE AND REHAB | Facility: CLINIC | Age: 52
End: 2022-03-11
Payer: MEDICAID

## 2022-03-11 DIAGNOSIS — T45.1X5A PAIN IN UNSPECIFIED JOINT: ICD-10-CM

## 2022-03-11 DIAGNOSIS — M25.50 PAIN IN UNSPECIFIED JOINT: ICD-10-CM

## 2022-03-11 DIAGNOSIS — M79.643 PAIN IN UNSPECIFIED HAND: ICD-10-CM

## 2022-03-11 PROCEDURE — 99214 OFFICE O/P EST MOD 30 MIN: CPT

## 2022-03-11 NOTE — HISTORY OF PRESENT ILLNESS
[FreeTextEntry1] : Ms. 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 Oncotype Dx 20 which is intermediate risk. She started Tamoxifen on 2/3/30.  S/p ANIVAL/BSO on 2/24/20.  Discontinued Tamoxifen in 11/2020. \par Started on Letrozole in 2/2021.  Letrozole discontinued 4/16/21 due to multiple side effects.  Started Anastrazole 5/18/21, discontinued.  Now on exemestane.  \par \par She reports now that she is still having diffuse arthralgias throughout her body.  Worse in her bilateral hands and elbows right greater than left.  States that it is not any worse with exemestane.  She has been on Cymbalta 30 mg daily and thinks that this has been helping.  She previously had performed occupational therapy which she also thought was helpful.  Denies any new overt weakness or bowel bladder dysfunction.\par \par \par

## 2022-03-11 NOTE — ASSESSMENT
[FreeTextEntry1] : 50 year old female presenting for evaluation.\par \par #Aromatase inhibitor induced arthralgias.\par -Stopped venlafaxine, previous intolerance to gabapentin.  \par -Continue glucosamine-chrondroitin \par -Increase duloxetine to 60mg daily.  Discussed given she is on Adderall risk of serotonin syndrome.  She was instructed on signs to monitor for and report to the ER immediately if she experiences any.  Given she feels some improvement with duloxetine agrees benefit is worth the risk.  Continue to closely monitor.  \par -Discussed re-imaging hands and elbows, but she would like to defer and will follow up with her orthopedist for evaluation.    \par \par #Hand pain:\par -Start occupational therapy for modalities, case discussed with Max.    \par \par Follow up in 4 weeks.

## 2022-03-15 ENCOUNTER — OUTPATIENT (OUTPATIENT)
Dept: OUTPATIENT SERVICES | Facility: HOSPITAL | Age: 52
LOS: 1 days | Discharge: HOME | End: 2022-03-15

## 2022-03-15 DIAGNOSIS — Z98.82 BREAST IMPLANT STATUS: Chronic | ICD-10-CM

## 2022-03-15 DIAGNOSIS — Z90.49 ACQUIRED ABSENCE OF OTHER SPECIFIED PARTS OF DIGESTIVE TRACT: Chronic | ICD-10-CM

## 2022-03-15 DIAGNOSIS — Z98.890 OTHER SPECIFIED POSTPROCEDURAL STATES: Chronic | ICD-10-CM

## 2022-03-15 DIAGNOSIS — Z90.710 ACQUIRED ABSENCE OF BOTH CERVIX AND UTERUS: Chronic | ICD-10-CM

## 2022-03-15 DIAGNOSIS — C50.919 MALIGNANT NEOPLASM OF UNSPECIFIED SITE OF UNSPECIFIED FEMALE BREAST: ICD-10-CM

## 2022-03-15 DIAGNOSIS — Z90.13 ACQUIRED ABSENCE OF BILATERAL BREASTS AND NIPPLES: Chronic | ICD-10-CM

## 2022-03-16 ENCOUNTER — RESULT REVIEW (OUTPATIENT)
Age: 52
End: 2022-03-16

## 2022-03-16 ENCOUNTER — APPOINTMENT (OUTPATIENT)
Dept: HEMATOLOGY ONCOLOGY | Facility: CLINIC | Age: 52
End: 2022-03-16
Payer: MEDICAID

## 2022-03-16 VITALS
HEART RATE: 91 BPM | OXYGEN SATURATION: 100 % | BODY MASS INDEX: 24.19 KG/M2 | DIASTOLIC BLOOD PRESSURE: 76 MMHG | WEIGHT: 136.5 LBS | HEIGHT: 63 IN | SYSTOLIC BLOOD PRESSURE: 113 MMHG

## 2022-03-16 LAB
BASOPHILS # BLD AUTO: 0.1 K/UL — SIGNIFICANT CHANGE UP (ref 0–0.2)
BASOPHILS NFR BLD AUTO: 2.3 % — HIGH (ref 0–2)
EOSINOPHIL # BLD AUTO: 0.1 K/UL — SIGNIFICANT CHANGE UP (ref 0–0.5)
EOSINOPHIL NFR BLD AUTO: 2 % — SIGNIFICANT CHANGE UP (ref 0–6)
HCT VFR BLD CALC: 46 % — HIGH (ref 34.5–45)
HGB BLD-MCNC: 14.6 G/DL — SIGNIFICANT CHANGE UP (ref 11.5–15.5)
LYMPHOCYTES # BLD AUTO: 1.6 K/UL — SIGNIFICANT CHANGE UP (ref 1–3.3)
LYMPHOCYTES # BLD AUTO: 32 % — SIGNIFICANT CHANGE UP (ref 13–44)
MCHC RBC-ENTMCNC: 31.7 G/DL — LOW (ref 32–36)
MCHC RBC-ENTMCNC: 32.3 PG — SIGNIFICANT CHANGE UP (ref 27–34)
MCV RBC AUTO: 101.8 FL — HIGH (ref 80–100)
MONOCYTES # BLD AUTO: 0.5 K/UL — SIGNIFICANT CHANGE UP (ref 0–0.9)
MONOCYTES NFR BLD AUTO: 9.2 % — SIGNIFICANT CHANGE UP (ref 2–14)
NEUTROPHILS # BLD AUTO: 2.8 K/UL — SIGNIFICANT CHANGE UP (ref 1.8–7.4)
NEUTROPHILS NFR BLD AUTO: 54.4 % — SIGNIFICANT CHANGE UP (ref 43–77)
PLATELET # BLD AUTO: 246 K/UL — SIGNIFICANT CHANGE UP (ref 150–400)
RBC # BLD: 4.52 M/UL — SIGNIFICANT CHANGE UP (ref 3.8–5.2)
RBC # FLD: 12.1 % — SIGNIFICANT CHANGE UP (ref 10.3–14.5)
WBC # BLD: 5.1 K/UL — SIGNIFICANT CHANGE UP (ref 3.8–10.5)
WBC # FLD AUTO: 5.1 K/UL — SIGNIFICANT CHANGE UP (ref 3.8–10.5)

## 2022-03-16 PROCEDURE — 99214 OFFICE O/P EST MOD 30 MIN: CPT

## 2022-03-18 LAB
25(OH)D3 SERPL-MCNC: 38.6 NG/ML
ALBUMIN SERPL ELPH-MCNC: 4.9 G/DL
ALP BLD-CCNC: 116 U/L
ALT SERPL-CCNC: 31 U/L
ANION GAP SERPL CALC-SCNC: 11 MMOL/L
AST SERPL-CCNC: 23 U/L
BILIRUB SERPL-MCNC: 0.2 MG/DL
BUN SERPL-MCNC: 18 MG/DL
CALCIUM SERPL-MCNC: 10 MG/DL
CHLORIDE SERPL-SCNC: 103 MMOL/L
CO2 SERPL-SCNC: 26 MMOL/L
CREAT SERPL-MCNC: 0.71 MG/DL
EGFR: 103 ML/MIN/1.73M2
GLUCOSE SERPL-MCNC: 112 MG/DL
POTASSIUM SERPL-SCNC: 4.7 MMOL/L
PROT SERPL-MCNC: 7 G/DL
SODIUM SERPL-SCNC: 140 MMOL/L

## 2022-03-18 NOTE — ASSESSMENT
[FreeTextEntry1] : 51 year old female, BRCA1 positive, with stage IA poorly differentiated IDC of right breast (T1cN0,  ER 90% FL 50%, HER-2 negative) s/p bilateral mastectomy on 12/19/18.  Oncotype Dx 20 which is intermediate risk. Based on T[employer] Rx / Oncotype Dx 20, her absolute benefit from chemotherapy is 1.6% and the risks likely outweigh the benefits. Tamoxifen instead risk of distance recurrence at 9 years is 6%.   She started Tamoxifen on 2/3/19\par S/p ANIVAL/BSO on 2/24/20.\par Discontinued Tamoxifen in 11/2020 due to hot flashes, feeling foggy, multiple joint pains, anxiety.  \par Started on Letrozole in 2/2021\par Letrozole discontinued 4/16/21 due to multiple side effects: nausea, dizziness, ringing in ear, joint pains.\par Took Anastrazole 6/1/21 - 10/8/21 and then stopped due to MSK symptoms - joint pains, fatigue\par Started Exemestane on 1/13/22\par Poor tolerance to Gabapentin.\par \par Arthralgias have not worsened on exemestane. \par \par Plan:\par Continue Exemestane \par AI associated arthralgias - Duloxetine 60 mg, advised that she give it a try for 4-6 weeks to see full benefit. \par Anxiety: Duloxetine as above, discussed that duloxetine is a preferred for anxiety\par Follow up in 2 months

## 2022-03-18 NOTE — HISTORY OF PRESENT ILLNESS
[Disease: _____________________] : Disease: [unfilled] [T: ___] : T[unfilled] [N: ___] : N[unfilled] [M: ___] : M[unfilled] [AJCC Stage: ____] : AJCC Stage: [unfilled] [de-identified] : \par Ms. Hardin was diagnosed with right breast IDC at the age of 48 in December 2018. Given positive family history, she tested positive for a BRCA1 mutation in 9/2018.\par The patient underwent screening imaging on 10/1/18 which only showed minimal bilateral fibrocystic nodularity but was otherwise a negative bilateral breast US. She then underwent an MRI on 10/26/18 which demonstrated a right upper inner quadrant irregular enhancing nodule and a second US with US biopsy was advised. \par \par An US guided biopsy was performed on 11/8/18 and pathology revealed infiltrative ductal carcinoma with no definitive lymphovascular invasion and no in situ component identified. [place] score 7/9, ER 90% MO 50%, HER-2 negative. \par \par She is s/p bilateral mastectomy and sentinel lymph node biopsy on 12/19/18 with Dr. Vidales. \par Pathology:\par Right breast - poorly differentiated infiltrative ductal carcinoma, [place] score of 8/9, 1.7 cm. There was no definitive lymphovascular invasion or in situ component identified. The tumor showed brisk lymphocytic response and margins were negative. The skeletal muscle was negative for malignancy. The 3 lymph nodes were negative for tumor. pT1c N0\par Left breast - negative for malignancy but had focal usual ductal hyperplasia and nodular dense fibrosis. \par \par Her Oncotype score is 20 and she is BRCA1 positive [id number] pathogenic mutation.\par \par She is otherwise healthy and denies any other medical problems. Menstrual period has been irregular, with LMP in December 2018. \par \par FMHx: breast cancer in paternal aunt and grandmother\par Social Hx: 8 year smoking history but quit 22 years ago, occasional drinker, 2 children and 1 stepchild (all girls, ages 17-22), . \par \par Disease: right breast cancer \par Pathology: poorly differentiated IDC \par TNM stage: T1c, N0, Mx \par AJCC Stage: IA \par Tumor Markers: ER 90% MO 50%, HER-2 negative \par \par She started Tamoxifen on 2/3/20, but has discontinued secondary to inumerable side effects.\par  [de-identified] : poorly differentiated IDC [de-identified] :  ER 90% [place] 50%, HER-2 negative [de-identified] : Returns for follow up visit\par Started Exemestane on 1/13/22\par Notes joint pain has not progressed, followed up with Mavis Mckeon, increased Cymbalta to 60mg for 4 days ago, notes side effects of drowsiness. Did not get relief of joint pain with 30mg daily \par Reports joint pain of right upper extremity and fingers \par Has sensation of ankle fullness \par Experiences hot flashes \par Continued anxiety and decrease in patience, using Xanax PRN, f/u with psych onc with minimal help, recalls on Zoloft for 2 years in 20 years of age with relief, feels manic at times, not able to work \par Notes frustration with weight gain, walking daily, not able to go to the gym due to joint pain

## 2022-03-28 LAB
14-3-3 ETA AG SER IA-MCNC: <0.2 NG/ML
A PHAGOCYTOPH IGG TITR SER IF: NORMAL TITER
A-TUMOR NECROSIS FACT SERPL-MCNC: <1.7 PG/ML
ALBUMIN SERPL ELPH-MCNC: 4.8 G/DL
ALP BLD-CCNC: 120 U/L
ALT SERPL-CCNC: 23 U/L
ANACR T: NEGATIVE
ANION GAP SERPL CALC-SCNC: 12 MMOL/L
AST SERPL-CCNC: 14 U/L
B BURGDOR AB SER QL IA: NEGATIVE
B BURGDOR AB SER-IMP: NEGATIVE
B BURGDOR IGM PATRN SER IB-IMP: NEGATIVE
B BURGDOR18KD IGG SER QL IB: NORMAL
B BURGDOR23KD IGG SER QL IB: NORMAL
B BURGDOR23KD IGM SER QL IB: NORMAL
B BURGDOR28KD IGG SER QL IB: NORMAL
B BURGDOR30KD IGG SER QL IB: NORMAL
B BURGDOR31KD IGG SER QL IB: NORMAL
B BURGDOR39KD IGG SER QL IB: NORMAL
B BURGDOR39KD IGM SER QL IB: NORMAL
B BURGDOR41KD IGG SER QL IB: PRESENT
B BURGDOR41KD IGM SER QL IB: NORMAL
B BURGDOR45KD IGG SER QL IB: NORMAL
B BURGDOR58KD IGG SER QL IB: NORMAL
B BURGDOR66KD IGG SER QL IB: NORMAL
B BURGDOR93KD IGG SER QL IB: NORMAL
B MICROTI IGG TITR SER: NORMAL TITER
BASOPHILS # BLD AUTO: 0.04 K/UL
BASOPHILS NFR BLD AUTO: 0.8 %
BILIRUB SERPL-MCNC: 0.2 MG/DL
BUN SERPL-MCNC: 12 MG/DL
CALCIUM SERPL-MCNC: 9.7 MG/DL
CCP AB SER IA-ACNC: <8 UNITS
CHLORIDE SERPL-SCNC: 103 MMOL/L
CK SERPL-CCNC: 122 U/L
CO2 SERPL-SCNC: 24 MMOL/L
CREAT SERPL-MCNC: 0.67 MG/DL
CRP SERPL-MCNC: <3 MG/L
E CHAFFEENSIS IGG TITR SER IF: NORMAL TITER
EJ AB SER QL: NEGATIVE
ENA JO1 AB SER IA-ACNC: <20 UNITS
ENA PM/SCL AB SER-ACNC: <20 UNITS
ENA SM+RNP AB SER IA-ACNC: <20 UNITS
ENA SS-A IGG SER QL: <20 UNITS
EOSINOPHIL # BLD AUTO: 0.06 K/UL
EOSINOPHIL NFR BLD AUTO: 1.3 %
ERYTHROCYTE [SEDIMENTATION RATE] IN BLOOD BY WESTERGREN METHOD: 6 MM/HR
FIBRILLARIN AB SER QL: NEGATIVE
GLUCOSE SERPL-MCNC: 88 MG/DL
HCT VFR BLD CALC: 44.8 %
HGB BLD-MCNC: 14.6 G/DL
HLA-B27 RELATED AG QL: POSITIVE
IGNF SERPL-MCNC: <4.2 PG/ML
IL10 SERPL-MCNC: <2.8 PG/ML
IL12 SERPL-MCNC: <1.9 PG/ML
IL13 SERPL-MCNC: <1.7 PG/ML
IL17A SERPL-MCNC: <1.4 PG/ML
IL2 SERPL-MCNC: 596.9 PG/ML
IL2 SERPL-MCNC: <2.1 PG/ML
IL4 SERPL-MCNC: <2.2 PG/ML
IL6 SERPL-MCNC: <2 PG/ML
IL8 SERPL-MCNC: <3 PG/ML
IMM GRANULOCYTES NFR BLD AUTO: 0.2 %
INTERLEUKIN 1 BETA: <6.5 PG/ML
INTERLEUKIN 5: <2.1 PG/ML
KU AB SER QL: NEGATIVE
LYMPHOCYTES # BLD AUTO: 1.64 K/UL
LYMPHOCYTES NFR BLD AUTO: 34.3 %
MAN DIFF?: NORMAL
MCHC RBC-ENTMCNC: 31.1 PG
MCHC RBC-ENTMCNC: 32.6 GM/DL
MCV RBC AUTO: 95.5 FL
MDA-5 (P140)(CADM-140): <20 UNITS
MI2 AB SER QL: NEGATIVE
MONOCYTES # BLD AUTO: 0.41 K/UL
MONOCYTES NFR BLD AUTO: 8.6 %
NEUTROPHILS # BLD AUTO: 2.62 K/UL
NEUTROPHILS NFR BLD AUTO: 54.8 %
NXP-2 (P140): <20 UNITS
OJ AB SER QL: NEGATIVE
PL12 AB SER QL: NEGATIVE
PL7 AB SER QL: NEGATIVE
PLATELET # BLD AUTO: 284 K/UL
POTASSIUM SERPL-SCNC: 4.3 MMOL/L
PROT SERPL-MCNC: 7 G/DL
RBC # BLD: 4.69 M/UL
RBC # FLD: 12.4 %
RF+CCP IGG SER-IMP: NEGATIVE
RHEUMATOID FACT SER QL: <10 IU/ML
SODIUM SERPL-SCNC: 140 MMOL/L
SRP AB SERPL QL: NEGATIVE
TIF GAMMA (P155/140): <20 UNITS
U2 SNRNP AB SER QL: NEGATIVE
URATE SERPL-MCNC: 3.1 MG/DL
WBC # FLD AUTO: 4.78 K/UL

## 2022-04-06 ENCOUNTER — TRANSCRIPTION ENCOUNTER (OUTPATIENT)
Age: 52
End: 2022-04-06

## 2022-04-07 ENCOUNTER — OUTPATIENT (OUTPATIENT)
Dept: OUTPATIENT SERVICES | Facility: HOSPITAL | Age: 52
LOS: 1 days | End: 2022-04-07
Payer: MEDICAID

## 2022-04-07 ENCOUNTER — APPOINTMENT (OUTPATIENT)
Dept: GASTROENTEROLOGY | Facility: GI CENTER | Age: 52
End: 2022-04-07
Payer: MEDICAID

## 2022-04-07 DIAGNOSIS — Z90.710 ACQUIRED ABSENCE OF BOTH CERVIX AND UTERUS: Chronic | ICD-10-CM

## 2022-04-07 DIAGNOSIS — Z90.13 ACQUIRED ABSENCE OF BILATERAL BREASTS AND NIPPLES: Chronic | ICD-10-CM

## 2022-04-07 DIAGNOSIS — Z98.890 OTHER SPECIFIED POSTPROCEDURAL STATES: Chronic | ICD-10-CM

## 2022-04-07 DIAGNOSIS — Z98.82 BREAST IMPLANT STATUS: Chronic | ICD-10-CM

## 2022-04-07 DIAGNOSIS — Z90.49 ACQUIRED ABSENCE OF OTHER SPECIFIED PARTS OF DIGESTIVE TRACT: Chronic | ICD-10-CM

## 2022-04-07 DIAGNOSIS — Z12.11 ENCOUNTER FOR SCREENING FOR MALIGNANT NEOPLASM OF COLON: ICD-10-CM

## 2022-04-07 PROCEDURE — G0121: CPT

## 2022-04-07 PROCEDURE — 45378 DIAGNOSTIC COLONOSCOPY: CPT | Mod: 33

## 2022-04-07 NOTE — ASSESSMENT
[FreeTextEntry1] : A/P\par screening colon\par  I discussed the risks and benefits of colonoscopy and patient was given opportunity to ask questions. Colonoscopy to r/o colon cancer, polyps, AVM's. Patient is medically optimized for the procedure\par

## 2022-05-20 ENCOUNTER — APPOINTMENT (OUTPATIENT)
Dept: DERMATOLOGY | Facility: CLINIC | Age: 52
End: 2022-05-20
Payer: MEDICAID

## 2022-05-20 PROCEDURE — 99213 OFFICE O/P EST LOW 20 MIN: CPT

## 2022-06-02 ENCOUNTER — OUTPATIENT (OUTPATIENT)
Dept: OUTPATIENT SERVICES | Facility: HOSPITAL | Age: 52
LOS: 1 days | Discharge: HOME | End: 2022-06-02

## 2022-06-02 DIAGNOSIS — Z98.82 BREAST IMPLANT STATUS: Chronic | ICD-10-CM

## 2022-06-02 DIAGNOSIS — Z90.710 ACQUIRED ABSENCE OF BOTH CERVIX AND UTERUS: Chronic | ICD-10-CM

## 2022-06-02 DIAGNOSIS — C50.919 MALIGNANT NEOPLASM OF UNSPECIFIED SITE OF UNSPECIFIED FEMALE BREAST: ICD-10-CM

## 2022-06-02 DIAGNOSIS — Z90.49 ACQUIRED ABSENCE OF OTHER SPECIFIED PARTS OF DIGESTIVE TRACT: Chronic | ICD-10-CM

## 2022-06-02 DIAGNOSIS — Z98.890 OTHER SPECIFIED POSTPROCEDURAL STATES: Chronic | ICD-10-CM

## 2022-06-02 DIAGNOSIS — Z90.13 ACQUIRED ABSENCE OF BILATERAL BREASTS AND NIPPLES: Chronic | ICD-10-CM

## 2022-06-06 ENCOUNTER — APPOINTMENT (OUTPATIENT)
Dept: HEMATOLOGY ONCOLOGY | Facility: CLINIC | Age: 52
End: 2022-06-06
Payer: MEDICAID

## 2022-06-06 DIAGNOSIS — M25.50 PAIN IN UNSPECIFIED JOINT: ICD-10-CM

## 2022-06-06 DIAGNOSIS — F41.1 GENERALIZED ANXIETY DISORDER: ICD-10-CM

## 2022-06-06 PROCEDURE — ZZZZZ: CPT

## 2022-06-06 NOTE — ASSESSMENT
[FreeTextEntry1] : 51 year old female, BRCA1 positive, with stage IA poorly differentiated IDC of right breast (T1cN0,  ER 90% [place] 50%, HER-2 negative) s/p bilateral mastectomy on 12/19/18.  Oncotype Dx 20 which is intermediate risk. Based on T[employer]or Rx / Oncotype Dx 20, her absolute benefit from chemotherapy is 1.6% and the risks likely outweigh the benefits. Tamoxifen instead risk of distance recurrence at 9 years is 6%.   She started Tamoxifen on 2/3/19\par S/p ANIVAL/BSO on 2/24/20.\par Discontinued Tamoxifen in 11/2020 due to hot flashes, feeling foggy, multiple joint pains, anxiety.  \par Started on Letrozole in 2/2021\par Letrozole discontinued 4/16/21 due to multiple side effects: nausea, dizziness, ringing in ear, joint pains.\par Took Anastrazole 6/1/21 - 10/8/21 and then stopped due to MSK symptoms - joint pains, fatigue\par Started Exemestane on 1/13/22\par Poor tolerance to Gabapentin.\par \par Arthralgias have not worsened on exemestane. She's now tolerating cymbalta 30 mg QHS better. \par \par Plan:\par Continue Exemestane \par AI associated arthralgias - Continue Duloxetine 30 mg\par Anxiety: Duloxetine as stated above.\par Follow up in 3 months \par \par time on phone: 5 mins

## 2022-06-06 NOTE — HISTORY OF PRESENT ILLNESS
[Disease: _____________________] : Disease: [unfilled] [T: ___] : T[unfilled] [N: ___] : N[unfilled] [M: ___] : M[unfilled] [AJCC Stage: ____] : AJCC Stage: [unfilled] [de-identified] : \par Ms. Hardin was diagnosed with right breast IDC at the age of 48 in December 2018. Given positive family history, she tested positive for a BRCA1 mutation in 9/2018.\par The patient underwent screening imaging on 10/1/18 which only showed minimal bilateral fibrocystic nodularity but was otherwise a negative bilateral breast US. She then underwent an MRI on 10/26/18 which demonstrated a right upper inner quadrant irregular enhancing nodule and a second US with US biopsy was advised. \par \par An US guided biopsy was performed on 11/8/18 and pathology revealed infiltrative ductal carcinoma with no definitive lymphovascular invasion and no in situ component identified. [place] score 7/9, ER 90% SC 50%, HER-2 negative. \par \par She is s/p bilateral mastectomy and sentinel lymph node biopsy on 12/19/18 with Dr. Vidales. \par Pathology:\par Right breast - poorly differentiated infiltrative ductal carcinoma, [place] score of 8/9, 1.7 cm. There was no definitive lymphovascular invasion or in situ component identified. The tumor showed brisk lymphocytic response and margins were negative. The skeletal muscle was negative for malignancy. The 3 lymph nodes were negative for tumor. pT1c N0\par Left breast - negative for malignancy but had focal usual ductal hyperplasia and nodular dense fibrosis. \par \par Her Oncotype score is 20 and she is BRCA1 positive [id number] pathogenic mutation.\par \par She is otherwise healthy and denies any other medical problems. Menstrual period has been irregular, with LMP in December 2018. \par \par FMHx: breast cancer in paternal aunt and grandmother\par Social Hx: 8 year smoking history but quit 22 years ago, occasional drinker, 2 children and 1 stepchild (all girls, ages 17-22), . \par \par Disease: right breast cancer \par Pathology: poorly differentiated IDC \par TNM stage: T1c, N0, Mx \par AJCC Stage: IA \par Tumor Markers: ER 90% SC 50%, HER-2 negative \par \par She started Tamoxifen on 2/3/20, but has discontinued secondary to inumerable side effects.\par  [de-identified] : poorly differentiated IDC [de-identified] :  ER 90% NC 50%, HER-2 negative [de-identified] : Telehealth Consent\par This encounter was done via Telehealth/ Telephone at home\par This visit was provided via telehealth using real-time 2-way audio visual technology.\par The patient TRISTAN CANTU was located at home at the time of the visit.\par Verbal consent for teleservices given on Jun 06, 2022 by TRISTAN CANTU\par \par Currently taking Duloxetine 30 mg, tolerating well\par Patient is currently taking Exemestane, tolerating well\par Patient doing well offers no complaints \par She's in the process of moving.

## 2022-06-06 NOTE — ADDENDUM
[FreeTextEntry1] : Documented by Connie Call acting as scribe for  on 06/06/2022 \par \par All Medical record entries made by the Scribe were at my, 's , direction and personally dictated by me on 06/06/2022 . I have reviewed the chart and agree that the record accurately reflects my personal performance of the history, physical exam, assessment and plan. I have also personally directed, reviewed, and agreed with the discharge instructions.\par

## 2022-07-07 ENCOUNTER — APPOINTMENT (OUTPATIENT)
Dept: DERMATOLOGY | Facility: CLINIC | Age: 52
End: 2022-07-07

## 2023-03-24 ENCOUNTER — OUTPATIENT (OUTPATIENT)
Dept: OUTPATIENT SERVICES | Facility: HOSPITAL | Age: 53
LOS: 1 days | Discharge: HOME | End: 2023-03-24

## 2023-03-24 DIAGNOSIS — Z98.82 BREAST IMPLANT STATUS: Chronic | ICD-10-CM

## 2023-03-24 DIAGNOSIS — Z90.13 ACQUIRED ABSENCE OF BILATERAL BREASTS AND NIPPLES: Chronic | ICD-10-CM

## 2023-03-24 DIAGNOSIS — Z98.890 OTHER SPECIFIED POSTPROCEDURAL STATES: Chronic | ICD-10-CM

## 2023-03-24 DIAGNOSIS — C50.919 MALIGNANT NEOPLASM OF UNSPECIFIED SITE OF UNSPECIFIED FEMALE BREAST: ICD-10-CM

## 2023-03-24 DIAGNOSIS — Z90.49 ACQUIRED ABSENCE OF OTHER SPECIFIED PARTS OF DIGESTIVE TRACT: Chronic | ICD-10-CM

## 2023-03-24 DIAGNOSIS — Z90.710 ACQUIRED ABSENCE OF BOTH CERVIX AND UTERUS: Chronic | ICD-10-CM

## 2023-03-30 ENCOUNTER — RESULT REVIEW (OUTPATIENT)
Age: 53
End: 2023-03-30

## 2023-03-30 ENCOUNTER — APPOINTMENT (OUTPATIENT)
Dept: HEMATOLOGY ONCOLOGY | Facility: CLINIC | Age: 53
End: 2023-03-30
Payer: MEDICAID

## 2023-03-30 VITALS
TEMPERATURE: 97.3 F | HEIGHT: 63 IN | WEIGHT: 130 LBS | HEART RATE: 97 BPM | SYSTOLIC BLOOD PRESSURE: 151 MMHG | BODY MASS INDEX: 23.04 KG/M2 | DIASTOLIC BLOOD PRESSURE: 95 MMHG | OXYGEN SATURATION: 99 %

## 2023-03-30 DIAGNOSIS — M89.8X9 OTHER SPECIFIED DISORDERS OF BONE, UNSPECIFIED SITE: ICD-10-CM

## 2023-03-30 LAB
BASOPHILS # BLD AUTO: 0.1 K/UL — SIGNIFICANT CHANGE UP (ref 0–0.2)
BASOPHILS NFR BLD AUTO: 2.2 % — HIGH (ref 0–2)
EOSINOPHIL # BLD AUTO: 0 K/UL — SIGNIFICANT CHANGE UP (ref 0–0.5)
EOSINOPHIL NFR BLD AUTO: 1 % — SIGNIFICANT CHANGE UP (ref 0–6)
HCT VFR BLD CALC: 44.7 % — SIGNIFICANT CHANGE UP (ref 34.5–45)
HGB BLD-MCNC: 14.9 G/DL — SIGNIFICANT CHANGE UP (ref 11.5–15.5)
LYMPHOCYTES # BLD AUTO: 1.5 K/UL — SIGNIFICANT CHANGE UP (ref 1–3.3)
LYMPHOCYTES # BLD AUTO: 35.5 % — SIGNIFICANT CHANGE UP (ref 13–44)
MCHC RBC-ENTMCNC: 32.6 PG — SIGNIFICANT CHANGE UP (ref 27–34)
MCHC RBC-ENTMCNC: 33.4 G/DL — SIGNIFICANT CHANGE UP (ref 32–36)
MCV RBC AUTO: 97.7 FL — SIGNIFICANT CHANGE UP (ref 80–100)
MONOCYTES # BLD AUTO: 0.4 K/UL — SIGNIFICANT CHANGE UP (ref 0–0.9)
MONOCYTES NFR BLD AUTO: 9.1 % — SIGNIFICANT CHANGE UP (ref 2–14)
NEUTROPHILS # BLD AUTO: 2.2 K/UL — SIGNIFICANT CHANGE UP (ref 1.8–7.4)
NEUTROPHILS NFR BLD AUTO: 52.3 % — SIGNIFICANT CHANGE UP (ref 43–77)
PLATELET # BLD AUTO: 234 K/UL — SIGNIFICANT CHANGE UP (ref 150–400)
RBC # BLD: 4.58 M/UL — SIGNIFICANT CHANGE UP (ref 3.8–5.2)
RBC # FLD: 11.8 % — SIGNIFICANT CHANGE UP (ref 10.3–14.5)
WBC # BLD: 4.2 K/UL — SIGNIFICANT CHANGE UP (ref 3.8–10.5)
WBC # FLD AUTO: 4.2 K/UL — SIGNIFICANT CHANGE UP (ref 3.8–10.5)

## 2023-03-30 PROCEDURE — 99214 OFFICE O/P EST MOD 30 MIN: CPT

## 2023-03-31 LAB
25(OH)D3 SERPL-MCNC: 36.1 NG/ML
ALBUMIN SERPL ELPH-MCNC: 5.2 G/DL
ALP BLD-CCNC: 159 U/L
ALT SERPL-CCNC: 55 U/L
ANION GAP SERPL CALC-SCNC: 14 MMOL/L
AST SERPL-CCNC: 32 U/L
BILIRUB SERPL-MCNC: 0.2 MG/DL
BUN SERPL-MCNC: 12 MG/DL
CALCIUM SERPL-MCNC: 10.3 MG/DL
CHLORIDE SERPL-SCNC: 104 MMOL/L
CO2 SERPL-SCNC: 24 MMOL/L
CREAT SERPL-MCNC: 0.78 MG/DL
EGFR: 91 ML/MIN/1.73M2
GLUCOSE SERPL-MCNC: 113 MG/DL
POTASSIUM SERPL-SCNC: 4.4 MMOL/L
PROT SERPL-MCNC: 7.4 G/DL
SODIUM SERPL-SCNC: 143 MMOL/L

## 2023-03-31 NOTE — HISTORY OF PRESENT ILLNESS
[Disease: _____________________] : Disease: [unfilled] [T: ___] : T[unfilled] [N: ___] : N[unfilled] [M: ___] : M[unfilled] [AJCC Stage: ____] : AJCC Stage: [unfilled] [de-identified] : \par Ms. Hardin was diagnosed with right breast IDC at the age of 48 in December 2018. Given positive family history, she tested positive for a BRCA1 mutation in 9/2018.\par The patient underwent screening imaging on 10/1/18 which only showed minimal bilateral fibrocystic nodularity but was otherwise a negative bilateral breast US. She then underwent an MRI on 10/26/18 which demonstrated a right upper inner quadrant irregular enhancing nodule and a second US with US biopsy was advised. \par \par An US guided biopsy was performed on 11/8/18 and pathology revealed infiltrative ductal carcinoma with no definitive lymphovascular invasion and no in situ component identified. [place] score 7/9, ER 90% NM 50%, HER-2 negative. \par \par She is s/p bilateral mastectomy and sentinel lymph node biopsy on 12/19/18 with Dr. Vidales. \par Pathology:\par Right breast - poorly differentiated infiltrative ductal carcinoma, [place] score of 8/9, 1.7 cm. There was no definitive lymphovascular invasion or in situ component identified. The tumor showed brisk lymphocytic response and margins were negative. The skeletal muscle was negative for malignancy. The 3 lymph nodes were negative for tumor. pT1c N0\par Left breast - negative for malignancy but had focal usual ductal hyperplasia and nodular dense fibrosis. \par \par Her Oncotype score is 20 and she is BRCA1 positive [id number] pathogenic mutation.\par \par She is otherwise healthy and denies any other medical problems. Menstrual period has been irregular, with LMP in December 2018. \par \par FMHx: breast cancer in paternal aunt and grandmother\par Social Hx: 8 year smoking history but quit 22 years ago, occasional drinker, 2 children and 1 stepchild (all girls, ages 17-22), . \par \par Disease: right breast cancer \par Pathology: poorly differentiated IDC \par TNM stage: T1c, N0, Mx \par AJCC Stage: IA \par Tumor Markers: ER 90% NM 50%, HER-2 negative \par \par She started Tamoxifen on 2/3/20, but has discontinued secondary to inumerable side effects.\par \par Care Team\par PCP, Dr. Meagan Keller MD\par  [de-identified] : poorly differentiated IDC [de-identified] :  ER 90% IA 50%, HER-2 negative [de-identified] : 6/6/22 via TTM: Currently taking Duloxetine 30 mg, tolerating well\par Patient is currently taking Exemestane, tolerating well\par Patient doing well offers no complaints \par She's in the process of moving.\par \par 03/30/2023 Returns for follow up visit. Reports self stopping exemestane in September 2022. S/p 5 day hospitalization in 1/2023 at [facility] d/t kidney stones, impacted speech and ?aneurysm. Reports persistent arthralgias, MSK symptoms. Reports seeing integrative medicine, Dr. Meagan Keller and starting on krill oil, phosphatidylserine, and Wellbutrin. Reports has started going to yoga again and states she has diffused mid-lower back pain. \par

## 2023-03-31 NOTE — ADDENDUM
Reason For Visit  Chief Complaint   Patient presents with   • Follow-up     6 weeks HTN, HLD, AFIB           History of Present Illness  This a very pleasant female comes here today feeling well no chest pain or shortness of breath.  Only issues involve lisinopril of which she has now an ACE cough and it is disturbing for her.  And with that said today I switched her over to losartan hydrochlorothiazide.  She tolerates the antihypertensive Atacand unfortunately not paid by her insurance and cost is driving the change.  Patient and her  will be leaving for [place] [place] in the next several weeks to about a month from now as soon as she gets her Covid vaccination and will likely stay there 3 to 4 months.  Today blood pressure and heart rate are controlled no changes with the exception of the losartan hydrochlorothiazide because of the ACE cough.  Her nuclear stress image study and stress test turned out well for her no evidence for ischemia.  Visit Vitals  /73 (BP Location: LUE - Left upper extremity, Patient Position: Sitting, Cuff Size: Regular)   Pulse 67   Resp 16   Ht 5' (1.524 m)   Wt 59.1 kg (130 lb 4.7 oz)   SpO2 99%   BMI 25.45 kg/m²        Physical Exam  Physical Exam   Constitutional: She appears healthy. No distress.   HENT: Tympanic membranes normal.   Eyes: Pupils are equal, round, and reactive to light.   Neck: Normal range of motion and thyroid normal. Neck supple.   Cardiovascular: Normal rate, S1 normal and S2 normal. An irregularly irregular rhythm present.   No murmur heard.  Pulses:       Carotid pulses are 0 on the right side and 0 on the left side.       Radial pulses are 2+ on the right side and 2+ on the left side.        Right popliteal pulse not accessible and left popliteal pulse not accessible.        Right dorsalis pedis pulse not accessible and left dorsalis pedis pulse not accessible.        Right posterior tibial pulse not accessible and left posterior tibial pulse not  [FreeTextEntry1] : Documented by Celena Serrano acting as scribe for Dr. Goodwin on 03/30/2023.\par \par All Medical record entries made by the Scribe were at my, Dr. Goodwin, direction and personally dictated by me on 03/30/2023. I have reviewed the chart and agree that the record accurately reflects my personal performance of the history, physical exam, assessment and plan. I have also personally directed, reviewed, and agreed with the discharge instructions.  accessible.   Pulmonary/Chest: Breath sounds normal. She has no wheezes. She has no rales. She exhibits no tenderness.   Abdominal: Soft. Bowel sounds are normal.   Musculoskeletal: Normal range of motion.   Neurological: She is alert and oriented to person, place, and time.   Skin: Skin is warm and dry.         CHADSVASC Stroke Risk Score = 3    No results found for: CHOLESTEROL  No results found for: HDL  No results found for: CHOHDL  No results found for: TRIGLYCERIDE  No results found for: CALCLDL      Assessment  1. Longstanding persistent atrial fibrillation (CMS/HCC)  Rate is controlled she is on Betapace, she is also taking Xarelto 20 mg nightly no complaints today.  No falls dizziness or lightheadedness.    2. Mixed hyperlipidemia  Tolerating her statin well, when she comes back from [facility] and is here later this year will get a get a lipid profile.    3. Essential hypertension  As identified earlier the effects of the lisinopril hydrochlorothiazide were quite beneficial and favorable for her unfortunately she developed an ACE cough we will switch her over to an ARB hydrochlorothiazide.      Plan  Patient will schedule an appointment she will call us when she returns from [facility] later on this year.     End of Visit Meds  Current Outpatient Medications   Medication Sig Dispense Refill   • rivaroxaban (XARELTO) 20 MG Tab Take 1 tablet by mouth daily (with dinner). 90 tablet 3   • rosuvastatin (CRESTOR) 5 MG tablet Take 5 mg by mouth daily.     • lisinopril-hydroCHLOROthiazide (ZESTORETIC) 20-12.5 MG per tablet Take 1 tablet by mouth daily. 60 tablet 3   • sotalol (BETAPACE) 80 MG tablet Take 0.5 tablets by mouth 2 times daily.       No current facility-administered medications for this visit.          Demarco Lund DO, MS, FACC, [facility]  Interventional Cardiology  [facility]

## 2023-03-31 NOTE — ASSESSMENT
[FreeTextEntry1] : 52 year old female, BRCA1 positive, with stage IA poorly differentiated IDC of right breast (T1cN0,  ER 90% [place] 50%, HER-2 negative) s/p bilateral mastectomy on 12/19/18.  Oncotype Dx 20 which is intermediate risk. Based on Tailor Rx / Oncotype Dx 20, her absolute benefit from chemotherapy is 1.6% and the risks likely outweigh the benefits. Tamoxifen instead risk of distance recurrence at 9 years is 6%.   She started Tamoxifen on 2/3/19\par S/p ANIVAL/BSO on 2/24/20.\par Discontinued Tamoxifen in 11/2020 due to hot flashes, feeling foggy, multiple joint pains, anxiety.  \par Started on Letrozole in 2/2021\par Letrozole discontinued 4/16/21 due to multiple side effects: nausea, dizziness, ringing in ear, joint pains.\par Took Anastrazole 6/1/21 - 10/8/21 and then stopped due to MSK symptoms - joint pains, fatigue\par Started Exemestane on 1/13/22\par Poor tolerance to Gabapentin.\par Previously, arthralgias had not worsened on exemestane and tolerated cymbalta 30 mg QHS better. \par Self-stopped exemestane 9/2022\par \par \par Declines endocrine therapy at this time. Has tried Tamoxifen and 3 AIs but has had poor tolerance.  \par \par \par Plan:\par Mid-lower back pain - Bone scan ordered \par Follow up in 6 months \par \par

## 2023-04-10 ENCOUNTER — APPOINTMENT (OUTPATIENT)
Dept: HEMATOLOGY ONCOLOGY | Facility: CLINIC | Age: 53
End: 2023-04-10

## 2023-04-12 ENCOUNTER — OUTPATIENT (OUTPATIENT)
Dept: OUTPATIENT SERVICES | Facility: HOSPITAL | Age: 53
LOS: 1 days | End: 2023-04-12

## 2023-04-12 ENCOUNTER — APPOINTMENT (OUTPATIENT)
Dept: NUCLEAR MEDICINE | Facility: CLINIC | Age: 53
End: 2023-04-12
Payer: MEDICAID

## 2023-04-12 DIAGNOSIS — C50.911 MALIGNANT NEOPLASM OF UNSPECIFIED SITE OF RIGHT FEMALE BREAST: ICD-10-CM

## 2023-04-12 DIAGNOSIS — Z90.13 ACQUIRED ABSENCE OF BILATERAL BREASTS AND NIPPLES: Chronic | ICD-10-CM

## 2023-04-12 DIAGNOSIS — Z98.890 OTHER SPECIFIED POSTPROCEDURAL STATES: Chronic | ICD-10-CM

## 2023-04-12 DIAGNOSIS — Z98.82 BREAST IMPLANT STATUS: Chronic | ICD-10-CM

## 2023-04-12 DIAGNOSIS — Z90.710 ACQUIRED ABSENCE OF BOTH CERVIX AND UTERUS: Chronic | ICD-10-CM

## 2023-04-12 DIAGNOSIS — Z90.49 ACQUIRED ABSENCE OF OTHER SPECIFIED PARTS OF DIGESTIVE TRACT: Chronic | ICD-10-CM

## 2023-04-12 PROCEDURE — 78306 BONE IMAGING WHOLE BODY: CPT | Mod: 26

## 2023-04-13 ENCOUNTER — LABORATORY RESULT (OUTPATIENT)
Age: 53
End: 2023-04-13

## 2023-08-09 NOTE — ASU PREOP CHECKLIST - ORDERS/MEDICATION ADMINISTRATION RECORD ON CHART
Patient's preferred language is [place]. [facility] remote interpretor service utilized for discharge teaching. (Interpretor ID:  #[id number] )     Patient ambulatory from ED. AVS provided and discussed with patient. All questions answered. Patient verbalizes understanding of discharge instructions. Respirations even and easy. No obvious distress at this time. Patient educated on signs and symptoms of infection, such as fever, swelling, increased pain, drainage, increased redness surrounding wound or traveling up the extremity. Patient urged to return to ED if such signs or symptoms or other concerning changes are noted. Patient verbalizes understanding.        Sruthi Amador RN  08/09/23 [id number]
done

## 2023-09-21 ENCOUNTER — OUTPATIENT (OUTPATIENT)
Dept: OUTPATIENT SERVICES | Facility: HOSPITAL | Age: 53
LOS: 1 days | Discharge: HOME | End: 2023-09-21

## 2023-09-21 DIAGNOSIS — Z98.890 OTHER SPECIFIED POSTPROCEDURAL STATES: Chronic | ICD-10-CM

## 2023-09-21 DIAGNOSIS — C50.919 MALIGNANT NEOPLASM OF UNSPECIFIED SITE OF UNSPECIFIED FEMALE BREAST: ICD-10-CM

## 2023-09-21 DIAGNOSIS — Z90.49 ACQUIRED ABSENCE OF OTHER SPECIFIED PARTS OF DIGESTIVE TRACT: Chronic | ICD-10-CM

## 2023-09-21 DIAGNOSIS — Z90.710 ACQUIRED ABSENCE OF BOTH CERVIX AND UTERUS: Chronic | ICD-10-CM

## 2023-09-21 DIAGNOSIS — Z90.13 ACQUIRED ABSENCE OF BILATERAL BREASTS AND NIPPLES: Chronic | ICD-10-CM

## 2023-09-21 DIAGNOSIS — Z98.82 BREAST IMPLANT STATUS: Chronic | ICD-10-CM

## 2023-09-25 ENCOUNTER — RESULT REVIEW (OUTPATIENT)
Age: 53
End: 2023-09-25

## 2023-09-25 ENCOUNTER — APPOINTMENT (OUTPATIENT)
Dept: HEMATOLOGY ONCOLOGY | Facility: CLINIC | Age: 53
End: 2023-09-25
Payer: MEDICAID

## 2023-09-25 VITALS
WEIGHT: 131.59 LBS | OXYGEN SATURATION: 99 % | BODY MASS INDEX: 23.32 KG/M2 | HEART RATE: 80 BPM | HEIGHT: 63 IN | SYSTOLIC BLOOD PRESSURE: 136 MMHG | DIASTOLIC BLOOD PRESSURE: 76 MMHG | TEMPERATURE: 98.1 F

## 2023-09-25 LAB
BASOPHILS # BLD AUTO: 0.1 K/UL — SIGNIFICANT CHANGE UP (ref 0–0.2)
BASOPHILS NFR BLD AUTO: 1.7 % — SIGNIFICANT CHANGE UP (ref 0–2)
EOSINOPHIL # BLD AUTO: 0 K/UL — SIGNIFICANT CHANGE UP (ref 0–0.5)
EOSINOPHIL NFR BLD AUTO: 1 % — SIGNIFICANT CHANGE UP (ref 0–6)
HCT VFR BLD CALC: 43.9 % — SIGNIFICANT CHANGE UP (ref 34.5–45)
HGB BLD-MCNC: 14.5 G/DL — SIGNIFICANT CHANGE UP (ref 11.5–15.5)
LYMPHOCYTES # BLD AUTO: 1.6 K/UL — SIGNIFICANT CHANGE UP (ref 1–3.3)
LYMPHOCYTES # BLD AUTO: 36.4 % — SIGNIFICANT CHANGE UP (ref 13–44)
MCHC RBC-ENTMCNC: 31.9 PG — SIGNIFICANT CHANGE UP (ref 27–34)
MCHC RBC-ENTMCNC: 33 G/DL — SIGNIFICANT CHANGE UP (ref 32–36)
MCV RBC AUTO: 96.8 FL — SIGNIFICANT CHANGE UP (ref 80–100)
MONOCYTES # BLD AUTO: 0.4 K/UL — SIGNIFICANT CHANGE UP (ref 0–0.9)
MONOCYTES NFR BLD AUTO: 10 % — SIGNIFICANT CHANGE UP (ref 2–14)
NEUTROPHILS # BLD AUTO: 2.2 K/UL — SIGNIFICANT CHANGE UP (ref 1.8–7.4)
NEUTROPHILS NFR BLD AUTO: 51 % — SIGNIFICANT CHANGE UP (ref 43–77)
PLATELET # BLD AUTO: 245 K/UL — SIGNIFICANT CHANGE UP (ref 150–400)
RBC # BLD: 4.53 M/UL — SIGNIFICANT CHANGE UP (ref 3.8–5.2)
RBC # FLD: 11.7 % — SIGNIFICANT CHANGE UP (ref 10.3–14.5)
WBC # BLD: 4.4 K/UL — SIGNIFICANT CHANGE UP (ref 3.8–10.5)
WBC # FLD AUTO: 4.4 K/UL — SIGNIFICANT CHANGE UP (ref 3.8–10.5)

## 2023-09-25 PROCEDURE — 99214 OFFICE O/P EST MOD 30 MIN: CPT

## 2023-09-25 RX ORDER — DEXTROAMPHETAMINE SACCHARATE, AMPHETAMINE ASPARTATE, DEXTROAMPHETAMINE SULFATE, AND AMPHETAMINE SULFATE 1.25; 1.25; 1.25; 1.25 MG/1; MG/1; MG/1; MG/1
5 TABLET ORAL
Refills: 0 | Status: DISCONTINUED | COMMUNITY
Start: 2022-01-12 | End: 2023-09-25

## 2023-09-25 RX ORDER — MELOXICAM 15 MG/1
15 TABLET ORAL
Qty: 90 | Refills: 0 | Status: DISCONTINUED | COMMUNITY
Start: 2020-10-22 | End: 2023-09-25

## 2023-09-25 RX ORDER — POLYETHYLENE GLYCOL-3350 AND ELECTROLYTES WITH FLAVOR PACK 240; 5.84; 2.98; 6.72; 22.72 G/278.26G; G/278.26G; G/278.26G; G/278.26G; G/278.26G
240 POWDER, FOR SOLUTION ORAL
Qty: 1 | Refills: 0 | Status: DISCONTINUED | COMMUNITY
Start: 2021-10-07 | End: 2023-09-25

## 2023-09-25 RX ORDER — DULOXETINE HYDROCHLORIDE 30 MG/1
30 CAPSULE, DELAYED RELEASE PELLETS ORAL
Qty: 30 | Refills: 1 | Status: DISCONTINUED | COMMUNITY
Start: 2021-09-10 | End: 2023-09-25

## 2023-09-25 RX ORDER — DULOXETINE HYDROCHLORIDE 60 MG/1
60 CAPSULE, DELAYED RELEASE PELLETS ORAL
Qty: 30 | Refills: 2 | Status: DISCONTINUED | COMMUNITY
Start: 2022-03-11 | End: 2023-09-25

## 2023-09-25 RX ORDER — EXEMESTANE 25 MG/1
25 TABLET, FILM COATED ORAL
Qty: 30 | Refills: 11 | Status: DISCONTINUED | COMMUNITY
Start: 2022-01-12 | End: 2023-09-25

## 2023-09-25 RX ORDER — CHOLECALCIFEROL (VITAMIN D3) 10(400)/ML
160 (50 FE) DROPS ORAL DAILY
Qty: 30 | Refills: 0 | Status: DISCONTINUED | COMMUNITY
Start: 2021-09-10 | End: 2023-09-25

## 2023-09-25 RX ORDER — ALPRAZOLAM 0.5 MG/1
0.5 TABLET ORAL
Qty: 60 | Refills: 0 | Status: DISCONTINUED | COMMUNITY
Start: 2020-06-01 | End: 2023-09-25

## 2023-09-27 LAB
25(OH)D3 SERPL-MCNC: 32.2 NG/ML
ALBUMIN SERPL ELPH-MCNC: 4.8 G/DL
ALP BLD-CCNC: 123 U/L
ALT SERPL-CCNC: 24 U/L
ANION GAP SERPL CALC-SCNC: 11 MMOL/L
AST SERPL-CCNC: 17 U/L
BILIRUB SERPL-MCNC: 0.2 MG/DL
BUN SERPL-MCNC: 13 MG/DL
CALCIUM SERPL-MCNC: 10 MG/DL
CHLORIDE SERPL-SCNC: 105 MMOL/L
CO2 SERPL-SCNC: 26 MMOL/L
CREAT SERPL-MCNC: 0.64 MG/DL
EGFR: 106 ML/MIN/1.73M2
GLUCOSE SERPL-MCNC: 119 MG/DL
POTASSIUM SERPL-SCNC: 3.9 MMOL/L
PROT SERPL-MCNC: 7 G/DL
SODIUM SERPL-SCNC: 142 MMOL/L

## 2023-09-28 NOTE — REASON FOR VISIT
[Post Op] : post op visit [de-identified] : 2/24/20 [de-identified] : TRH, BSO [de-identified] : Pt presents for post-op check. She reports doing well, but would love to go back to the gym. She has no nausea/vomiting, no fevers/chills. Clindamycin Counseling: I counseled the patient regarding use of clindamycin as an antibiotic for prophylactic and/or therapeutic purposes. Clindamycin is active against numerous classes of bacteria, including skin bacteria. Side effects may include nausea, diarrhea, gastrointestinal upset, rash, hives, yeast infections, and in rare cases, colitis. Odomzo Counseling- I discussed with the patient the risks of Odomzo including but not limited to nausea, vomiting, diarrhea, constipation, weight loss, changes in the sense of taste, decreased appetite, muscle spasms, and hair loss.  The patient verbalized understanding of the proper use and possible adverse effects of Odomzo.  All of the patient's questions and concerns were addressed.

## 2024-03-14 ENCOUNTER — OUTPATIENT (OUTPATIENT)
Dept: OUTPATIENT SERVICES | Facility: HOSPITAL | Age: 54
LOS: 1 days | Discharge: HOME | End: 2024-03-14

## 2024-03-14 DIAGNOSIS — Z90.710 ACQUIRED ABSENCE OF BOTH CERVIX AND UTERUS: Chronic | ICD-10-CM

## 2024-03-14 DIAGNOSIS — C50.919 MALIGNANT NEOPLASM OF UNSPECIFIED SITE OF UNSPECIFIED FEMALE BREAST: ICD-10-CM

## 2024-03-14 DIAGNOSIS — Z98.890 OTHER SPECIFIED POSTPROCEDURAL STATES: Chronic | ICD-10-CM

## 2024-03-14 DIAGNOSIS — Z98.82 BREAST IMPLANT STATUS: Chronic | ICD-10-CM

## 2024-03-14 DIAGNOSIS — Z90.49 ACQUIRED ABSENCE OF OTHER SPECIFIED PARTS OF DIGESTIVE TRACT: Chronic | ICD-10-CM

## 2024-03-14 DIAGNOSIS — Z90.13 ACQUIRED ABSENCE OF BILATERAL BREASTS AND NIPPLES: Chronic | ICD-10-CM

## 2024-03-21 ENCOUNTER — APPOINTMENT (OUTPATIENT)
Dept: HEMATOLOGY ONCOLOGY | Facility: CLINIC | Age: 54
End: 2024-03-21
Payer: MEDICAID

## 2024-03-21 VITALS
WEIGHT: 132 LBS | DIASTOLIC BLOOD PRESSURE: 82 MMHG | SYSTOLIC BLOOD PRESSURE: 124 MMHG | BODY MASS INDEX: 23.39 KG/M2 | OXYGEN SATURATION: 99 % | HEIGHT: 63 IN | HEART RATE: 90 BPM

## 2024-03-21 DIAGNOSIS — C50.911 MALIGNANT NEOPLASM OF UNSPECIFIED SITE OF RIGHT FEMALE BREAST: ICD-10-CM

## 2024-03-21 PROCEDURE — 99214 OFFICE O/P EST MOD 30 MIN: CPT

## 2024-03-21 PROCEDURE — G2211 COMPLEX E/M VISIT ADD ON: CPT | Mod: NC,1L

## 2024-03-21 NOTE — PHYSICAL EXAM
[Normal] : affect appropriate [de-identified] : b/l reconstructed breasts, no palpable mass or adenopathy

## 2024-03-21 NOTE — ASSESSMENT
[FreeTextEntry1] : 53 year old female, BRCA1 positive, with stage IA poorly differentiated IDC of right breast (T1cN0,  ER 90% [place] 50%, HER-2 negative) s/p bilateral mastectomy on 12/19/18.  Oncotype Dx 20 which is intermediate risk. Based on Tailor Rx / Oncotype Dx 20, her absolute benefit from chemotherapy is 1.6% and the risks likely outweigh the benefits. Tamoxifen instead risk of distance recurrence at 9 years is 6%.   She started Tamoxifen on 2/3/19 S/p ANIVAL/BSO on 2/24/20. Discontinued Tamoxifen in 11/2020 due to hot flashes, feeling foggy, multiple joint pains, anxiety.   Started on Letrozole in 2/2021 Letrozole discontinued 4/16/21 due to multiple side effects: nausea, dizziness, ringing in ear, joint pains. Took Anastrazole 6/1/21 - 10/8/21 and then stopped due to MSK symptoms - joint pains, fatigue S/p Exemestane on 1/13/22 -9/20/22.   Poor tolerance to endocrine therapy. Has tried Tamoxifen and 3 AIs but has had poor tolerance and declined further treatment.  Plan: Follow up with Dr. Gupta for polyarthralgia that is ongoing Follow up in 6 months.

## 2024-03-21 NOTE — ADDENDUM
[FreeTextEntry1] :  Documented by Eva Murguia acting as scribe for Dr. Goodwin on  03/21/2024.   All Medical record entries made by the Scribe were at my, Dr. Goodwin's, direction and personally dictated by me on  03/21/2024. I have reviewed the chart and agree that the record accurately reflects my personal performance of the history, physical exam, assessment and plan. I have also personally directed, reviewed, and agreed with the discharge instructions.

## 2024-03-21 NOTE — HISTORY OF PRESENT ILLNESS
[Disease: _____________________] : Disease: [unfilled] [T: ___] : T[unfilled] [M: ___] : M[unfilled] [N: ___] : N[unfilled] [AJCC Stage: ____] : AJCC Stage: [unfilled] [de-identified] : \par  Ms. Hardin was diagnosed with right breast IDC at the age of 48 in December 2018. Given positive family history, she tested positive for a BRCA1 mutation in 9/2018.\par  The patient underwent screening imaging on 10/1/18 which only showed minimal bilateral fibrocystic nodularity but was otherwise a negative bilateral breast US. She then underwent an MRI on 10/26/18 which demonstrated a right upper inner quadrant irregular enhancing nodule and a second US with US biopsy was advised. \par  \par  An US guided biopsy was performed on 11/8/18 and pathology revealed infiltrative ductal carcinoma with no definitive lymphovascular invasion and no in situ component identified. [place] score 7/9, ER 90% SD 50%, HER-2 negative. \par  \par  She is s/p bilateral mastectomy and sentinel lymph node biopsy on 12/19/18 with Dr. Vidales. \par  Pathology:\par  Right breast - poorly differentiated infiltrative ductal carcinoma, [place] score of 8/9, 1.7 cm. There was no definitive lymphovascular invasion or in situ component identified. The tumor showed brisk lymphocytic response and margins were negative. The skeletal muscle was negative for malignancy. The 3 lymph nodes were negative for tumor. pT1c N0\par  Left breast - negative for malignancy but had focal usual ductal hyperplasia and nodular dense fibrosis. \par  \par  Her Oncotype score is 20 and she is BRCA1 positive [id number] pathogenic mutation.\par  \par  She is otherwise healthy and denies any other medical problems. Menstrual period has been irregular, with LMP in December 2018. \par  \par  FMHx: breast cancer in paternal aunt and grandmother\par  Social Hx: 8 year smoking history but quit 22 years ago, occasional drinker, 2 children and 1 stepchild (all girls, ages 17-22), . \par  \par  Disease: right breast cancer \par  Pathology: poorly differentiated IDC \par  TNM stage: T1c, N0, Mx \par  AJCC Stage: IA \par  Tumor Markers: ER 90% SD 50%, HER-2 negative \par  \par  She started Tamoxifen on 2/3/20, but has discontinued secondary to inumerable side effects.\par  \par  Care Team\par  PCP, Dr. Meagan Keller MD\par   [de-identified] : poorly differentiated IDC [de-identified] :  ER 90% [place] 50%, HER-2 negative [de-identified] : Returns for follow up visit She is feeling well Denies weight loss and pain Denies N/V  Has intermittent headaches unchanged from baseline Last colonoscopy 3-4 years ago Reports constant joint pain, feels stiff most days.  Has stiffness in joints especially when she wakes up

## 2024-03-27 NOTE — H&P PST ADULT - NSANTHBPHIGHRD_ENT_A_CORE
[facility]   Physical Therapy Treatment Note      Name: Shanthi Escoto  Clinic Number: [id number]    Therapy Diagnosis:   Encounter Diagnoses   Name Primary?    S/P lumbar spinal fusion Yes    Decreased range of motion     Muscle tightness        Physician: Dennis Flores MD    Visit Date: 3/27/2024    Physician: Dennis Flores MD         Physician Orders: PT Eval and Treat  Medical Diagnosis from Referral: Z98.1 (ICD-10-CM) - S/P lumbar spinal fusion  Evaluation Date: 12/6/2023  Authorization Period Expiration: 12/29/2023  Plan of Care Expiration: 3/30/2024                Progress Update: 1/6/2024               FOTO: 1 / 3   Visit # / Visits authorized: 11 / 20                          PRECAUTIONS: Standard Precautions and Orthopedic: Lumbar Fusion precautions      Time In: 1600   (Pnt arrived early)  Time Out: 1654  Total Appointment Time (timed & untimed codes): 54 minutes      Subjective     Patient reports: that she feels ok today, no complaints.  She was compliant with home exercise program.  Response to previous treatment: no issues   Functional change: not wearing the brace anymore     Pain: 3/10  Location: bilateral back      Objective      Objective Measures updated at progress report unless specified.     Treatment     Shanthi received the treatments listed below:      therapeutic exercises to develop strength, endurance, ROM, flexibility, posture, and core stabilization for 8 minutes including:    LAQs  3#  2x10 Bilateral    SAQs  4#  2x10 Bilateral     neuromuscular re-education activities to improve: Posture for 38 minutes. The following activities were included:    Nustep x 15 mins  TrA sets with Physioball  2x10     SLRs  3x10 Bilateral   Clamshell green theraband 3x10      Seated HR/TR   3x10   Hip adduction with ball  3x10     UBE 3/3  Standing gastroc stretch 2x30 secs Bilateral     therapeutic activities to improve functional performance for 8 minutes,  including:    Single Leg Stance 3 x 20 sec Bilateral - np    Scapular squeezes  3x10  OH pulleys   (flexion/abduction)  x 3 mins each    Rows with red tb  2 x 10 - np  Standing hip abduction with support x 10 Bilateral  - np      Seated marches 3#  x 20 Bilateral        Patient Education and Home Exercises       Education provided:   - home exercises, posture, exercise tolerance     Written Home Exercises Provided: Patient instructed to cont prior HEP. Exercises were reviewed and Shanthi was able to demonstrate them prior to the end of the session.  Shanthi demonstrated good  understanding of the education provided. See Electronic Medical Record under Patient Instructions for exercises provided during therapy sessions    Assessment     Shanthi presents with decreased endurance and unstable gait without cane. Modified session due to overall fatigue, but good effort provided in shortened time. Standby assistance during gait within clinic per safety. Progress as able.     Shanthi Is progressing well towards her goals.   Patient prognosis is Good.     Patient will continue to benefit from skilled outpatient physical therapy to address the deficits listed in the problem list box on initial evaluation, provide pt/family education and to maximize pt's level of independence in the home and community environment.     Patient's spiritual, cultural and educational needs considered and pt agreeable to plan of care and goals.     Anticipated barriers to physical therapy: none     Goals:   SHORT TERM GOALS: 4 weeks 3/27/2024     Recent signs and systems trend is improving in order to progress towards LTG's.  Ongoing   Patient will be independent with HEP in order to further progress and return to maximal function.  Ongoing   Pain rating at Worst: 5/10 in order to progress towards increased independence with activity.  Ongoing   Patient will be able to correct postural deviations in sitting and standing, to decrease pain and  promote postural awareness for injury prevention.   Ongoing      LONG TERM GOALS: 8 weeks 3/27/2024     Patient will return to normal ADL, recreational, and work related activities with less pain and limitation.   Ongoing   Patient will improve AROM to stated goals in order to return to maximal functional potential.   Ongoing   Patient will improve Strength to stated goals of appropriate musculature in order to improve functional independence.   Ongoing   Pain Rating at Best: 1/10 to improve Quality of Life.   Ongoing   Patient will meet predicted functional outcome (FOTO) score: 80% to increase self-worth & perceived functional ability.  Ongoing   Patient will have met/partially met personal goal of being able to walk with no pain.  Ongoing            Plan     Continue per Plan of Care     Hilario Ellis, PT        No

## 2024-08-29 ENCOUNTER — OUTPATIENT (OUTPATIENT)
Dept: OUTPATIENT SERVICES | Facility: HOSPITAL | Age: 54
LOS: 1 days | Discharge: HOME | End: 2024-08-29

## 2024-08-29 DIAGNOSIS — C50.919 MALIGNANT NEOPLASM OF UNSPECIFIED SITE OF UNSPECIFIED FEMALE BREAST: ICD-10-CM

## 2024-08-29 DIAGNOSIS — Z98.82 BREAST IMPLANT STATUS: Chronic | ICD-10-CM

## 2024-08-29 DIAGNOSIS — Z98.890 OTHER SPECIFIED POSTPROCEDURAL STATES: Chronic | ICD-10-CM

## 2024-08-29 DIAGNOSIS — Z90.710 ACQUIRED ABSENCE OF BOTH CERVIX AND UTERUS: Chronic | ICD-10-CM

## 2024-08-29 DIAGNOSIS — Z90.49 ACQUIRED ABSENCE OF OTHER SPECIFIED PARTS OF DIGESTIVE TRACT: Chronic | ICD-10-CM

## 2024-08-29 DIAGNOSIS — Z90.13 ACQUIRED ABSENCE OF BILATERAL BREASTS AND NIPPLES: Chronic | ICD-10-CM

## 2024-09-10 ENCOUNTER — RESULT REVIEW (OUTPATIENT)
Age: 54
End: 2024-09-10

## 2024-09-10 ENCOUNTER — APPOINTMENT (OUTPATIENT)
Dept: HEMATOLOGY ONCOLOGY | Facility: CLINIC | Age: 54
End: 2024-09-10
Payer: MEDICAID

## 2024-09-10 VITALS
WEIGHT: 129.19 LBS | DIASTOLIC BLOOD PRESSURE: 79 MMHG | TEMPERATURE: 97.2 F | OXYGEN SATURATION: 100 % | SYSTOLIC BLOOD PRESSURE: 132 MMHG | BODY MASS INDEX: 22.89 KG/M2 | HEART RATE: 96 BPM | RESPIRATION RATE: 16 BRPM | HEIGHT: 63 IN

## 2024-09-10 DIAGNOSIS — R59.0 LOCALIZED ENLARGED LYMPH NODES: ICD-10-CM

## 2024-09-10 DIAGNOSIS — C50.911 MALIGNANT NEOPLASM OF UNSPECIFIED SITE OF RIGHT FEMALE BREAST: ICD-10-CM

## 2024-09-10 DIAGNOSIS — R92.8 OTHER ABNORMAL AND INCONCLUSIVE FINDINGS ON DIAGNOSTIC IMAGING OF BREAST: ICD-10-CM

## 2024-09-10 DIAGNOSIS — N64.59 OTHER SIGNS AND SYMPTOMS IN BREAST: ICD-10-CM

## 2024-09-10 LAB
BASOPHILS # BLD AUTO: 0.1 K/UL — SIGNIFICANT CHANGE UP (ref 0–0.2)
BASOPHILS NFR BLD AUTO: 2.1 % — HIGH (ref 0–2)
EOSINOPHIL # BLD AUTO: 0.1 K/UL — SIGNIFICANT CHANGE UP (ref 0–0.5)
EOSINOPHIL NFR BLD AUTO: 2.4 % — SIGNIFICANT CHANGE UP (ref 0–6)
HCT VFR BLD CALC: 46.5 % — HIGH (ref 34.5–45)
HGB BLD-MCNC: 15.4 G/DL — SIGNIFICANT CHANGE UP (ref 11.5–15.5)
LYMPHOCYTES # BLD AUTO: 1.8 K/UL — SIGNIFICANT CHANGE UP (ref 1–3.3)
LYMPHOCYTES # BLD AUTO: 39.1 % — SIGNIFICANT CHANGE UP (ref 13–44)
MCHC RBC-ENTMCNC: 31.9 PG — SIGNIFICANT CHANGE UP (ref 27–34)
MCHC RBC-ENTMCNC: 33 G/DL — SIGNIFICANT CHANGE UP (ref 32–36)
MCV RBC AUTO: 96.6 FL — SIGNIFICANT CHANGE UP (ref 80–100)
MONOCYTES # BLD AUTO: 0.4 K/UL — SIGNIFICANT CHANGE UP (ref 0–0.9)
MONOCYTES NFR BLD AUTO: 7.8 % — SIGNIFICANT CHANGE UP (ref 2–14)
NEUTROPHILS # BLD AUTO: 2.2 K/UL — SIGNIFICANT CHANGE UP (ref 1.8–7.4)
NEUTROPHILS NFR BLD AUTO: 48.6 % — SIGNIFICANT CHANGE UP (ref 43–77)
PLATELET # BLD AUTO: 262 K/UL — SIGNIFICANT CHANGE UP (ref 150–400)
RBC # BLD: 4.82 M/UL — SIGNIFICANT CHANGE UP (ref 3.8–5.2)
RBC # FLD: 11.2 % — SIGNIFICANT CHANGE UP (ref 10.3–14.5)
WBC # BLD: 4.5 K/UL — SIGNIFICANT CHANGE UP (ref 3.8–10.5)
WBC # FLD AUTO: 4.5 K/UL — SIGNIFICANT CHANGE UP (ref 3.8–10.5)

## 2024-09-10 PROCEDURE — G2211 COMPLEX E/M VISIT ADD ON: CPT | Mod: NC

## 2024-09-10 PROCEDURE — 99214 OFFICE O/P EST MOD 30 MIN: CPT

## 2024-09-11 PROBLEM — R59.0 AXILLARY LYMPHADENOPATHY: Status: ACTIVE | Noted: 2024-09-11

## 2024-09-11 PROBLEM — R92.8 ABNORMAL FINDING ON BREAST IMAGING: Status: ACTIVE | Noted: 2024-09-11

## 2024-09-11 LAB
ALBUMIN SERPL ELPH-MCNC: 5 G/DL
ALP BLD-CCNC: 111 U/L
ALT SERPL-CCNC: 25 U/L
ANION GAP SERPL CALC-SCNC: 12 MMOL/L
AST SERPL-CCNC: 20 U/L
BILIRUB SERPL-MCNC: 0.3 MG/DL
BUN SERPL-MCNC: 12 MG/DL
CALCIUM SERPL-MCNC: 10.1 MG/DL
CHLORIDE SERPL-SCNC: 101 MMOL/L
CO2 SERPL-SCNC: 25 MMOL/L
CREAT SERPL-MCNC: 0.73 MG/DL
EGFR: 98 ML/MIN/1.73M2
FERRITIN SERPL-MCNC: 68 NG/ML
FOLATE SERPL-MCNC: 13.7 NG/ML
GLUCOSE SERPL-MCNC: 102 MG/DL
IRON SATN MFR SERPL: 22 %
IRON SERPL-MCNC: 82 UG/DL
POTASSIUM SERPL-SCNC: 4.4 MMOL/L
PROT SERPL-MCNC: 7.6 G/DL
SODIUM SERPL-SCNC: 139 MMOL/L
TIBC SERPL-MCNC: 382 UG/DL
UIBC SERPL-MCNC: 300 UG/DL
VIT B12 SERPL-MCNC: 749 PG/ML

## 2024-09-11 NOTE — ADDENDUM
[FreeTextEntry1] : Documented by Ender Medley acting as scribe for Dr. Goodwin on 09/10/2024.   All Medical record entries made by the Scribe were at my, Dr. Goodwin's, direction and personally dictated by me on 09/10/2024. I have reviewed the chart and agree that the record accurately reflects my personal performance of the history, physical exam, assessment and plan. I have also personally directed, reviewed, and agreed with the discharge instructions.

## 2024-09-11 NOTE — HISTORY OF PRESENT ILLNESS
[Disease: _____________________] : Disease: [unfilled] [T: ___] : T[unfilled] [N: ___] : N[unfilled] [M: ___] : M[unfilled] [AJCC Stage: ____] : AJCC Stage: [unfilled] [de-identified] : Ms. Hardin was diagnosed with right breast IDC at the age of 48 in December 2018. Given positive family history, she tested positive for a BRCA1 mutation in 9/2018. The patient underwent screening imaging on 10/1/18 which only showed minimal bilateral fibrocystic nodularity but was otherwise a negative bilateral breast US. She then underwent an MRI on 10/26/18 which demonstrated a right upper inner quadrant irregular enhancing nodule and a second US with US biopsy was advised.   An US guided biopsy was performed on 11/8/18 and pathology revealed infiltrative ductal carcinoma with no definitive lymphovascular invasion and no in situ component identified. [place] score 7/9, ER 90% ID 50%, HER-2 negative.   She is s/p bilateral mastectomy and sentinel lymph node biopsy on 12/19/18 with Dr. Vidales.  Pathology: Right breast - poorly differentiated infiltrative ductal carcinoma, [place] score of 8/9, 1.7 cm. There was no definitive lymphovascular invasion or in situ component identified. The tumor showed brisk lymphocytic response and margins were negative. The skeletal muscle was negative for malignancy. The 3 lymph nodes were negative for tumor. pT1c N0 Left breast - negative for malignancy but had focal usual ductal hyperplasia and nodular dense fibrosis.   Her Oncotype score is 20 and she is BRCA1 positive [id number] pathogenic mutation.  She is otherwise healthy and denies any other medical problems. Menstrual period has been irregular, with LMP in December 2018.   FMHx: breast cancer in paternal aunt and grandmother Social Hx: 8 year smoking history but quit 22 years ago, occasional drinker, 2 children and 1 stepchild (all girls, ages 17-22), .   Disease: right breast cancer  Pathology: poorly differentiated IDC  TNM stage: T1c, N0, Mx  AJCC Stage: IA  Tumor Markers: ER 90% ID 50%, HER-2 negative   She started Tamoxifen on 2/3/20, but has discontinued secondary to inumerable side effects.  Care Team PCP: Dr. Meagan Keller MD [de-identified] : poorly differentiated IDC [de-identified] :  ER 90% [place] 50%, HER-2 negative [de-identified] : Returns for follow up visit She is feeling "off" recently. C/o tingling, numbness, and sharp "stabbing" pain in R axilla extending to upper R arm. Describes feeling a ball-shaped lump in R axilla. Denies any sx on LUE.  Reports frequent dizziness. Denies weight loss. Follows regularly w/ PCP, next on 9/18/24. Of notes, she now lives 2.5 hours away in [facility] temporarily but is able to attend appointments, does not need transfer of care at this time.

## 2024-09-11 NOTE — HISTORY OF PRESENT ILLNESS
[Disease: _____________________] : Disease: [unfilled] [T: ___] : T[unfilled] [N: ___] : N[unfilled] [M: ___] : M[unfilled] [AJCC Stage: ____] : AJCC Stage: [unfilled] [de-identified] : Ms. Hardin was diagnosed with right breast IDC at the age of 48 in December 2018. Given positive family history, she tested positive for a BRCA1 mutation in 9/2018. The patient underwent screening imaging on 10/1/18 which only showed minimal bilateral fibrocystic nodularity but was otherwise a negative bilateral breast US. She then underwent an MRI on 10/26/18 which demonstrated a right upper inner quadrant irregular enhancing nodule and a second US with US biopsy was advised.   An US guided biopsy was performed on 11/8/18 and pathology revealed infiltrative ductal carcinoma with no definitive lymphovascular invasion and no in situ component identified. [place] score 7/9, ER 90% [place] 50%, HER-2 negative.   She is s/p bilateral mastectomy and sentinel lymph node biopsy on 12/19/18 with Dr. Vidales.  Pathology: Right breast - poorly differentiated infiltrative ductal carcinoma, [place] score of 8/9, 1.7 cm. There was no definitive lymphovascular invasion or in situ component identified. The tumor showed brisk lymphocytic response and margins were negative. The skeletal muscle was negative for malignancy. The 3 lymph nodes were negative for tumor. pT1c N0 Left breast - negative for malignancy but had focal usual ductal hyperplasia and nodular dense fibrosis.   Her Oncotype score is 20 and she is BRCA1 positive [id number] pathogenic mutation.  She is otherwise healthy and denies any other medical problems. Menstrual period has been irregular, with LMP in December 2018.   FMHx: breast cancer in paternal aunt and grandmother Social Hx: 8 year smoking history but quit 22 years ago, occasional drinker, 2 children and 1 stepchild (all girls, ages 17-22), .   Disease: right breast cancer  Pathology: poorly differentiated IDC  TNM stage: T1c, N0, Mx  AJCC Stage: IA  Tumor Markers: ER 90% TN 50%, HER-2 negative   She started Tamoxifen on 2/3/20, but has discontinued secondary to inumerable side effects.  Care Team PCP: Dr. Meagan Keller MD [de-identified] : poorly differentiated IDC [de-identified] :  ER 90% [place] 50%, HER-2 negative [de-identified] : Returns for follow up visit She is feeling "off" recently. C/o tingling, numbness, and sharp "stabbing" pain in R axilla extending to upper R arm. Describes feeling a ball-shaped lump in R axilla. Denies any sx on LUE.  Reports frequent dizziness. Denies weight loss. Follows regularly w/ PCP, next on 9/18/24. Of notes, she now lives 2.5 hours away in [facility] temporarily but is able to attend appointments, does not need transfer of care at this time.

## 2024-09-11 NOTE — PHYSICAL EXAM
[Normal] : PERRL, EOMI, no conjunctival infection, anicteric [de-identified] : b/l reconstructed breasts, palpable R axillary lymph nodes high up in the axilla

## 2024-09-11 NOTE — PHYSICAL EXAM
[Normal] : PERRL, EOMI, no conjunctival infection, anicteric [de-identified] : b/l reconstructed breasts, palpable R axillary lymph nodes high up in the axilla

## 2024-09-11 NOTE — ASSESSMENT
[FreeTextEntry1] : 53 year old female, BRCA1 positive, with stage IA poorly differentiated IDC of right breast (T1cN0,  ER 90% [place] 50%, HER-2 negative) s/p bilateral mastectomy on 12/19/18.  Oncotype Dx 20 which is intermediate risk. Based on [employer] / Oncotype Dx 20, her absolute benefit from chemotherapy is 1.6%. DRR 6% with Pimentel. She started Tamoxifen on 2/3/19 S/p ANIVAL/BSO on 2/24/20. Discontinued Tamoxifen in 11/2020 due to hot flashes, feeling foggy, multiple joint pains, anxiety.   Started on Letrozole in 2/2021 Letrozole discontinued 4/16/21 due to multiple side effects: nausea, dizziness, ringing in ear, joint pains. Took Anastrazole 6/1/21 - 10/8/21 and then stopped due to MSK symptoms - joint pains, fatigue S/p Exemestane on 1/13/22 -9/20/22. Poor tolerance to endocrine therapy. She had tried Tamoxifen and 3 AIs but has had poor tolerance and declined further treatment.  Now with palpable lymph node in the right axilla  Plan: -US right axilla asap, may need a biopsy -Follow up with Dr. Gupta for polyarthralgia that is ongoing -labs today  -will call with results of US

## 2024-09-19 ENCOUNTER — NON-APPOINTMENT (OUTPATIENT)
Age: 54
End: 2024-09-19

## 2024-10-07 ENCOUNTER — APPOINTMENT (OUTPATIENT)
Dept: HEMATOLOGY ONCOLOGY | Facility: CLINIC | Age: 54
End: 2024-10-07
Payer: MEDICAID

## 2024-10-07 VITALS
TEMPERATURE: 97.9 F | BODY MASS INDEX: 22.86 KG/M2 | OXYGEN SATURATION: 99 % | HEIGHT: 63 IN | WEIGHT: 129 LBS | HEART RATE: 82 BPM | SYSTOLIC BLOOD PRESSURE: 152 MMHG | DIASTOLIC BLOOD PRESSURE: 77 MMHG

## 2024-10-07 DIAGNOSIS — N64.59 OTHER SIGNS AND SYMPTOMS IN BREAST: ICD-10-CM

## 2024-10-07 DIAGNOSIS — R92.8 OTHER ABNORMAL AND INCONCLUSIVE FINDINGS ON DIAGNOSTIC IMAGING OF BREAST: ICD-10-CM

## 2024-10-07 DIAGNOSIS — R59.0 LOCALIZED ENLARGED LYMPH NODES: ICD-10-CM

## 2024-10-07 DIAGNOSIS — C50.911 MALIGNANT NEOPLASM OF UNSPECIFIED SITE OF RIGHT FEMALE BREAST: ICD-10-CM

## 2024-10-07 PROCEDURE — G2211 COMPLEX E/M VISIT ADD ON: CPT | Mod: NC

## 2024-10-07 PROCEDURE — 99214 OFFICE O/P EST MOD 30 MIN: CPT

## 2024-10-09 ENCOUNTER — NON-APPOINTMENT (OUTPATIENT)
Age: 54
End: 2024-10-09

## 2024-10-16 ENCOUNTER — NON-APPOINTMENT (OUTPATIENT)
Age: 54
End: 2024-10-16

## 2024-10-16 RX ORDER — PROCHLORPERAZINE MALEATE 10 MG/1
10 TABLET ORAL EVERY 6 HOURS
Qty: 30 | Refills: 1 | Status: ACTIVE | COMMUNITY
Start: 2024-10-16 | End: 1900-01-01

## 2024-10-16 RX ORDER — ONDANSETRON 8 MG/1
8 TABLET, ORALLY DISINTEGRATING ORAL EVERY 8 HOURS
Qty: 90 | Refills: 1 | Status: ACTIVE | COMMUNITY
Start: 2024-10-16 | End: 1900-01-01

## 2024-10-18 ENCOUNTER — NON-APPOINTMENT (OUTPATIENT)
Age: 54
End: 2024-10-18

## 2024-10-18 VITALS — WEIGHT: 128.97 LBS

## 2024-10-18 RX ORDER — OXYCODONE 5 MG/1
5 TABLET ORAL
Qty: 60 | Refills: 0 | Status: ACTIVE | COMMUNITY
Start: 2024-10-07 | End: 1900-01-01

## 2024-10-21 DIAGNOSIS — C50.911 MALIGNANT NEOPLASM OF UNSPECIFIED SITE OF RIGHT FEMALE BREAST: ICD-10-CM

## 2024-10-23 ENCOUNTER — RESULT REVIEW (OUTPATIENT)
Age: 54
End: 2024-10-23

## 2024-10-23 ENCOUNTER — APPOINTMENT (OUTPATIENT)
Age: 54
End: 2024-10-23

## 2024-10-23 LAB
BASOPHILS # BLD AUTO: 0.1 K/UL — SIGNIFICANT CHANGE UP (ref 0–0.2)
BASOPHILS NFR BLD AUTO: 2.7 % — HIGH (ref 0–2)
EOSINOPHIL # BLD AUTO: 0.1 K/UL — SIGNIFICANT CHANGE UP (ref 0–0.5)
EOSINOPHIL NFR BLD AUTO: 1.9 % — SIGNIFICANT CHANGE UP (ref 0–6)
HCT VFR BLD CALC: 44.6 % — SIGNIFICANT CHANGE UP (ref 34.5–45)
HGB BLD-MCNC: 14.5 G/DL — SIGNIFICANT CHANGE UP (ref 11.5–15.5)
LYMPHOCYTES # BLD AUTO: 1.7 K/UL — SIGNIFICANT CHANGE UP (ref 1–3.3)
LYMPHOCYTES # BLD AUTO: 39.5 % — SIGNIFICANT CHANGE UP (ref 13–44)
MCHC RBC-ENTMCNC: 31.8 PG — SIGNIFICANT CHANGE UP (ref 27–34)
MCHC RBC-ENTMCNC: 32.5 G/DL — SIGNIFICANT CHANGE UP (ref 32–36)
MCV RBC AUTO: 97.9 FL — SIGNIFICANT CHANGE UP (ref 80–100)
MONOCYTES # BLD AUTO: 0.4 K/UL — SIGNIFICANT CHANGE UP (ref 0–0.9)
MONOCYTES NFR BLD AUTO: 8.2 % — SIGNIFICANT CHANGE UP (ref 2–14)
NEUTROPHILS # BLD AUTO: 2 K/UL — SIGNIFICANT CHANGE UP (ref 1.8–7.4)
NEUTROPHILS NFR BLD AUTO: 47.6 % — SIGNIFICANT CHANGE UP (ref 43–77)
PLATELET # BLD AUTO: 319 K/UL — SIGNIFICANT CHANGE UP (ref 150–400)
RBC # BLD: 4.56 M/UL — SIGNIFICANT CHANGE UP (ref 3.8–5.2)
RBC # FLD: 11.8 % — SIGNIFICANT CHANGE UP (ref 10.3–14.5)
WBC # BLD: 4.3 K/UL — SIGNIFICANT CHANGE UP (ref 3.8–10.5)
WBC # FLD AUTO: 4.3 K/UL — SIGNIFICANT CHANGE UP (ref 3.8–10.5)

## 2024-10-24 DIAGNOSIS — R11.2 NAUSEA WITH VOMITING, UNSPECIFIED: ICD-10-CM

## 2024-10-24 DIAGNOSIS — Z51.11 ENCOUNTER FOR ANTINEOPLASTIC CHEMOTHERAPY: ICD-10-CM

## 2024-10-24 LAB
ALBUMIN SERPL ELPH-MCNC: 4.7 G/DL
ALBUMIN SERPL ELPH-MCNC: 4.7 G/DL — SIGNIFICANT CHANGE UP (ref 3.3–5)
ALP BLD-CCNC: 160 U/L
ALP SERPL-CCNC: 159 U/L — HIGH (ref 40–120)
ALT FLD-CCNC: 55 U/L — HIGH (ref 10–45)
ALT SERPL-CCNC: 54 U/L
ANION GAP SERPL CALC-SCNC: 11 MMOL/L
ANION GAP SERPL CALC-SCNC: 17 MMOL/L — SIGNIFICANT CHANGE UP (ref 5–17)
AST SERPL-CCNC: 27 U/L
AST SERPL-CCNC: 31 U/L — SIGNIFICANT CHANGE UP (ref 10–40)
BILIRUB SERPL-MCNC: 0.4 MG/DL
BILIRUB SERPL-MCNC: 0.4 MG/DL — SIGNIFICANT CHANGE UP (ref 0.2–1.2)
BUN SERPL-MCNC: 10 MG/DL — SIGNIFICANT CHANGE UP (ref 7–23)
BUN SERPL-MCNC: 11 MG/DL
CALCIUM SERPL-MCNC: 10.4 MG/DL — SIGNIFICANT CHANGE UP (ref 8.4–10.5)
CALCIUM SERPL-MCNC: 9.6 MG/DL
CHLORIDE SERPL-SCNC: 102 MMOL/L — SIGNIFICANT CHANGE UP (ref 96–108)
CHLORIDE SERPL-SCNC: 99 MMOL/L
CO2 SERPL-SCNC: 22 MMOL/L — SIGNIFICANT CHANGE UP (ref 22–31)
CO2 SERPL-SCNC: 26 MMOL/L
CREAT SERPL-MCNC: 0.63 MG/DL — SIGNIFICANT CHANGE UP (ref 0.5–1.3)
CREAT SERPL-MCNC: 0.73 MG/DL
EGFR: 106 ML/MIN/1.73M2 — SIGNIFICANT CHANGE UP
EGFR: 98 ML/MIN/1.73M2
GLUCOSE SERPL-MCNC: 84 MG/DL — SIGNIFICANT CHANGE UP (ref 70–99)
GLUCOSE SERPL-MCNC: 97 MG/DL
HBV CORE IGG+IGM SER QL: NONREACTIVE
HBV CORE IGM SER QL: NONREACTIVE
HBV SURFACE AB SER QL: NONREACTIVE
HBV SURFACE AG SER QL: NONREACTIVE
HCV AB SER QL: NONREACTIVE
HCV S/CO RATIO: 0.09 S/CO
POTASSIUM SERPL-MCNC: 3.9 MMOL/L — SIGNIFICANT CHANGE UP (ref 3.5–5.3)
POTASSIUM SERPL-SCNC: 3.9 MMOL/L — SIGNIFICANT CHANGE UP (ref 3.5–5.3)
POTASSIUM SERPL-SCNC: 4.3 MMOL/L
PROT SERPL-MCNC: 7.3 G/DL
PROT SERPL-MCNC: 7.8 G/DL — SIGNIFICANT CHANGE UP (ref 6–8.3)
SODIUM SERPL-SCNC: 136 MMOL/L
SODIUM SERPL-SCNC: 140 MMOL/L — SIGNIFICANT CHANGE UP (ref 135–145)

## 2024-10-29 ENCOUNTER — OUTPATIENT (OUTPATIENT)
Dept: OUTPATIENT SERVICES | Facility: HOSPITAL | Age: 54
LOS: 1 days | Discharge: HOME | End: 2024-10-29

## 2024-10-29 DIAGNOSIS — Z90.710 ACQUIRED ABSENCE OF BOTH CERVIX AND UTERUS: Chronic | ICD-10-CM

## 2024-10-29 DIAGNOSIS — Z98.890 OTHER SPECIFIED POSTPROCEDURAL STATES: Chronic | ICD-10-CM

## 2024-10-29 DIAGNOSIS — Z98.82 BREAST IMPLANT STATUS: Chronic | ICD-10-CM

## 2024-10-29 DIAGNOSIS — Z90.13 ACQUIRED ABSENCE OF BILATERAL BREASTS AND NIPPLES: Chronic | ICD-10-CM

## 2024-10-29 DIAGNOSIS — Z90.49 ACQUIRED ABSENCE OF OTHER SPECIFIED PARTS OF DIGESTIVE TRACT: Chronic | ICD-10-CM

## 2024-11-06 ENCOUNTER — RESULT REVIEW (OUTPATIENT)
Age: 54
End: 2024-11-06

## 2024-11-06 ENCOUNTER — APPOINTMENT (OUTPATIENT)
Age: 54
End: 2024-11-06

## 2024-11-06 ENCOUNTER — APPOINTMENT (OUTPATIENT)
Dept: HEMATOLOGY ONCOLOGY | Facility: CLINIC | Age: 54
End: 2024-11-06
Payer: MEDICAID

## 2024-11-06 DIAGNOSIS — Z79.899 OTHER LONG TERM (CURRENT) DRUG THERAPY: ICD-10-CM

## 2024-11-06 DIAGNOSIS — C50.919 MALIGNANT NEOPLASM OF UNSPECIFIED SITE OF UNSPECIFIED FEMALE BREAST: ICD-10-CM

## 2024-11-06 LAB
BASOPHILS # BLD AUTO: 0.1 K/UL — SIGNIFICANT CHANGE UP (ref 0–0.2)
BASOPHILS NFR BLD AUTO: 1.7 % — SIGNIFICANT CHANGE UP (ref 0–2)
EOSINOPHIL # BLD AUTO: 0 K/UL — SIGNIFICANT CHANGE UP (ref 0–0.5)
EOSINOPHIL NFR BLD AUTO: 1 % — SIGNIFICANT CHANGE UP (ref 0–6)
HCT VFR BLD CALC: 38.6 % — SIGNIFICANT CHANGE UP (ref 34.5–45)
HGB BLD-MCNC: 13.4 G/DL — SIGNIFICANT CHANGE UP (ref 11.5–15.5)
LYMPHOCYTES # BLD AUTO: 1.2 K/UL — SIGNIFICANT CHANGE UP (ref 1–3.3)
LYMPHOCYTES # BLD AUTO: 35.1 % — SIGNIFICANT CHANGE UP (ref 13–44)
MCHC RBC-ENTMCNC: 32.7 PG — SIGNIFICANT CHANGE UP (ref 27–34)
MCHC RBC-ENTMCNC: 34.8 G/DL — SIGNIFICANT CHANGE UP (ref 32–36)
MCV RBC AUTO: 94 FL — SIGNIFICANT CHANGE UP (ref 80–100)
MONOCYTES # BLD AUTO: 0.5 K/UL — SIGNIFICANT CHANGE UP (ref 0–0.9)
MONOCYTES NFR BLD AUTO: 14 % — SIGNIFICANT CHANGE UP (ref 2–14)
NEUTROPHILS # BLD AUTO: 1.6 K/UL — LOW (ref 1.8–7.4)
NEUTROPHILS NFR BLD AUTO: 48.2 % — SIGNIFICANT CHANGE UP (ref 43–77)
PLATELET # BLD AUTO: 254 K/UL — SIGNIFICANT CHANGE UP (ref 150–400)
RBC # BLD: 4.11 M/UL — SIGNIFICANT CHANGE UP (ref 3.8–5.2)
RBC # FLD: 10.8 % — SIGNIFICANT CHANGE UP (ref 10.3–14.5)
WBC # BLD: 3.4 K/UL — LOW (ref 3.8–10.5)
WBC # FLD AUTO: 3.4 K/UL — LOW (ref 3.8–10.5)

## 2024-11-06 PROCEDURE — 99214 OFFICE O/P EST MOD 30 MIN: CPT

## 2024-11-07 DIAGNOSIS — Z51.89 ENCOUNTER FOR OTHER SPECIFIED AFTERCARE: ICD-10-CM

## 2024-11-07 DIAGNOSIS — R11.2 NAUSEA WITH VOMITING, UNSPECIFIED: ICD-10-CM

## 2024-11-07 DIAGNOSIS — Z51.11 ENCOUNTER FOR ANTINEOPLASTIC CHEMOTHERAPY: ICD-10-CM

## 2024-11-07 LAB
ALBUMIN SERPL ELPH-MCNC: 4.3 G/DL — SIGNIFICANT CHANGE UP (ref 3.3–5)
ALP SERPL-CCNC: 128 U/L — HIGH (ref 40–120)
ALT FLD-CCNC: 46 U/L — HIGH (ref 10–45)
ANION GAP SERPL CALC-SCNC: 15 MMOL/L — SIGNIFICANT CHANGE UP (ref 5–17)
AST SERPL-CCNC: 53 U/L — HIGH (ref 10–40)
BILIRUB SERPL-MCNC: <0.2 MG/DL — SIGNIFICANT CHANGE UP (ref 0.2–1.2)
BUN SERPL-MCNC: 12 MG/DL — SIGNIFICANT CHANGE UP (ref 7–23)
CALCIUM SERPL-MCNC: 9.7 MG/DL — SIGNIFICANT CHANGE UP (ref 8.4–10.5)
CHLORIDE SERPL-SCNC: 102 MMOL/L — SIGNIFICANT CHANGE UP (ref 96–108)
CO2 SERPL-SCNC: 20 MMOL/L — LOW (ref 22–31)
CREAT SERPL-MCNC: 0.66 MG/DL — SIGNIFICANT CHANGE UP (ref 0.5–1.3)
EGFR: 105 ML/MIN/1.73M2 — SIGNIFICANT CHANGE UP
GLUCOSE SERPL-MCNC: 105 MG/DL — HIGH (ref 70–99)
POTASSIUM SERPL-MCNC: 5.8 MMOL/L — HIGH (ref 3.5–5.3)
POTASSIUM SERPL-SCNC: 5.8 MMOL/L — HIGH (ref 3.5–5.3)
PROT SERPL-MCNC: 7.4 G/DL — SIGNIFICANT CHANGE UP (ref 6–8.3)
SODIUM SERPL-SCNC: 137 MMOL/L — SIGNIFICANT CHANGE UP (ref 135–145)

## 2024-11-20 ENCOUNTER — APPOINTMENT (OUTPATIENT)
Age: 54
End: 2024-11-20

## 2024-11-20 ENCOUNTER — RESULT REVIEW (OUTPATIENT)
Age: 54
End: 2024-11-20

## 2024-11-20 ENCOUNTER — APPOINTMENT (OUTPATIENT)
Dept: HEMATOLOGY ONCOLOGY | Facility: CLINIC | Age: 54
End: 2024-11-20
Payer: MEDICAID

## 2024-11-20 VITALS
DIASTOLIC BLOOD PRESSURE: 86 MMHG | BODY MASS INDEX: 22.68 KG/M2 | HEART RATE: 91 BPM | SYSTOLIC BLOOD PRESSURE: 131 MMHG | HEIGHT: 63 IN | WEIGHT: 128 LBS | OXYGEN SATURATION: 99 %

## 2024-11-20 DIAGNOSIS — Z79.899 OTHER LONG TERM (CURRENT) DRUG THERAPY: ICD-10-CM

## 2024-11-20 DIAGNOSIS — G89.3 NEOPLASM RELATED PAIN (ACUTE) (CHRONIC): ICD-10-CM

## 2024-11-20 DIAGNOSIS — C50.919 MALIGNANT NEOPLASM OF UNSPECIFIED SITE OF UNSPECIFIED FEMALE BREAST: ICD-10-CM

## 2024-11-20 LAB
BASOPHILS # BLD AUTO: 0.1 K/UL — SIGNIFICANT CHANGE UP (ref 0–0.2)
BASOPHILS NFR BLD AUTO: 1.8 % — SIGNIFICANT CHANGE UP (ref 0–2)
EOSINOPHIL # BLD AUTO: 0 K/UL — SIGNIFICANT CHANGE UP (ref 0–0.5)
EOSINOPHIL NFR BLD AUTO: 0.4 % — SIGNIFICANT CHANGE UP (ref 0–6)
HCT VFR BLD CALC: 35.8 % — SIGNIFICANT CHANGE UP (ref 34.5–45)
HGB BLD-MCNC: 12.4 G/DL — SIGNIFICANT CHANGE UP (ref 11.5–15.5)
LYMPHOCYTES # BLD AUTO: 1.1 K/UL — SIGNIFICANT CHANGE UP (ref 1–3.3)
LYMPHOCYTES # BLD AUTO: 20.2 % — SIGNIFICANT CHANGE UP (ref 13–44)
MCHC RBC-ENTMCNC: 31.9 PG — SIGNIFICANT CHANGE UP (ref 27–34)
MCHC RBC-ENTMCNC: 34.5 G/DL — SIGNIFICANT CHANGE UP (ref 32–36)
MCV RBC AUTO: 92.5 FL — SIGNIFICANT CHANGE UP (ref 80–100)
MONOCYTES # BLD AUTO: 0.7 K/UL — SIGNIFICANT CHANGE UP (ref 0–0.9)
MONOCYTES NFR BLD AUTO: 12.6 % — SIGNIFICANT CHANGE UP (ref 2–14)
NEUTROPHILS # BLD AUTO: 3.4 K/UL — SIGNIFICANT CHANGE UP (ref 1.8–7.4)
NEUTROPHILS NFR BLD AUTO: 65.1 % — SIGNIFICANT CHANGE UP (ref 43–77)
PLATELET # BLD AUTO: 298 K/UL — SIGNIFICANT CHANGE UP (ref 150–400)
RBC # BLD: 3.88 M/UL — SIGNIFICANT CHANGE UP (ref 3.8–5.2)
RBC # FLD: 11.7 % — SIGNIFICANT CHANGE UP (ref 10.3–14.5)
WBC # BLD: 5.2 K/UL — SIGNIFICANT CHANGE UP (ref 3.8–10.5)
WBC # FLD AUTO: 5.2 K/UL — SIGNIFICANT CHANGE UP (ref 3.8–10.5)

## 2024-11-20 PROCEDURE — 99215 OFFICE O/P EST HI 40 MIN: CPT

## 2024-11-20 PROCEDURE — G2211 COMPLEX E/M VISIT ADD ON: CPT | Mod: NC

## 2024-11-21 LAB
ALBUMIN SERPL ELPH-MCNC: 4.3 G/DL — SIGNIFICANT CHANGE UP (ref 3.3–5)
ALP SERPL-CCNC: 154 U/L — HIGH (ref 40–120)
ALT FLD-CCNC: 61 U/L — HIGH (ref 10–45)
ANION GAP SERPL CALC-SCNC: 13 MMOL/L — SIGNIFICANT CHANGE UP (ref 5–17)
AST SERPL-CCNC: 31 U/L — SIGNIFICANT CHANGE UP (ref 10–40)
BILIRUB SERPL-MCNC: 0.2 MG/DL — SIGNIFICANT CHANGE UP (ref 0.2–1.2)
BUN SERPL-MCNC: 10 MG/DL — SIGNIFICANT CHANGE UP (ref 7–23)
CALCIUM SERPL-MCNC: 9.7 MG/DL — SIGNIFICANT CHANGE UP (ref 8.4–10.5)
CHLORIDE SERPL-SCNC: 104 MMOL/L — SIGNIFICANT CHANGE UP (ref 96–108)
CO2 SERPL-SCNC: 25 MMOL/L — SIGNIFICANT CHANGE UP (ref 22–31)
CREAT SERPL-MCNC: 0.73 MG/DL — SIGNIFICANT CHANGE UP (ref 0.5–1.3)
EGFR: 98 ML/MIN/1.73M2 — SIGNIFICANT CHANGE UP
GLUCOSE SERPL-MCNC: 80 MG/DL — SIGNIFICANT CHANGE UP (ref 70–99)
POTASSIUM SERPL-MCNC: 4.4 MMOL/L — SIGNIFICANT CHANGE UP (ref 3.5–5.3)
POTASSIUM SERPL-SCNC: 4.4 MMOL/L — SIGNIFICANT CHANGE UP (ref 3.5–5.3)
PROT SERPL-MCNC: 7 G/DL — SIGNIFICANT CHANGE UP (ref 6–8.3)
SODIUM SERPL-SCNC: 142 MMOL/L — SIGNIFICANT CHANGE UP (ref 135–145)

## 2024-11-25 ENCOUNTER — RESULT REVIEW (OUTPATIENT)
Age: 54
End: 2024-11-25

## 2024-12-04 ENCOUNTER — RESULT REVIEW (OUTPATIENT)
Age: 54
End: 2024-12-04

## 2024-12-04 ENCOUNTER — OUTPATIENT (OUTPATIENT)
Dept: OUTPATIENT SERVICES | Facility: HOSPITAL | Age: 54
LOS: 1 days | End: 2024-12-04

## 2024-12-04 ENCOUNTER — APPOINTMENT (OUTPATIENT)
Dept: HEMATOLOGY ONCOLOGY | Facility: CLINIC | Age: 54
End: 2024-12-04

## 2024-12-04 ENCOUNTER — APPOINTMENT (OUTPATIENT)
Dept: INTERVENTIONAL RADIOLOGY/VASCULAR | Facility: CLINIC | Age: 54
End: 2024-12-04

## 2024-12-04 ENCOUNTER — APPOINTMENT (OUTPATIENT)
Age: 54
End: 2024-12-04

## 2024-12-04 VITALS
DIASTOLIC BLOOD PRESSURE: 87 MMHG | OXYGEN SATURATION: 97 % | TEMPERATURE: 98.6 F | HEART RATE: 114 BPM | SYSTOLIC BLOOD PRESSURE: 155 MMHG | RESPIRATION RATE: 16 BRPM

## 2024-12-04 DIAGNOSIS — Z98.82 BREAST IMPLANT STATUS: Chronic | ICD-10-CM

## 2024-12-04 DIAGNOSIS — Z90.13 ACQUIRED ABSENCE OF BILATERAL BREASTS AND NIPPLES: Chronic | ICD-10-CM

## 2024-12-04 DIAGNOSIS — C50.919 MALIGNANT NEOPLASM OF UNSPECIFIED SITE OF UNSPECIFIED FEMALE BREAST: ICD-10-CM

## 2024-12-04 LAB
BASOPHILS # BLD AUTO: 0 K/UL — SIGNIFICANT CHANGE UP (ref 0–0.2)
BASOPHILS NFR BLD AUTO: 0.2 % — SIGNIFICANT CHANGE UP (ref 0–2)
EOSINOPHIL # BLD AUTO: 0 K/UL — SIGNIFICANT CHANGE UP (ref 0–0.5)
EOSINOPHIL NFR BLD AUTO: 0.1 % — SIGNIFICANT CHANGE UP (ref 0–6)
HCT VFR BLD CALC: 33.5 % — LOW (ref 34.5–45)
HGB BLD-MCNC: 11.7 G/DL — SIGNIFICANT CHANGE UP (ref 11.5–15.5)
LYMPHOCYTES # BLD AUTO: 0.5 K/UL — LOW (ref 1–3.3)
LYMPHOCYTES # BLD AUTO: 7.8 % — LOW (ref 13–44)
MCHC RBC-ENTMCNC: 32 PG — SIGNIFICANT CHANGE UP (ref 27–34)
MCHC RBC-ENTMCNC: 34.8 G/DL — SIGNIFICANT CHANGE UP (ref 32–36)
MCV RBC AUTO: 91.8 FL — SIGNIFICANT CHANGE UP (ref 80–100)
MONOCYTES # BLD AUTO: 0.1 K/UL — SIGNIFICANT CHANGE UP (ref 0–0.9)
MONOCYTES NFR BLD AUTO: 1 % — LOW (ref 2–14)
NEUTROPHILS # BLD AUTO: 5.7 K/UL — SIGNIFICANT CHANGE UP (ref 1.8–7.4)
NEUTROPHILS NFR BLD AUTO: 90.8 % — HIGH (ref 43–77)
PLATELET # BLD AUTO: 327 K/UL — SIGNIFICANT CHANGE UP (ref 150–400)
RBC # BLD: 3.65 M/UL — LOW (ref 3.8–5.2)
RBC # FLD: 12.8 % — SIGNIFICANT CHANGE UP (ref 10.3–14.5)
WBC # BLD: 6.3 K/UL — SIGNIFICANT CHANGE UP (ref 3.8–10.5)
WBC # FLD AUTO: 6.3 K/UL — SIGNIFICANT CHANGE UP (ref 3.8–10.5)

## 2024-12-04 PROCEDURE — 77001 FLUOROGUIDE FOR VEIN DEVICE: CPT | Mod: 26,GC

## 2024-12-04 PROCEDURE — 36561 INSERT TUNNELED CV CATH: CPT | Mod: RT

## 2024-12-04 PROCEDURE — 76937 US GUIDE VASCULAR ACCESS: CPT | Mod: 26

## 2024-12-04 RX ORDER — DEXAMETHASONE 4 MG/1
4 TABLET ORAL
Qty: 5 | Refills: 0 | Status: ACTIVE | COMMUNITY
Start: 2024-11-26 | End: 1900-01-01

## 2024-12-05 LAB
ALBUMIN SERPL ELPH-MCNC: 4.5 G/DL — SIGNIFICANT CHANGE UP (ref 3.3–5)
ALP SERPL-CCNC: 129 U/L — HIGH (ref 40–120)
ALT FLD-CCNC: 22 U/L — SIGNIFICANT CHANGE UP (ref 10–45)
ANION GAP SERPL CALC-SCNC: 14 MMOL/L — SIGNIFICANT CHANGE UP (ref 5–17)
AST SERPL-CCNC: 17 U/L — SIGNIFICANT CHANGE UP (ref 10–40)
BILIRUB SERPL-MCNC: 0.2 MG/DL — SIGNIFICANT CHANGE UP (ref 0.2–1.2)
BUN SERPL-MCNC: 12 MG/DL — SIGNIFICANT CHANGE UP (ref 7–23)
CALCIUM SERPL-MCNC: 9.4 MG/DL — SIGNIFICANT CHANGE UP (ref 8.4–10.5)
CHLORIDE SERPL-SCNC: 107 MMOL/L — SIGNIFICANT CHANGE UP (ref 96–108)
CO2 SERPL-SCNC: 21 MMOL/L — LOW (ref 22–31)
CREAT SERPL-MCNC: 0.64 MG/DL — SIGNIFICANT CHANGE UP (ref 0.5–1.3)
EGFR: 105 ML/MIN/1.73M2 — SIGNIFICANT CHANGE UP
GLUCOSE SERPL-MCNC: 175 MG/DL — HIGH (ref 70–99)
POTASSIUM SERPL-MCNC: 3.8 MMOL/L — SIGNIFICANT CHANGE UP (ref 3.5–5.3)
POTASSIUM SERPL-SCNC: 3.8 MMOL/L — SIGNIFICANT CHANGE UP (ref 3.5–5.3)
PROT SERPL-MCNC: 6.9 G/DL — SIGNIFICANT CHANGE UP (ref 6–8.3)
SODIUM SERPL-SCNC: 142 MMOL/L — SIGNIFICANT CHANGE UP (ref 135–145)

## 2024-12-18 ENCOUNTER — RESULT REVIEW (OUTPATIENT)
Age: 54
End: 2024-12-18

## 2024-12-18 ENCOUNTER — APPOINTMENT (OUTPATIENT)
Age: 54
End: 2024-12-18

## 2024-12-18 LAB
BASOPHILS # BLD AUTO: 0.1 K/UL — SIGNIFICANT CHANGE UP (ref 0–0.2)
BASOPHILS NFR BLD AUTO: 0.6 % — SIGNIFICANT CHANGE UP (ref 0–2)
EOSINOPHIL # BLD AUTO: 0 K/UL — SIGNIFICANT CHANGE UP (ref 0–0.5)
EOSINOPHIL NFR BLD AUTO: 0.2 % — SIGNIFICANT CHANGE UP (ref 0–6)
HCT VFR BLD CALC: 32.5 % — LOW (ref 34.5–45)
HGB BLD-MCNC: 11.4 G/DL — LOW (ref 11.5–15.5)
LYMPHOCYTES # BLD AUTO: 0.6 K/UL — LOW (ref 1–3.3)
LYMPHOCYTES # BLD AUTO: 6 % — LOW (ref 13–44)
MCHC RBC-ENTMCNC: 34 PG — SIGNIFICANT CHANGE UP (ref 27–34)
MCHC RBC-ENTMCNC: 35 G/DL — SIGNIFICANT CHANGE UP (ref 32–36)
MCV RBC AUTO: 97.1 FL — SIGNIFICANT CHANGE UP (ref 80–100)
MONOCYTES # BLD AUTO: 0.2 K/UL — SIGNIFICANT CHANGE UP (ref 0–0.9)
MONOCYTES NFR BLD AUTO: 1.8 % — LOW (ref 2–14)
NEUTROPHILS # BLD AUTO: 8.9 K/UL — HIGH (ref 1.8–7.4)
NEUTROPHILS NFR BLD AUTO: 91.4 % — HIGH (ref 43–77)
PLATELET # BLD AUTO: 333 K/UL — SIGNIFICANT CHANGE UP (ref 150–400)
RBC # BLD: 3.35 M/UL — LOW (ref 3.8–5.2)
RBC # FLD: 16.4 % — HIGH (ref 10.3–14.5)
WBC # BLD: 9.7 K/UL — SIGNIFICANT CHANGE UP (ref 3.8–10.5)
WBC # FLD AUTO: 9.7 K/UL — SIGNIFICANT CHANGE UP (ref 3.8–10.5)

## 2024-12-19 LAB
ALBUMIN SERPL ELPH-MCNC: 4.4 G/DL — SIGNIFICANT CHANGE UP (ref 3.3–5)
ALP SERPL-CCNC: 122 U/L — HIGH (ref 40–120)
ALT FLD-CCNC: 33 U/L — SIGNIFICANT CHANGE UP (ref 10–45)
ANION GAP SERPL CALC-SCNC: 13 MMOL/L — SIGNIFICANT CHANGE UP (ref 5–17)
AST SERPL-CCNC: 21 U/L — SIGNIFICANT CHANGE UP (ref 10–40)
BILIRUB SERPL-MCNC: 0.2 MG/DL — SIGNIFICANT CHANGE UP (ref 0.2–1.2)
BUN SERPL-MCNC: 18 MG/DL — SIGNIFICANT CHANGE UP (ref 7–23)
CALCIUM SERPL-MCNC: 9.8 MG/DL — SIGNIFICANT CHANGE UP (ref 8.4–10.5)
CHLORIDE SERPL-SCNC: 106 MMOL/L — SIGNIFICANT CHANGE UP (ref 96–108)
CO2 SERPL-SCNC: 20 MMOL/L — LOW (ref 22–31)
CREAT SERPL-MCNC: 0.57 MG/DL — SIGNIFICANT CHANGE UP (ref 0.5–1.3)
EGFR: 108 ML/MIN/1.73M2 — SIGNIFICANT CHANGE UP
GLUCOSE SERPL-MCNC: 128 MG/DL — HIGH (ref 70–99)
POTASSIUM SERPL-MCNC: 4.3 MMOL/L — SIGNIFICANT CHANGE UP (ref 3.5–5.3)
POTASSIUM SERPL-SCNC: 4.3 MMOL/L — SIGNIFICANT CHANGE UP (ref 3.5–5.3)
PROT SERPL-MCNC: 7 G/DL — SIGNIFICANT CHANGE UP (ref 6–8.3)
SODIUM SERPL-SCNC: 139 MMOL/L — SIGNIFICANT CHANGE UP (ref 135–145)

## 2024-12-26 ENCOUNTER — APPOINTMENT (OUTPATIENT)
Age: 54
End: 2024-12-26

## 2024-12-27 ENCOUNTER — OUTPATIENT (OUTPATIENT)
Dept: OUTPATIENT SERVICES | Facility: HOSPITAL | Age: 54
LOS: 1 days | Discharge: HOME | End: 2024-12-27

## 2024-12-27 DIAGNOSIS — Z90.49 ACQUIRED ABSENCE OF OTHER SPECIFIED PARTS OF DIGESTIVE TRACT: Chronic | ICD-10-CM

## 2024-12-27 DIAGNOSIS — Z90.710 ACQUIRED ABSENCE OF BOTH CERVIX AND UTERUS: Chronic | ICD-10-CM

## 2024-12-27 DIAGNOSIS — Z90.13 ACQUIRED ABSENCE OF BILATERAL BREASTS AND NIPPLES: Chronic | ICD-10-CM

## 2024-12-27 DIAGNOSIS — Z98.82 BREAST IMPLANT STATUS: Chronic | ICD-10-CM

## 2024-12-27 DIAGNOSIS — C50.919 MALIGNANT NEOPLASM OF UNSPECIFIED SITE OF UNSPECIFIED FEMALE BREAST: ICD-10-CM

## 2024-12-27 DIAGNOSIS — Z98.890 OTHER SPECIFIED POSTPROCEDURAL STATES: Chronic | ICD-10-CM

## 2025-01-02 ENCOUNTER — APPOINTMENT (OUTPATIENT)
Age: 55
End: 2025-01-02

## 2025-01-02 ENCOUNTER — RESULT REVIEW (OUTPATIENT)
Age: 55
End: 2025-01-02

## 2025-01-02 LAB
ALBUMIN SERPL ELPH-MCNC: 4.5 G/DL — SIGNIFICANT CHANGE UP (ref 3.3–5)
ALP SERPL-CCNC: 156 U/L — HIGH (ref 40–120)
ALT FLD-CCNC: 127 U/L — HIGH (ref 10–45)
ANION GAP SERPL CALC-SCNC: 12 MMOL/L — SIGNIFICANT CHANGE UP (ref 5–17)
AST SERPL-CCNC: 49 U/L — HIGH (ref 10–40)
BASOPHILS # BLD AUTO: 0 K/UL — SIGNIFICANT CHANGE UP (ref 0–0.2)
BASOPHILS NFR BLD AUTO: 0.2 % — SIGNIFICANT CHANGE UP (ref 0–2)
BILIRUB SERPL-MCNC: 0.3 MG/DL — SIGNIFICANT CHANGE UP (ref 0.2–1.2)
BUN SERPL-MCNC: 17 MG/DL — SIGNIFICANT CHANGE UP (ref 7–23)
CALCIUM SERPL-MCNC: 10.3 MG/DL — SIGNIFICANT CHANGE UP (ref 8.4–10.5)
CHLORIDE SERPL-SCNC: 105 MMOL/L — SIGNIFICANT CHANGE UP (ref 96–108)
CO2 SERPL-SCNC: 21 MMOL/L — LOW (ref 22–31)
CREAT SERPL-MCNC: 0.46 MG/DL — LOW (ref 0.5–1.3)
EGFR: 114 ML/MIN/1.73M2 — SIGNIFICANT CHANGE UP
EGFR: 114 ML/MIN/1.73M2 — SIGNIFICANT CHANGE UP
EOSINOPHIL # BLD AUTO: 0 K/UL — SIGNIFICANT CHANGE UP (ref 0–0.5)
EOSINOPHIL NFR BLD AUTO: 0 % — SIGNIFICANT CHANGE UP (ref 0–6)
GLUCOSE SERPL-MCNC: 155 MG/DL — HIGH (ref 70–99)
HCT VFR BLD CALC: 37 % — SIGNIFICANT CHANGE UP (ref 34.5–45)
HGB BLD-MCNC: 12.2 G/DL — SIGNIFICANT CHANGE UP (ref 11.5–15.5)
LYMPHOCYTES # BLD AUTO: 0.4 K/UL — LOW (ref 1–3.3)
LYMPHOCYTES # BLD AUTO: 5.3 % — LOW (ref 13–44)
MCHC RBC-ENTMCNC: 32.8 PG — SIGNIFICANT CHANGE UP (ref 27–34)
MCHC RBC-ENTMCNC: 32.9 G/DL — SIGNIFICANT CHANGE UP (ref 32–36)
MCV RBC AUTO: 99.7 FL — SIGNIFICANT CHANGE UP (ref 80–100)
MONOCYTES # BLD AUTO: 0.2 K/UL — SIGNIFICANT CHANGE UP (ref 0–0.9)
MONOCYTES NFR BLD AUTO: 2.3 % — SIGNIFICANT CHANGE UP (ref 2–14)
NEUTROPHILS # BLD AUTO: 7.3 K/UL — SIGNIFICANT CHANGE UP (ref 1.8–7.4)
NEUTROPHILS NFR BLD AUTO: 92.2 % — HIGH (ref 43–77)
PLATELET # BLD AUTO: 221 K/UL — SIGNIFICANT CHANGE UP (ref 150–400)
POTASSIUM SERPL-MCNC: 4.3 MMOL/L — SIGNIFICANT CHANGE UP (ref 3.5–5.3)
POTASSIUM SERPL-SCNC: 4.3 MMOL/L — SIGNIFICANT CHANGE UP (ref 3.5–5.3)
PROT SERPL-MCNC: 7.6 G/DL — SIGNIFICANT CHANGE UP (ref 6–8.3)
RBC # BLD: 3.71 M/UL — LOW (ref 3.8–5.2)
RBC # FLD: 16.3 % — HIGH (ref 10.3–14.5)
SODIUM SERPL-SCNC: 138 MMOL/L — SIGNIFICANT CHANGE UP (ref 135–145)
WBC # BLD: 7.9 K/UL — SIGNIFICANT CHANGE UP (ref 3.8–10.5)
WBC # FLD AUTO: 7.9 K/UL — SIGNIFICANT CHANGE UP (ref 3.8–10.5)

## 2025-01-03 DIAGNOSIS — Z51.11 ENCOUNTER FOR ANTINEOPLASTIC CHEMOTHERAPY: ICD-10-CM

## 2025-01-03 DIAGNOSIS — R11.2 NAUSEA WITH VOMITING, UNSPECIFIED: ICD-10-CM

## 2025-01-03 RX ORDER — OXYCODONE AND ACETAMINOPHEN 5; 325 MG/1; MG/1
5-325 TABLET ORAL EVERY 6 HOURS
Qty: 56 | Refills: 0 | Status: ACTIVE | COMMUNITY
Start: 2025-01-03 | End: 1900-01-01

## 2025-01-09 ENCOUNTER — APPOINTMENT (OUTPATIENT)
Age: 55
End: 2025-01-09

## 2025-01-14 ENCOUNTER — NON-APPOINTMENT (OUTPATIENT)
Age: 55
End: 2025-01-14

## 2025-01-14 ENCOUNTER — APPOINTMENT (OUTPATIENT)
Dept: PHYSICAL MEDICINE AND REHAB | Facility: CLINIC | Age: 55
End: 2025-01-14
Payer: MEDICAID

## 2025-01-14 ENCOUNTER — APPOINTMENT (OUTPATIENT)
Dept: HEMATOLOGY ONCOLOGY | Facility: CLINIC | Age: 55
End: 2025-01-14
Payer: MEDICAID

## 2025-01-14 VITALS
WEIGHT: 129 LBS | SYSTOLIC BLOOD PRESSURE: 118 MMHG | DIASTOLIC BLOOD PRESSURE: 75 MMHG | OXYGEN SATURATION: 99 % | HEIGHT: 63 IN | TEMPERATURE: 97.6 F | HEART RATE: 102 BPM | BODY MASS INDEX: 22.86 KG/M2

## 2025-01-14 VITALS
RESPIRATION RATE: 14 BRPM | DIASTOLIC BLOOD PRESSURE: 74 MMHG | HEART RATE: 109 BPM | BODY MASS INDEX: 22.86 KG/M2 | SYSTOLIC BLOOD PRESSURE: 142 MMHG | HEIGHT: 63 IN | WEIGHT: 129 LBS

## 2025-01-14 DIAGNOSIS — G89.3 NEOPLASM RELATED PAIN (ACUTE) (CHRONIC): ICD-10-CM

## 2025-01-14 DIAGNOSIS — C50.919 MALIGNANT NEOPLASM OF UNSPECIFIED SITE OF UNSPECIFIED FEMALE BREAST: ICD-10-CM

## 2025-01-14 DIAGNOSIS — Z91.89 OTHER SPECIFIED PERSONAL RISK FACTORS, NOT ELSEWHERE CLASSIFIED: ICD-10-CM

## 2025-01-14 DIAGNOSIS — C50.911 MALIGNANT NEOPLASM OF UNSPECIFIED SITE OF RIGHT FEMALE BREAST: ICD-10-CM

## 2025-01-14 DIAGNOSIS — Z79.69 LONG TERM (CURRENT) USE OF OTHER IMMUNOMODULATORS AND IMMUNOSUPPRESSANTS: ICD-10-CM

## 2025-01-14 PROCEDURE — 99214 OFFICE O/P EST MOD 30 MIN: CPT

## 2025-01-14 PROCEDURE — 99215 OFFICE O/P EST HI 40 MIN: CPT

## 2025-01-14 PROCEDURE — G2211 COMPLEX E/M VISIT ADD ON: CPT | Mod: NC

## 2025-01-16 ENCOUNTER — APPOINTMENT (OUTPATIENT)
Age: 55
End: 2025-01-16

## 2025-01-16 ENCOUNTER — RESULT REVIEW (OUTPATIENT)
Age: 55
End: 2025-01-16

## 2025-01-16 ENCOUNTER — TRANSCRIPTION ENCOUNTER (OUTPATIENT)
Age: 55
End: 2025-01-16

## 2025-01-16 LAB
BASOPHILS # BLD AUTO: 0 K/UL — SIGNIFICANT CHANGE UP (ref 0–0.2)
BASOPHILS NFR BLD AUTO: 1.5 % — SIGNIFICANT CHANGE UP (ref 0–2)
EOSINOPHIL # BLD AUTO: 0.2 K/UL — SIGNIFICANT CHANGE UP (ref 0–0.5)
EOSINOPHIL NFR BLD AUTO: 5.5 % — SIGNIFICANT CHANGE UP (ref 0–6)
HCT VFR BLD CALC: 32.8 % — LOW (ref 34.5–45)
HGB BLD-MCNC: 10.6 G/DL — LOW (ref 11.5–15.5)
LYMPHOCYTES # BLD AUTO: 0.6 K/UL — LOW (ref 1–3.3)
LYMPHOCYTES # BLD AUTO: 18.6 % — SIGNIFICANT CHANGE UP (ref 13–44)
MCHC RBC-ENTMCNC: 32.4 PG — SIGNIFICANT CHANGE UP (ref 27–34)
MCHC RBC-ENTMCNC: 32.5 G/DL — SIGNIFICANT CHANGE UP (ref 32–36)
MCV RBC AUTO: 99.9 FL — SIGNIFICANT CHANGE UP (ref 80–100)
MONOCYTES # BLD AUTO: 0.4 K/UL — SIGNIFICANT CHANGE UP (ref 0–0.9)
MONOCYTES NFR BLD AUTO: 12.7 % — SIGNIFICANT CHANGE UP (ref 2–14)
NEUTROPHILS # BLD AUTO: 1.9 K/UL — SIGNIFICANT CHANGE UP (ref 1.8–7.4)
NEUTROPHILS NFR BLD AUTO: 61.8 % — SIGNIFICANT CHANGE UP (ref 43–77)
PLATELET # BLD AUTO: 286 K/UL — SIGNIFICANT CHANGE UP (ref 150–400)
RBC # BLD: 3.28 M/UL — LOW (ref 3.8–5.2)
RBC # FLD: 15.7 % — HIGH (ref 10.3–14.5)
WBC # BLD: 3.1 K/UL — LOW (ref 3.8–10.5)
WBC # FLD AUTO: 3.1 K/UL — LOW (ref 3.8–10.5)

## 2025-01-17 LAB
ALBUMIN SERPL ELPH-MCNC: 4 G/DL
ALBUMIN SERPL ELPH-MCNC: 4 G/DL — SIGNIFICANT CHANGE UP (ref 3.3–5)
ALP BLD-CCNC: 149 U/L
ALP SERPL-CCNC: 138 U/L — HIGH (ref 40–120)
ALT FLD-CCNC: 32 U/L — SIGNIFICANT CHANGE UP (ref 10–45)
ALT SERPL-CCNC: 35 U/L
ANION GAP SERPL CALC-SCNC: 11 MMOL/L
ANION GAP SERPL CALC-SCNC: 12 MMOL/L — SIGNIFICANT CHANGE UP (ref 5–17)
AST SERPL-CCNC: 19 U/L
AST SERPL-CCNC: 26 U/L — SIGNIFICANT CHANGE UP (ref 10–40)
BILIRUB SERPL-MCNC: 0.2 MG/DL
BILIRUB SERPL-MCNC: <0.2 MG/DL — SIGNIFICANT CHANGE UP (ref 0.2–1.2)
BUN SERPL-MCNC: 13 MG/DL — SIGNIFICANT CHANGE UP (ref 7–23)
BUN SERPL-MCNC: 14 MG/DL
CALCIUM SERPL-MCNC: 9.1 MG/DL — SIGNIFICANT CHANGE UP (ref 8.4–10.5)
CALCIUM SERPL-MCNC: 9.4 MG/DL
CHLORIDE SERPL-SCNC: 106 MMOL/L — SIGNIFICANT CHANGE UP (ref 96–108)
CHLORIDE SERPL-SCNC: 107 MMOL/L
CO2 SERPL-SCNC: 22 MMOL/L
CO2 SERPL-SCNC: 22 MMOL/L — SIGNIFICANT CHANGE UP (ref 22–31)
CREAT SERPL-MCNC: 0.6 MG/DL — SIGNIFICANT CHANGE UP (ref 0.5–1.3)
CREAT SERPL-MCNC: 0.62 MG/DL
EGFR: 106 ML/MIN/1.73M2
EGFR: 107 ML/MIN/1.73M2 — SIGNIFICANT CHANGE UP
EGFR: 107 ML/MIN/1.73M2 — SIGNIFICANT CHANGE UP
GLUCOSE SERPL-MCNC: 102 MG/DL — HIGH (ref 70–99)
GLUCOSE SERPL-MCNC: 96 MG/DL
POTASSIUM SERPL-MCNC: 4.1 MMOL/L — SIGNIFICANT CHANGE UP (ref 3.5–5.3)
POTASSIUM SERPL-SCNC: 4.1 MMOL/L — SIGNIFICANT CHANGE UP (ref 3.5–5.3)
POTASSIUM SERPL-SCNC: 4.2 MMOL/L
PROT SERPL-MCNC: 6.3 G/DL
PROT SERPL-MCNC: 6.4 G/DL — SIGNIFICANT CHANGE UP (ref 6–8.3)
SODIUM SERPL-SCNC: 140 MMOL/L
SODIUM SERPL-SCNC: 140 MMOL/L — SIGNIFICANT CHANGE UP (ref 135–145)

## 2025-01-23 ENCOUNTER — APPOINTMENT (OUTPATIENT)
Age: 55
End: 2025-01-23

## 2025-01-30 ENCOUNTER — RESULT REVIEW (OUTPATIENT)
Age: 55
End: 2025-01-30

## 2025-01-30 ENCOUNTER — APPOINTMENT (OUTPATIENT)
Age: 55
End: 2025-01-30

## 2025-01-30 LAB
ALBUMIN SERPL ELPH-MCNC: 4.5 G/DL — SIGNIFICANT CHANGE UP (ref 3.3–5)
ALP SERPL-CCNC: 167 U/L — HIGH (ref 40–120)
ALT FLD-CCNC: 92 U/L — HIGH (ref 10–45)
ANION GAP SERPL CALC-SCNC: 14 MMOL/L — SIGNIFICANT CHANGE UP (ref 5–17)
ANISOCYTOSIS BLD QL: SLIGHT — SIGNIFICANT CHANGE UP
AST SERPL-CCNC: 35 U/L — SIGNIFICANT CHANGE UP (ref 10–40)
BASOPHILS # BLD AUTO: 0.1 K/UL — SIGNIFICANT CHANGE UP (ref 0–0.2)
BASOPHILS NFR BLD AUTO: 2.4 % — HIGH (ref 0–2)
BILIRUB SERPL-MCNC: 0.3 MG/DL — SIGNIFICANT CHANGE UP (ref 0.2–1.2)
BUN SERPL-MCNC: 10 MG/DL — SIGNIFICANT CHANGE UP (ref 7–23)
CALCIUM SERPL-MCNC: 9.3 MG/DL — SIGNIFICANT CHANGE UP (ref 8.4–10.5)
CHLORIDE SERPL-SCNC: 106 MMOL/L — SIGNIFICANT CHANGE UP (ref 96–108)
CO2 SERPL-SCNC: 20 MMOL/L — LOW (ref 22–31)
CREAT SERPL-MCNC: 0.62 MG/DL — SIGNIFICANT CHANGE UP (ref 0.5–1.3)
EGFR: 106 ML/MIN/1.73M2 — SIGNIFICANT CHANGE UP
EGFR: 106 ML/MIN/1.73M2 — SIGNIFICANT CHANGE UP
EOSINOPHIL # BLD AUTO: 0.1 K/UL — SIGNIFICANT CHANGE UP (ref 0–0.5)
EOSINOPHIL NFR BLD AUTO: 4.1 % — SIGNIFICANT CHANGE UP (ref 0–6)
GLUCOSE SERPL-MCNC: 104 MG/DL — HIGH (ref 70–99)
HCT VFR BLD CALC: 36.3 % — SIGNIFICANT CHANGE UP (ref 34.5–45)
HGB BLD-MCNC: 11.8 G/DL — SIGNIFICANT CHANGE UP (ref 11.5–15.5)
LYMPHOCYTES # BLD AUTO: 0.7 K/UL — LOW (ref 1–3.3)
LYMPHOCYTES # BLD AUTO: 21.9 % — SIGNIFICANT CHANGE UP (ref 13–44)
MCHC RBC-ENTMCNC: 32.4 G/DL — SIGNIFICANT CHANGE UP (ref 32–36)
MCHC RBC-ENTMCNC: 33.4 PG — SIGNIFICANT CHANGE UP (ref 27–34)
MCV RBC AUTO: 103.1 FL — HIGH (ref 80–100)
MONOCYTES # BLD AUTO: 0.4 K/UL — SIGNIFICANT CHANGE UP (ref 0–0.9)
MONOCYTES NFR BLD AUTO: 11.6 % — SIGNIFICANT CHANGE UP (ref 2–14)
NEUTROPHILS # BLD AUTO: 1.8 K/UL — SIGNIFICANT CHANGE UP (ref 1.8–7.4)
NEUTROPHILS NFR BLD AUTO: 60 % — SIGNIFICANT CHANGE UP (ref 43–77)
PLAT MORPH BLD: NORMAL — SIGNIFICANT CHANGE UP
PLATELET # BLD AUTO: 221 K/UL — SIGNIFICANT CHANGE UP (ref 150–400)
POIKILOCYTOSIS BLD QL AUTO: SLIGHT — SIGNIFICANT CHANGE UP
POTASSIUM SERPL-MCNC: 4.3 MMOL/L — SIGNIFICANT CHANGE UP (ref 3.5–5.3)
POTASSIUM SERPL-SCNC: 4.3 MMOL/L — SIGNIFICANT CHANGE UP (ref 3.5–5.3)
PROT SERPL-MCNC: 6.6 G/DL — SIGNIFICANT CHANGE UP (ref 6–8.3)
RBC # BLD: 3.52 M/UL — LOW (ref 3.8–5.2)
RBC # FLD: 14.4 % — SIGNIFICANT CHANGE UP (ref 10.3–14.5)
RBC BLD AUTO: SIGNIFICANT CHANGE UP
SODIUM SERPL-SCNC: 140 MMOL/L — SIGNIFICANT CHANGE UP (ref 135–145)
WBC # BLD: 3 K/UL — LOW (ref 3.8–10.5)
WBC # FLD AUTO: 3 K/UL — LOW (ref 3.8–10.5)

## 2025-02-03 ENCOUNTER — TRANSCRIPTION ENCOUNTER (OUTPATIENT)
Age: 55
End: 2025-02-03

## 2025-02-03 DIAGNOSIS — R91.8 OTHER NONSPECIFIC ABNORMAL FINDING OF LUNG FIELD: ICD-10-CM

## 2025-02-04 ENCOUNTER — NON-APPOINTMENT (OUTPATIENT)
Age: 55
End: 2025-02-04

## 2025-02-11 ENCOUNTER — APPOINTMENT (OUTPATIENT)
Dept: HEMATOLOGY ONCOLOGY | Facility: CLINIC | Age: 55
End: 2025-02-11

## 2025-02-11 DIAGNOSIS — C50.911 MALIGNANT NEOPLASM OF UNSPECIFIED SITE OF RIGHT FEMALE BREAST: ICD-10-CM

## 2025-02-11 DIAGNOSIS — G89.3 NEOPLASM RELATED PAIN (ACUTE) (CHRONIC): ICD-10-CM

## 2025-02-11 DIAGNOSIS — Z79.69 LONG TERM (CURRENT) USE OF OTHER IMMUNOMODULATORS AND IMMUNOSUPPRESSANTS: ICD-10-CM

## 2025-02-11 DIAGNOSIS — C50.919 MALIGNANT NEOPLASM OF UNSPECIFIED SITE OF UNSPECIFIED FEMALE BREAST: ICD-10-CM

## 2025-02-11 DIAGNOSIS — R93.89 ABNORMAL FINDINGS ON DIAGNOSTIC IMAGING OF OTHER SPECIFIED BODY STRUCTURES: ICD-10-CM

## 2025-02-11 DIAGNOSIS — R59.0 LOCALIZED ENLARGED LYMPH NODES: ICD-10-CM

## 2025-02-11 DIAGNOSIS — R91.1 SOLITARY PULMONARY NODULE: ICD-10-CM

## 2025-02-11 PROCEDURE — G2211 COMPLEX E/M VISIT ADD ON: CPT | Mod: NC

## 2025-02-11 PROCEDURE — 99215 OFFICE O/P EST HI 40 MIN: CPT

## 2025-02-12 NOTE — H&P PST ADULT - NSANTHOSAYNRD_GEN_A_CORE
N/A
No. ANGELA screening performed.  STOP BANG Legend: 0-2 = LOW Risk; 3-4 = INTERMEDIATE Risk; 5-8 = HIGH Risk

## 2025-02-24 ENCOUNTER — NON-APPOINTMENT (OUTPATIENT)
Age: 55
End: 2025-02-24

## 2025-03-05 ENCOUNTER — NON-APPOINTMENT (OUTPATIENT)
Age: 55
End: 2025-03-05

## 2025-03-17 ENCOUNTER — RESULT REVIEW (OUTPATIENT)
Age: 55
End: 2025-03-17

## 2025-03-20 ENCOUNTER — NON-APPOINTMENT (OUTPATIENT)
Age: 55
End: 2025-03-20

## 2025-03-24 ENCOUNTER — NON-APPOINTMENT (OUTPATIENT)
Age: 55
End: 2025-03-24

## 2025-03-25 ENCOUNTER — APPOINTMENT (OUTPATIENT)
Dept: PULMONOLOGY | Facility: CLINIC | Age: 55
End: 2025-03-25
Payer: MEDICAID

## 2025-03-25 VITALS — BODY MASS INDEX: 24.48 KG/M2 | HEIGHT: 62 IN | WEIGHT: 133 LBS

## 2025-03-25 VITALS
OXYGEN SATURATION: 99 % | DIASTOLIC BLOOD PRESSURE: 80 MMHG | SYSTOLIC BLOOD PRESSURE: 142 MMHG | RESPIRATION RATE: 16 BRPM | HEART RATE: 85 BPM

## 2025-03-25 DIAGNOSIS — Z01.811 ENCOUNTER FOR PREPROCEDURAL RESPIRATORY EXAMINATION: ICD-10-CM

## 2025-03-25 DIAGNOSIS — J84.116 CRYPTOGENIC ORGANIZING PNEUMONIA: ICD-10-CM

## 2025-03-25 DIAGNOSIS — Z01.818 ENCOUNTER FOR OTHER PREPROCEDURAL EXAMINATION: ICD-10-CM

## 2025-03-25 DIAGNOSIS — R93.89 ABNORMAL FINDINGS ON DIAGNOSTIC IMAGING OF OTHER SPECIFIED BODY STRUCTURES: ICD-10-CM

## 2025-03-25 PROCEDURE — 85018 HEMOGLOBIN: CPT | Mod: QW

## 2025-03-25 PROCEDURE — 94729 DIFFUSING CAPACITY: CPT

## 2025-03-25 PROCEDURE — 94727 GAS DIL/WSHOT DETER LNG VOL: CPT

## 2025-03-25 PROCEDURE — 94010 BREATHING CAPACITY TEST: CPT

## 2025-03-25 PROCEDURE — 99205 OFFICE O/P NEW HI 60 MIN: CPT | Mod: 25

## 2025-03-25 RX ORDER — METHYLPREDNISOLONE 4 MG/1
4 TABLET ORAL
Qty: 1 | Refills: 3 | Status: ACTIVE | COMMUNITY
Start: 2025-03-25 | End: 1900-01-01

## 2025-04-22 ENCOUNTER — OUTPATIENT (OUTPATIENT)
Dept: OUTPATIENT SERVICES | Facility: HOSPITAL | Age: 55
LOS: 1 days | Discharge: HOME | End: 2025-04-22

## 2025-04-22 DIAGNOSIS — Z90.13 ACQUIRED ABSENCE OF BILATERAL BREASTS AND NIPPLES: Chronic | ICD-10-CM

## 2025-04-22 DIAGNOSIS — Z98.890 OTHER SPECIFIED POSTPROCEDURAL STATES: Chronic | ICD-10-CM

## 2025-04-22 DIAGNOSIS — Z98.82 BREAST IMPLANT STATUS: Chronic | ICD-10-CM

## 2025-04-22 DIAGNOSIS — Z90.49 ACQUIRED ABSENCE OF OTHER SPECIFIED PARTS OF DIGESTIVE TRACT: Chronic | ICD-10-CM

## 2025-04-22 DIAGNOSIS — Z90.710 ACQUIRED ABSENCE OF BOTH CERVIX AND UTERUS: Chronic | ICD-10-CM

## 2025-04-22 DIAGNOSIS — C50.919 MALIGNANT NEOPLASM OF UNSPECIFIED SITE OF UNSPECIFIED FEMALE BREAST: ICD-10-CM

## 2025-04-29 ENCOUNTER — APPOINTMENT (OUTPATIENT)
Dept: HEMATOLOGY ONCOLOGY | Facility: CLINIC | Age: 55
End: 2025-04-29
Payer: MEDICAID

## 2025-04-29 ENCOUNTER — RESULT REVIEW (OUTPATIENT)
Age: 55
End: 2025-04-29

## 2025-04-29 VITALS
TEMPERATURE: 98.1 F | DIASTOLIC BLOOD PRESSURE: 82 MMHG | HEART RATE: 100 BPM | SYSTOLIC BLOOD PRESSURE: 132 MMHG | OXYGEN SATURATION: 97 %

## 2025-04-29 DIAGNOSIS — C50.919 MALIGNANT NEOPLASM OF UNSPECIFIED SITE OF UNSPECIFIED FEMALE BREAST: ICD-10-CM

## 2025-04-29 DIAGNOSIS — C50.911 MALIGNANT NEOPLASM OF UNSPECIFIED SITE OF RIGHT FEMALE BREAST: ICD-10-CM

## 2025-04-29 DIAGNOSIS — G89.3 NEOPLASM RELATED PAIN (ACUTE) (CHRONIC): ICD-10-CM

## 2025-04-29 DIAGNOSIS — R91.1 SOLITARY PULMONARY NODULE: ICD-10-CM

## 2025-04-29 DIAGNOSIS — R59.0 LOCALIZED ENLARGED LYMPH NODES: ICD-10-CM

## 2025-04-29 DIAGNOSIS — R93.89 ABNORMAL FINDINGS ON DIAGNOSTIC IMAGING OF OTHER SPECIFIED BODY STRUCTURES: ICD-10-CM

## 2025-04-29 DIAGNOSIS — Z79.69 LONG TERM (CURRENT) USE OF OTHER IMMUNOMODULATORS AND IMMUNOSUPPRESSANTS: ICD-10-CM

## 2025-04-29 LAB
BASOPHILS # BLD AUTO: 0.03 K/UL — SIGNIFICANT CHANGE UP (ref 0–0.2)
BASOPHILS NFR BLD AUTO: 0.8 % — SIGNIFICANT CHANGE UP (ref 0–2)
EOSINOPHIL # BLD AUTO: 0.11 K/UL — SIGNIFICANT CHANGE UP (ref 0–0.5)
EOSINOPHIL NFR BLD AUTO: 2.8 % — SIGNIFICANT CHANGE UP (ref 0–6)
HCT VFR BLD CALC: 41.5 % — SIGNIFICANT CHANGE UP (ref 34.5–45)
HGB BLD-MCNC: 14.3 G/DL — SIGNIFICANT CHANGE UP (ref 11.5–15.5)
IMM GRANULOCYTES # BLD AUTO: 0.01 K/UL — SIGNIFICANT CHANGE UP (ref 0–0.07)
IMM GRANULOCYTES NFR BLD AUTO: 0.3 % — SIGNIFICANT CHANGE UP (ref 0–0.9)
LYMPHOCYTES # BLD AUTO: 0.96 K/UL — LOW (ref 1–3.3)
LYMPHOCYTES NFR BLD AUTO: 24.1 % — SIGNIFICANT CHANGE UP (ref 13–44)
MCHC RBC-ENTMCNC: 31.5 PG — SIGNIFICANT CHANGE UP (ref 27–34)
MCHC RBC-ENTMCNC: 34.5 G/DL — SIGNIFICANT CHANGE UP (ref 32–36)
MCV RBC AUTO: 91.4 FL — SIGNIFICANT CHANGE UP (ref 80–100)
MONOCYTES # BLD AUTO: 0.29 K/UL — SIGNIFICANT CHANGE UP (ref 0–0.9)
MONOCYTES NFR BLD AUTO: 7.3 % — SIGNIFICANT CHANGE UP (ref 2–14)
NEUTROPHILS # BLD AUTO: 2.59 K/UL — SIGNIFICANT CHANGE UP (ref 1.8–7.4)
NEUTROPHILS NFR BLD AUTO: 64.7 % — SIGNIFICANT CHANGE UP (ref 43–77)
NRBC # BLD AUTO: 0 K/UL — SIGNIFICANT CHANGE UP (ref 0–0)
NRBC # FLD: 0 K/UL — SIGNIFICANT CHANGE UP (ref 0–0)
NRBC BLD AUTO-RTO: 0 /100 WBCS — SIGNIFICANT CHANGE UP (ref 0–0)
PLATELET # BLD AUTO: 219 K/UL — SIGNIFICANT CHANGE UP (ref 150–400)
PMV BLD: 9.9 FL — SIGNIFICANT CHANGE UP (ref 7–13)
RBC # BLD: 4.54 M/UL — SIGNIFICANT CHANGE UP (ref 3.8–5.2)
RBC # FLD: 11.9 % — SIGNIFICANT CHANGE UP (ref 10.3–14.5)
WBC # BLD: 3.99 K/UL — SIGNIFICANT CHANGE UP (ref 3.8–10.5)
WBC # FLD AUTO: 3.99 K/UL — SIGNIFICANT CHANGE UP (ref 3.8–10.5)

## 2025-04-29 PROCEDURE — 99215 OFFICE O/P EST HI 40 MIN: CPT

## 2025-04-29 PROCEDURE — G2211 COMPLEX E/M VISIT ADD ON: CPT | Mod: NC

## 2025-04-29 RX ORDER — EXEMESTANE 25 MG/1
25 TABLET, FILM COATED ORAL
Qty: 90 | Refills: 3 | Status: ACTIVE | COMMUNITY
Start: 2025-04-29 | End: 1900-01-01

## 2025-04-30 LAB
ALBUMIN SERPL ELPH-MCNC: 4.6 G/DL
ALP BLD-CCNC: 139 U/L
ALT SERPL-CCNC: 38 U/L
ANION GAP SERPL CALC-SCNC: 13 MMOL/L
AST SERPL-CCNC: 23 U/L
BILIRUB SERPL-MCNC: 0.3 MG/DL
BUN SERPL-MCNC: 12 MG/DL
CALCIUM SERPL-MCNC: 9.7 MG/DL
CHLORIDE SERPL-SCNC: 103 MMOL/L
CO2 SERPL-SCNC: 24 MMOL/L
CREAT SERPL-MCNC: 0.67 MG/DL
EGFRCR SERPLBLD CKD-EPI 2021: 104 ML/MIN/1.73M2
GLUCOSE SERPL-MCNC: 131 MG/DL
POTASSIUM SERPL-SCNC: 3.8 MMOL/L
PROT SERPL-MCNC: 6.7 G/DL
SODIUM SERPL-SCNC: 139 MMOL/L

## 2025-06-23 ENCOUNTER — OUTPATIENT (OUTPATIENT)
Dept: OUTPATIENT SERVICES | Facility: HOSPITAL | Age: 55
LOS: 1 days | Discharge: HOME | End: 2025-06-23

## 2025-06-23 DIAGNOSIS — Z90.13 ACQUIRED ABSENCE OF BILATERAL BREASTS AND NIPPLES: Chronic | ICD-10-CM

## 2025-06-23 DIAGNOSIS — Z98.890 OTHER SPECIFIED POSTPROCEDURAL STATES: Chronic | ICD-10-CM

## 2025-06-23 DIAGNOSIS — C50.919 MALIGNANT NEOPLASM OF UNSPECIFIED SITE OF UNSPECIFIED FEMALE BREAST: ICD-10-CM

## 2025-06-23 DIAGNOSIS — Z90.710 ACQUIRED ABSENCE OF BOTH CERVIX AND UTERUS: Chronic | ICD-10-CM

## 2025-06-23 DIAGNOSIS — Z98.82 BREAST IMPLANT STATUS: Chronic | ICD-10-CM

## 2025-06-23 DIAGNOSIS — Z90.49 ACQUIRED ABSENCE OF OTHER SPECIFIED PARTS OF DIGESTIVE TRACT: Chronic | ICD-10-CM

## 2025-06-24 ENCOUNTER — APPOINTMENT (OUTPATIENT)
Dept: HEMATOLOGY ONCOLOGY | Facility: CLINIC | Age: 55
End: 2025-06-24

## 2025-06-26 ENCOUNTER — APPOINTMENT (OUTPATIENT)
Dept: HEMATOLOGY ONCOLOGY | Facility: CLINIC | Age: 55
End: 2025-06-26
Payer: MEDICAID

## 2025-06-26 PROCEDURE — 99214 OFFICE O/P EST MOD 30 MIN: CPT | Mod: 95

## 2025-06-26 RX ORDER — OXYCODONE 5 MG/1
5 TABLET ORAL
Qty: 28 | Refills: 0 | Status: ACTIVE | COMMUNITY
Start: 2025-06-26 | End: 1900-01-01

## 2025-07-18 ENCOUNTER — APPOINTMENT (OUTPATIENT)
Dept: PULMONOLOGY | Facility: CLINIC | Age: 55
End: 2025-07-18

## 2025-08-28 ENCOUNTER — APPOINTMENT (OUTPATIENT)
Dept: PHYSICAL MEDICINE AND REHAB | Facility: CLINIC | Age: 55
End: 2025-08-28

## 2025-09-08 ENCOUNTER — APPOINTMENT (OUTPATIENT)
Dept: HEMATOLOGY ONCOLOGY | Facility: CLINIC | Age: 55
End: 2025-09-08

## 2025-09-08 ENCOUNTER — APPOINTMENT (OUTPATIENT)
Dept: HEMATOLOGY ONCOLOGY | Facility: CLINIC | Age: 55
End: 2025-09-08
Payer: MEDICAID

## 2025-09-08 ENCOUNTER — RESULT REVIEW (OUTPATIENT)
Age: 55
End: 2025-09-08

## 2025-09-08 VITALS
SYSTOLIC BLOOD PRESSURE: 113 MMHG | DIASTOLIC BLOOD PRESSURE: 82 MMHG | TEMPERATURE: 97.5 F | OXYGEN SATURATION: 100 % | HEIGHT: 62 IN | WEIGHT: 129.38 LBS | BODY MASS INDEX: 23.81 KG/M2 | HEART RATE: 83 BPM

## 2025-09-08 DIAGNOSIS — Z79.811 LONG TERM (CURRENT) USE OF AROMATASE INHIBITORS: ICD-10-CM

## 2025-09-08 DIAGNOSIS — C50.911 MALIGNANT NEOPLASM OF UNSPECIFIED SITE OF RIGHT FEMALE BREAST: ICD-10-CM

## 2025-09-08 PROCEDURE — G2211 COMPLEX E/M VISIT ADD ON: CPT | Mod: NC

## 2025-09-08 PROCEDURE — 99215 OFFICE O/P EST HI 40 MIN: CPT

## 2025-09-09 DIAGNOSIS — C50.919 MALIGNANT NEOPLASM OF UNSPECIFIED SITE OF UNSPECIFIED FEMALE BREAST: ICD-10-CM

## 2025-09-09 PROBLEM — Z79.811 AROMATASE INHIBITOR USE: Status: ACTIVE | Noted: 2025-09-09

## 2025-09-09 LAB
ALBUMIN SERPL ELPH-MCNC: 4.5 G/DL
ALP BLD-CCNC: 131 U/L
ALT SERPL-CCNC: 35 U/L
ANION GAP SERPL CALC-SCNC: 12 MMOL/L
AST SERPL-CCNC: 26 U/L
BILIRUB SERPL-MCNC: 0.2 MG/DL
BUN SERPL-MCNC: 12 MG/DL
CALCIUM SERPL-MCNC: 9.6 MG/DL
CHLORIDE SERPL-SCNC: 105 MMOL/L
CO2 SERPL-SCNC: 22 MMOL/L
CREAT SERPL-MCNC: 0.73 MG/DL
EGFRCR SERPLBLD CKD-EPI 2021: 98 ML/MIN/1.73M2
GLUCOSE SERPL-MCNC: 103 MG/DL
POTASSIUM SERPL-SCNC: 4.1 MMOL/L
PROT SERPL-MCNC: 6.7 G/DL
SODIUM SERPL-SCNC: 140 MMOL/L

## 2025-09-09 RX ORDER — RIBOCICLIB 200 MG/1
200 TABLET, FILM COATED ORAL
Qty: 42 | Refills: 5 | Status: DISCONTINUED | COMMUNITY
Start: 2025-09-08 | End: 2025-09-09

## 2025-09-09 RX ORDER — OLAPARIB 150 MG/1
150 TABLET, FILM COATED ORAL
Qty: 120 | Refills: 5 | Status: ACTIVE | COMMUNITY
Start: 2025-09-09 | End: 1900-01-01

## 2025-09-17 ENCOUNTER — NON-APPOINTMENT (OUTPATIENT)
Age: 55
End: 2025-09-17

## (undated) DEVICE — VALVE ENDOSCOPE DEFENDO SINGLE USE